# Patient Record
Sex: FEMALE | Race: WHITE | NOT HISPANIC OR LATINO | Employment: OTHER | URBAN - METROPOLITAN AREA
[De-identification: names, ages, dates, MRNs, and addresses within clinical notes are randomized per-mention and may not be internally consistent; named-entity substitution may affect disease eponyms.]

---

## 2017-01-01 ENCOUNTER — ANESTHESIA (OUTPATIENT)
Dept: PERIOP | Facility: HOSPITAL | Age: 57
End: 2017-01-01

## 2017-01-01 ENCOUNTER — OFFICE VISIT (OUTPATIENT)
Dept: ONCOLOGY | Facility: CLINIC | Age: 57
End: 2017-01-01

## 2017-01-01 ENCOUNTER — APPOINTMENT (OUTPATIENT)
Dept: ONCOLOGY | Facility: CLINIC | Age: 57
End: 2017-01-01

## 2017-01-01 ENCOUNTER — TELEPHONE (OUTPATIENT)
Dept: ONCOLOGY | Facility: CLINIC | Age: 57
End: 2017-01-01

## 2017-01-01 ENCOUNTER — DOCUMENTATION (OUTPATIENT)
Dept: RADIATION ONCOLOGY | Facility: HOSPITAL | Age: 57
End: 2017-01-01

## 2017-01-01 ENCOUNTER — TELEPHONE (OUTPATIENT)
Dept: OTHER | Facility: OTHER | Age: 57
End: 2017-01-01

## 2017-01-01 ENCOUNTER — TELEPHONE (OUTPATIENT)
Dept: ONCOLOGY | Facility: HOSPITAL | Age: 57
End: 2017-01-01

## 2017-01-01 ENCOUNTER — APPOINTMENT (OUTPATIENT)
Dept: LAB | Facility: HOSPITAL | Age: 57
End: 2017-01-01

## 2017-01-01 ENCOUNTER — DOCUMENTATION (OUTPATIENT)
Dept: ONCOLOGY | Facility: CLINIC | Age: 57
End: 2017-01-01

## 2017-01-01 ENCOUNTER — LAB (OUTPATIENT)
Dept: ONCOLOGY | Facility: HOSPITAL | Age: 57
End: 2017-01-01

## 2017-01-01 ENCOUNTER — INFUSION (OUTPATIENT)
Dept: ONCOLOGY | Facility: HOSPITAL | Age: 57
End: 2017-01-01

## 2017-01-01 ENCOUNTER — HOSPITAL ENCOUNTER (OUTPATIENT)
Dept: CARDIOLOGY | Facility: HOSPITAL | Age: 57
Discharge: HOME | End: 2017-02-28
Attending: INTERNAL MEDICINE | Admitting: INTERNAL MEDICINE

## 2017-01-01 ENCOUNTER — HOSPITAL ENCOUNTER (OUTPATIENT)
Dept: MRI IMAGING | Facility: HOSPITAL | Age: 57
Discharge: HOME | End: 2017-01-08
Attending: RADIOLOGY | Admitting: RADIOLOGY

## 2017-01-01 ENCOUNTER — TRANSCRIBE ORDERS (OUTPATIENT)
Dept: ONCOLOGY | Facility: CLINIC | Age: 57
End: 2017-01-01

## 2017-01-01 ENCOUNTER — APPOINTMENT (OUTPATIENT)
Dept: ULTRASOUND IMAGING | Facility: HOSPITAL | Age: 57
End: 2017-01-01

## 2017-01-01 ENCOUNTER — RADIATION ONCOLOGY WEEKLY ASSESSMENT (OUTPATIENT)
Dept: RADIATION ONCOLOGY | Facility: HOSPITAL | Age: 57
End: 2017-01-01

## 2017-01-01 ENCOUNTER — APPOINTMENT (OUTPATIENT)
Dept: GENERAL RADIOLOGY | Facility: HOSPITAL | Age: 57
End: 2017-01-01
Attending: INTERNAL MEDICINE

## 2017-01-01 ENCOUNTER — HOSPITAL ENCOUNTER (OUTPATIENT)
Dept: NUCLEAR MEDICINE | Facility: HOSPITAL | Age: 57
Discharge: HOME | End: 2017-04-14
Attending: INTERNAL MEDICINE

## 2017-01-01 ENCOUNTER — APPOINTMENT (OUTPATIENT)
Dept: GENERAL RADIOLOGY | Facility: HOSPITAL | Age: 57
End: 2017-01-01

## 2017-01-01 ENCOUNTER — APPOINTMENT (OUTPATIENT)
Dept: ONCOLOGY | Facility: HOSPITAL | Age: 57
End: 2017-01-01

## 2017-01-01 ENCOUNTER — APPOINTMENT (OUTPATIENT)
Dept: CARDIOLOGY | Facility: HOSPITAL | Age: 57
End: 2017-01-01
Attending: INTERNAL MEDICINE

## 2017-01-01 ENCOUNTER — ANESTHESIA EVENT (OUTPATIENT)
Dept: PERIOP | Facility: HOSPITAL | Age: 57
End: 2017-01-01

## 2017-01-01 ENCOUNTER — HOSPITAL ENCOUNTER (INPATIENT)
Facility: HOSPITAL | Age: 57
LOS: 3 days | Discharge: HOME | End: 2017-08-04
Attending: INTERNAL MEDICINE | Admitting: INTERNAL MEDICINE

## 2017-01-01 ENCOUNTER — APPOINTMENT (OUTPATIENT)
Dept: CT IMAGING | Facility: HOSPITAL | Age: 57
End: 2017-01-01

## 2017-01-01 ENCOUNTER — HOSPITAL ENCOUNTER (OUTPATIENT)
Dept: PET IMAGING | Facility: HOSPITAL | Age: 57
Discharge: HOME | End: 2017-07-05
Attending: INTERNAL MEDICINE | Admitting: INTERNAL MEDICINE

## 2017-01-01 ENCOUNTER — APPOINTMENT (OUTPATIENT)
Dept: ULTRASOUND IMAGING | Facility: HOSPITAL | Age: 57
End: 2017-01-01
Attending: INTERNAL MEDICINE

## 2017-01-01 ENCOUNTER — HOSPITAL ENCOUNTER (OUTPATIENT)
Dept: CARDIOLOGY | Facility: HOSPITAL | Age: 57
Discharge: HOME | End: 2017-06-02
Attending: INTERNAL MEDICINE

## 2017-01-01 ENCOUNTER — PROCEDURE VISIT (OUTPATIENT)
Dept: ONCOLOGY | Facility: HOSPITAL | Age: 57
End: 2017-01-01

## 2017-01-01 ENCOUNTER — OFFICE VISIT (OUTPATIENT)
Dept: RADIATION ONCOLOGY | Facility: HOSPITAL | Age: 57
End: 2017-01-01

## 2017-01-01 ENCOUNTER — HOSPITAL ENCOUNTER (INPATIENT)
Facility: HOSPITAL | Age: 57
LOS: 6 days | Discharge: HOME | End: 2017-06-08
Attending: INTERNAL MEDICINE | Admitting: INTERNAL MEDICINE

## 2017-01-01 ENCOUNTER — APPOINTMENT (OUTPATIENT)
Dept: GENERAL RADIOLOGY | Facility: HOSPITAL | Age: 57
End: 2017-01-01
Attending: NURSE PRACTITIONER

## 2017-01-01 ENCOUNTER — LAB (OUTPATIENT)
Dept: LAB | Facility: HOSPITAL | Age: 57
End: 2017-01-01

## 2017-01-01 ENCOUNTER — APPOINTMENT (OUTPATIENT)
Dept: RADIATION ONCOLOGY | Facility: HOSPITAL | Age: 57
End: 2017-01-01

## 2017-01-01 ENCOUNTER — CLINICAL SUPPORT (OUTPATIENT)
Dept: CARDIOLOGY | Facility: CLINIC | Age: 57
End: 2017-01-01

## 2017-01-01 ENCOUNTER — TELEPHONE (OUTPATIENT)
Dept: SURGERY | Facility: CLINIC | Age: 57
End: 2017-01-01

## 2017-01-01 ENCOUNTER — HOSPITAL ENCOUNTER (OUTPATIENT)
Dept: ULTRASOUND IMAGING | Facility: HOSPITAL | Age: 57
Discharge: HOME | End: 2017-03-07
Attending: INTERNAL MEDICINE | Admitting: INTERNAL MEDICINE

## 2017-01-01 ENCOUNTER — CLINICAL SUPPORT (OUTPATIENT)
Dept: ONCOLOGY | Facility: HOSPITAL | Age: 57
End: 2017-01-01

## 2017-01-01 ENCOUNTER — APPOINTMENT (OUTPATIENT)
Dept: MRI IMAGING | Facility: HOSPITAL | Age: 57
End: 2017-01-01

## 2017-01-01 ENCOUNTER — HOSPITAL ENCOUNTER (OUTPATIENT)
Dept: CT IMAGING | Facility: HOSPITAL | Age: 57
Discharge: HOME | End: 2017-03-23
Attending: INTERNAL MEDICINE | Admitting: INTERNAL MEDICINE

## 2017-01-01 ENCOUNTER — HOSPITAL ENCOUNTER (OUTPATIENT)
Dept: ULTRASOUND IMAGING | Facility: HOSPITAL | Age: 57
Discharge: HOME | End: 2017-03-24
Attending: INTERNAL MEDICINE

## 2017-01-01 ENCOUNTER — APPOINTMENT (OUTPATIENT)
Dept: CT IMAGING | Facility: HOSPITAL | Age: 57
End: 2017-01-01
Attending: INTERNAL MEDICINE

## 2017-01-01 ENCOUNTER — HOSPITAL ENCOUNTER (OUTPATIENT)
Dept: CT IMAGING | Facility: HOSPITAL | Age: 57
Discharge: HOME | End: 2017-03-24
Attending: INTERNAL MEDICINE | Admitting: INTERNAL MEDICINE

## 2017-01-01 ENCOUNTER — HOSPITAL ENCOUNTER (INPATIENT)
Facility: HOSPITAL | Age: 57
LOS: 7 days | Discharge: HOME HEALTH | End: 2017-05-03
Attending: INTERNAL MEDICINE | Admitting: INTERNAL MEDICINE

## 2017-01-01 ENCOUNTER — APPOINTMENT (OUTPATIENT)
Dept: CARDIOLOGY | Facility: HOSPITAL | Age: 57
End: 2017-01-01

## 2017-01-01 ENCOUNTER — OFFICE VISIT (OUTPATIENT)
Dept: SURGERY | Facility: CLINIC | Age: 57
End: 2017-01-01

## 2017-01-01 ENCOUNTER — RESEARCH ENCOUNTER (OUTPATIENT)
Dept: OTHER | Facility: OTHER | Age: 57
End: 2017-01-01

## 2017-01-01 VITALS
HEART RATE: 128 BPM | TEMPERATURE: 97.9 F | OXYGEN SATURATION: 97 % | WEIGHT: 121.6 LBS | HEIGHT: 67 IN | BODY MASS INDEX: 19.09 KG/M2 | SYSTOLIC BLOOD PRESSURE: 110 MMHG | RESPIRATION RATE: 18 BRPM | DIASTOLIC BLOOD PRESSURE: 70 MMHG

## 2017-01-01 VITALS
HEIGHT: 67 IN | HEART RATE: 114 BPM | TEMPERATURE: 98.3 F | RESPIRATION RATE: 16 BRPM | SYSTOLIC BLOOD PRESSURE: 100 MMHG | DIASTOLIC BLOOD PRESSURE: 64 MMHG | WEIGHT: 118.6 LBS | OXYGEN SATURATION: 97 % | BODY MASS INDEX: 18.62 KG/M2

## 2017-01-01 VITALS
SYSTOLIC BLOOD PRESSURE: 97 MMHG | TEMPERATURE: 98.3 F | RESPIRATION RATE: 18 BRPM | HEART RATE: 101 BPM | DIASTOLIC BLOOD PRESSURE: 68 MMHG | OXYGEN SATURATION: 97 %

## 2017-01-01 VITALS
SYSTOLIC BLOOD PRESSURE: 114 MMHG | TEMPERATURE: 98.1 F | HEART RATE: 86 BPM | WEIGHT: 134.8 LBS | RESPIRATION RATE: 16 BRPM | HEIGHT: 67 IN | DIASTOLIC BLOOD PRESSURE: 68 MMHG | OXYGEN SATURATION: 100 % | BODY MASS INDEX: 21.16 KG/M2

## 2017-01-01 VITALS
BODY MASS INDEX: 20.06 KG/M2 | HEIGHT: 67 IN | OXYGEN SATURATION: 96 % | HEART RATE: 120 BPM | RESPIRATION RATE: 18 BRPM | SYSTOLIC BLOOD PRESSURE: 92 MMHG | TEMPERATURE: 98.4 F | DIASTOLIC BLOOD PRESSURE: 64 MMHG | WEIGHT: 127.8 LBS

## 2017-01-01 VITALS
OXYGEN SATURATION: 100 % | RESPIRATION RATE: 16 BRPM | DIASTOLIC BLOOD PRESSURE: 72 MMHG | HEART RATE: 101 BPM | SYSTOLIC BLOOD PRESSURE: 108 MMHG

## 2017-01-01 VITALS
SYSTOLIC BLOOD PRESSURE: 108 MMHG | BODY MASS INDEX: 21.82 KG/M2 | HEART RATE: 97 BPM | DIASTOLIC BLOOD PRESSURE: 70 MMHG | HEIGHT: 67 IN | WEIGHT: 139 LBS | OXYGEN SATURATION: 96 %

## 2017-01-01 VITALS
RESPIRATION RATE: 18 BRPM | DIASTOLIC BLOOD PRESSURE: 62 MMHG | HEART RATE: 107 BPM | OXYGEN SATURATION: 99 % | SYSTOLIC BLOOD PRESSURE: 96 MMHG | WEIGHT: 132.2 LBS | HEIGHT: 67 IN | BODY MASS INDEX: 20.75 KG/M2 | TEMPERATURE: 97.4 F

## 2017-01-01 VITALS
BODY MASS INDEX: 21.77 KG/M2 | HEART RATE: 112 BPM | DIASTOLIC BLOOD PRESSURE: 95 MMHG | TEMPERATURE: 98.6 F | WEIGHT: 139 LBS | SYSTOLIC BLOOD PRESSURE: 141 MMHG

## 2017-01-01 VITALS
BODY MASS INDEX: 19.46 KG/M2 | SYSTOLIC BLOOD PRESSURE: 107 MMHG | HEIGHT: 67 IN | HEART RATE: 102 BPM | OXYGEN SATURATION: 99 % | TEMPERATURE: 98.4 F | RESPIRATION RATE: 16 BRPM | DIASTOLIC BLOOD PRESSURE: 68 MMHG | WEIGHT: 124 LBS

## 2017-01-01 VITALS
BODY MASS INDEX: 20.41 KG/M2 | WEIGHT: 127 LBS | HEIGHT: 66 IN | DIASTOLIC BLOOD PRESSURE: 64 MMHG | SYSTOLIC BLOOD PRESSURE: 92 MMHG

## 2017-01-01 VITALS
BODY MASS INDEX: 20.44 KG/M2 | SYSTOLIC BLOOD PRESSURE: 110 MMHG | DIASTOLIC BLOOD PRESSURE: 64 MMHG | OXYGEN SATURATION: 98 % | HEIGHT: 67 IN | HEART RATE: 119 BPM | TEMPERATURE: 97.8 F | RESPIRATION RATE: 12 BRPM | WEIGHT: 130.2 LBS

## 2017-01-01 VITALS
BODY MASS INDEX: 18.55 KG/M2 | RESPIRATION RATE: 16 BRPM | HEART RATE: 110 BPM | DIASTOLIC BLOOD PRESSURE: 62 MMHG | SYSTOLIC BLOOD PRESSURE: 108 MMHG | HEIGHT: 67 IN | WEIGHT: 118.2 LBS | OXYGEN SATURATION: 98 %

## 2017-01-01 VITALS
SYSTOLIC BLOOD PRESSURE: 124 MMHG | WEIGHT: 139.8 LBS | DIASTOLIC BLOOD PRESSURE: 86 MMHG | BODY MASS INDEX: 21.9 KG/M2 | TEMPERATURE: 97.9 F | HEART RATE: 106 BPM

## 2017-01-01 VITALS
BODY MASS INDEX: 21.82 KG/M2 | OXYGEN SATURATION: 100 % | DIASTOLIC BLOOD PRESSURE: 80 MMHG | WEIGHT: 139 LBS | HEIGHT: 67 IN | RESPIRATION RATE: 16 BRPM | SYSTOLIC BLOOD PRESSURE: 126 MMHG | TEMPERATURE: 97 F | HEART RATE: 74 BPM

## 2017-01-01 VITALS
DIASTOLIC BLOOD PRESSURE: 67 MMHG | HEART RATE: 112 BPM | RESPIRATION RATE: 16 BRPM | OXYGEN SATURATION: 97 % | WEIGHT: 132 LBS | HEIGHT: 67 IN | BODY MASS INDEX: 20.72 KG/M2 | TEMPERATURE: 97.8 F | SYSTOLIC BLOOD PRESSURE: 104 MMHG

## 2017-01-01 VITALS
HEIGHT: 67 IN | WEIGHT: 134.2 LBS | RESPIRATION RATE: 16 BRPM | HEART RATE: 96 BPM | TEMPERATURE: 98 F | BODY MASS INDEX: 21.06 KG/M2 | SYSTOLIC BLOOD PRESSURE: 120 MMHG | DIASTOLIC BLOOD PRESSURE: 70 MMHG

## 2017-01-01 VITALS
HEIGHT: 67 IN | DIASTOLIC BLOOD PRESSURE: 62 MMHG | TEMPERATURE: 97.9 F | SYSTOLIC BLOOD PRESSURE: 90 MMHG | WEIGHT: 123.6 LBS | BODY MASS INDEX: 19.4 KG/M2 | RESPIRATION RATE: 16 BRPM | HEART RATE: 102 BPM

## 2017-01-01 VITALS
WEIGHT: 119.2 LBS | BODY MASS INDEX: 18.71 KG/M2 | SYSTOLIC BLOOD PRESSURE: 110 MMHG | RESPIRATION RATE: 16 BRPM | TEMPERATURE: 98.5 F | OXYGEN SATURATION: 98 % | HEART RATE: 122 BPM | DIASTOLIC BLOOD PRESSURE: 68 MMHG | HEIGHT: 67 IN

## 2017-01-01 VITALS
HEART RATE: 116 BPM | DIASTOLIC BLOOD PRESSURE: 58 MMHG | OXYGEN SATURATION: 100 % | TEMPERATURE: 97.5 F | WEIGHT: 121 LBS | HEIGHT: 67 IN | BODY MASS INDEX: 18.99 KG/M2 | SYSTOLIC BLOOD PRESSURE: 94 MMHG | RESPIRATION RATE: 18 BRPM

## 2017-01-01 VITALS
DIASTOLIC BLOOD PRESSURE: 60 MMHG | DIASTOLIC BLOOD PRESSURE: 61 MMHG | OXYGEN SATURATION: 99 % | HEART RATE: 110 BPM | RESPIRATION RATE: 16 BRPM | SYSTOLIC BLOOD PRESSURE: 102 MMHG | TEMPERATURE: 99.4 F | TEMPERATURE: 98.3 F | HEART RATE: 121 BPM | WEIGHT: 121.6 LBS | BODY MASS INDEX: 19.49 KG/M2 | SYSTOLIC BLOOD PRESSURE: 103 MMHG | HEIGHT: 67 IN | WEIGHT: 124.2 LBS | BODY MASS INDEX: 19.2 KG/M2

## 2017-01-01 VITALS
BODY MASS INDEX: 18.82 KG/M2 | DIASTOLIC BLOOD PRESSURE: 63 MMHG | HEART RATE: 109 BPM | OXYGEN SATURATION: 99 % | SYSTOLIC BLOOD PRESSURE: 95 MMHG | WEIGHT: 119.2 LBS

## 2017-01-01 VITALS
SYSTOLIC BLOOD PRESSURE: 151 MMHG | RESPIRATION RATE: 16 BRPM | HEART RATE: 106 BPM | HEIGHT: 67 IN | TEMPERATURE: 98.5 F | OXYGEN SATURATION: 92 % | DIASTOLIC BLOOD PRESSURE: 98 MMHG

## 2017-01-01 VITALS
HEART RATE: 99 BPM | BODY MASS INDEX: 21.82 KG/M2 | OXYGEN SATURATION: 99 % | RESPIRATION RATE: 16 BRPM | SYSTOLIC BLOOD PRESSURE: 130 MMHG | TEMPERATURE: 98 F | DIASTOLIC BLOOD PRESSURE: 76 MMHG | HEIGHT: 67 IN | WEIGHT: 139 LBS

## 2017-01-01 VITALS
DIASTOLIC BLOOD PRESSURE: 55 MMHG | SYSTOLIC BLOOD PRESSURE: 90 MMHG | HEART RATE: 128 BPM | HEIGHT: 67 IN | TEMPERATURE: 96.9 F | OXYGEN SATURATION: 98 % | WEIGHT: 125.2 LBS | RESPIRATION RATE: 16 BRPM | BODY MASS INDEX: 19.65 KG/M2

## 2017-01-01 VITALS
BODY MASS INDEX: 20.72 KG/M2 | RESPIRATION RATE: 16 BRPM | TEMPERATURE: 98.6 F | DIASTOLIC BLOOD PRESSURE: 76 MMHG | HEIGHT: 67 IN | HEART RATE: 108 BPM | OXYGEN SATURATION: 99 % | SYSTOLIC BLOOD PRESSURE: 110 MMHG | WEIGHT: 132 LBS

## 2017-01-01 VITALS — DIASTOLIC BLOOD PRESSURE: 72 MMHG | HEART RATE: 100 BPM | SYSTOLIC BLOOD PRESSURE: 110 MMHG

## 2017-01-01 DIAGNOSIS — J90 BILATERAL PLEURAL EFFUSION: ICD-10-CM

## 2017-01-01 DIAGNOSIS — C50.919 BREAST CANCER METASTASIZED TO BRAIN, UNSPECIFIED LATERALITY (HCC): Primary | ICD-10-CM

## 2017-01-01 DIAGNOSIS — C79.31 BREAST CANCER METASTASIZED TO BRAIN, UNSPECIFIED LATERALITY (HCC): ICD-10-CM

## 2017-01-01 DIAGNOSIS — C50.919 BREAST CANCER METASTASIZED TO BRAIN, UNSPECIFIED LATERALITY (HCC): ICD-10-CM

## 2017-01-01 DIAGNOSIS — C78.02 MALIGNANT NEOPLASM METASTATIC TO LEFT LUNG (HCC): ICD-10-CM

## 2017-01-01 DIAGNOSIS — C78.7 BREAST CANCER METASTASIZED TO LIVER, UNSPECIFIED LATERALITY (HCC): ICD-10-CM

## 2017-01-01 DIAGNOSIS — C78.7 BREAST CANCER METASTASIZED TO LIVER, LEFT (HCC): Primary | ICD-10-CM

## 2017-01-01 DIAGNOSIS — C50.919 BREAST CANCER METASTASIZED TO LIVER, UNSPECIFIED LATERALITY (HCC): ICD-10-CM

## 2017-01-01 DIAGNOSIS — C50.919 MALIGNANT NEOPLASM OF OTHER SPECIFIED SITES OF FEMALE BREAST: Primary | ICD-10-CM

## 2017-01-01 DIAGNOSIS — Z45.2 FITTING AND ADJUSTMENT OF VASCULAR CATHETER: ICD-10-CM

## 2017-01-01 DIAGNOSIS — C50.919 MALIGNANT NEOPLASM OF OTHER SPECIFIED SITES OF FEMALE BREAST: ICD-10-CM

## 2017-01-01 DIAGNOSIS — Z79.899 ENCOUNTER FOR LONG-TERM (CURRENT) USE OF HIGH-RISK MEDICATION: ICD-10-CM

## 2017-01-01 DIAGNOSIS — D70.1 CHEMOTHERAPY INDUCED NEUTROPENIA (HCC): ICD-10-CM

## 2017-01-01 DIAGNOSIS — T45.1X5A ANEMIA ASSOCIATED WITH CHEMOTHERAPY: Primary | ICD-10-CM

## 2017-01-01 DIAGNOSIS — J91.0 MALIGNANT PLEURAL EFFUSION: Primary | ICD-10-CM

## 2017-01-01 DIAGNOSIS — G89.3 CANCER-RELATED PAIN: ICD-10-CM

## 2017-01-01 DIAGNOSIS — C50.919 MALIGNANT NEOPLASM OF FEMALE BREAST, UNSPECIFIED LATERALITY, UNSPECIFIED SITE OF BREAST: Primary | ICD-10-CM

## 2017-01-01 DIAGNOSIS — Z45.2 FITTING AND ADJUSTMENT OF VASCULAR CATHETER: Primary | ICD-10-CM

## 2017-01-01 DIAGNOSIS — Z79.899 ENCOUNTER FOR LONG-TERM (CURRENT) USE OF HIGH-RISK MEDICATION: Primary | ICD-10-CM

## 2017-01-01 DIAGNOSIS — C50.919 BREAST CANCER METASTASIZED TO LIVER, UNSPECIFIED LATERALITY (HCC): Primary | ICD-10-CM

## 2017-01-01 DIAGNOSIS — R52 INTRACTABLE PAIN: ICD-10-CM

## 2017-01-01 DIAGNOSIS — C50.912 BREAST CANCER METASTASIZED TO BRAIN, LEFT (HCC): ICD-10-CM

## 2017-01-01 DIAGNOSIS — C50.912 BREAST CANCER METASTASIZED TO LIVER, LEFT (HCC): Primary | ICD-10-CM

## 2017-01-01 DIAGNOSIS — C78.7 BREAST CANCER METASTASIZED TO LIVER, UNSPECIFIED LATERALITY (HCC): Primary | ICD-10-CM

## 2017-01-01 DIAGNOSIS — D64.81 ANEMIA ASSOCIATED WITH CHEMOTHERAPY: ICD-10-CM

## 2017-01-01 DIAGNOSIS — T45.1X5A CHEMOTHERAPY INDUCED NEUTROPENIA (HCC): ICD-10-CM

## 2017-01-01 DIAGNOSIS — C79.31 BREAST CANCER METASTASIZED TO BRAIN, LEFT (HCC): Primary | ICD-10-CM

## 2017-01-01 DIAGNOSIS — R06.00 DYSPNEA, UNSPECIFIED: ICD-10-CM

## 2017-01-01 DIAGNOSIS — I31.39 PERICARDIAL EFFUSION WITHOUT CARDIAC TAMPONADE: ICD-10-CM

## 2017-01-01 DIAGNOSIS — Z51.11 ENCOUNTER FOR ANTINEOPLASTIC CHEMOTHERAPY: Primary | ICD-10-CM

## 2017-01-01 DIAGNOSIS — T45.1X5A ANEMIA ASSOCIATED WITH CHEMOTHERAPY: ICD-10-CM

## 2017-01-01 DIAGNOSIS — C78.02 MALIGNANT NEOPLASM METASTATIC TO LEFT LUNG (HCC): Primary | ICD-10-CM

## 2017-01-01 DIAGNOSIS — E87.1 HYPONATREMIA SYNDROME: ICD-10-CM

## 2017-01-01 DIAGNOSIS — C79.31 BREAST CANCER METASTASIZED TO BRAIN, UNSPECIFIED LATERALITY (HCC): Primary | ICD-10-CM

## 2017-01-01 DIAGNOSIS — C79.31 BRAIN METASTASIS: ICD-10-CM

## 2017-01-01 DIAGNOSIS — J90 BILATERAL PLEURAL EFFUSION: Primary | ICD-10-CM

## 2017-01-01 DIAGNOSIS — C50.912 BREAST CANCER METASTASIZED TO BRAIN, LEFT (HCC): Primary | ICD-10-CM

## 2017-01-01 DIAGNOSIS — R07.81 PLEURITIC CHEST PAIN: ICD-10-CM

## 2017-01-01 DIAGNOSIS — J91.0 MALIGNANT PLEURAL EFFUSION: ICD-10-CM

## 2017-01-01 DIAGNOSIS — C50.912 BREAST CANCER METASTASIZED TO LIVER, LEFT (HCC): ICD-10-CM

## 2017-01-01 DIAGNOSIS — M25.862 KNEE JOINT CYST, LEFT: ICD-10-CM

## 2017-01-01 DIAGNOSIS — K59.00 CONSTIPATION, UNSPECIFIED CONSTIPATION TYPE: ICD-10-CM

## 2017-01-01 DIAGNOSIS — R53.1 WEAKNESS: Primary | ICD-10-CM

## 2017-01-01 DIAGNOSIS — M25.862 KNEE JOINT CYST, LEFT: Primary | ICD-10-CM

## 2017-01-01 DIAGNOSIS — D64.81 ANEMIA ASSOCIATED WITH CHEMOTHERAPY: Primary | ICD-10-CM

## 2017-01-01 DIAGNOSIS — K11.7 INCREASED OROPHARYNGEAL SECRETIONS: Primary | ICD-10-CM

## 2017-01-01 DIAGNOSIS — I31.39 PERICARDIAL EFFUSION WITHOUT CARDIAC TAMPONADE: Primary | ICD-10-CM

## 2017-01-01 DIAGNOSIS — C79.31 BRAIN METASTASIS: Primary | ICD-10-CM

## 2017-01-01 DIAGNOSIS — C79.31 BREAST CANCER METASTASIZED TO BRAIN, LEFT (HCC): ICD-10-CM

## 2017-01-01 DIAGNOSIS — C78.7 BREAST CANCER METASTASIZED TO LIVER, LEFT (HCC): ICD-10-CM

## 2017-01-01 DIAGNOSIS — D72.819 LEUKOPENIA, UNSPECIFIED TYPE: ICD-10-CM

## 2017-01-01 DIAGNOSIS — R53.1 WEAKNESS: ICD-10-CM

## 2017-01-01 LAB
ABO + RH BLD: NORMAL
ABO + RH BLD: NORMAL
ABO GROUP BLD: NORMAL
ALBUMIN SERPL-MCNC: 2.4 G/DL (ref 3.5–5.2)
ALBUMIN SERPL-MCNC: 2.5 G/DL (ref 3.5–5.2)
ALBUMIN SERPL-MCNC: 2.6 G/DL (ref 3.5–5.2)
ALBUMIN SERPL-MCNC: 2.7 G/DL (ref 3.5–5.2)
ALBUMIN SERPL-MCNC: 2.8 G/DL (ref 3.5–5.2)
ALBUMIN SERPL-MCNC: 2.9 G/DL (ref 3.5–5.2)
ALBUMIN SERPL-MCNC: 3.1 G/DL (ref 3.5–5.2)
ALBUMIN SERPL-MCNC: 3.3 G/DL (ref 3.5–5.2)
ALBUMIN SERPL-MCNC: 3.3 G/DL (ref 3.5–5.2)
ALBUMIN SERPL-MCNC: 3.4 G/DL (ref 3.5–5.2)
ALBUMIN SERPL-MCNC: 3.4 G/DL (ref 3.5–5.2)
ALBUMIN SERPL-MCNC: 3.6 G/DL (ref 3.5–5.2)
ALBUMIN SERPL-MCNC: 3.7 G/DL (ref 3.5–5.2)
ALBUMIN SERPL-MCNC: 4.1 G/DL (ref 3.5–5.2)
ALBUMIN SERPL-MCNC: 4.1 G/DL (ref 3.5–5.2)
ALBUMIN/GLOB SERPL: 0.9 G/DL
ALBUMIN/GLOB SERPL: 0.9 G/DL
ALBUMIN/GLOB SERPL: 0.9 G/DL (ref 1.1–2.4)
ALBUMIN/GLOB SERPL: 1 G/DL
ALBUMIN/GLOB SERPL: 1 G/DL (ref 1.1–2.4)
ALBUMIN/GLOB SERPL: 1.2 G/DL (ref 1.1–2.4)
ALBUMIN/GLOB SERPL: 1.3 G/DL (ref 1.1–2.4)
ALBUMIN/GLOB SERPL: 1.3 G/DL (ref 1.1–2.4)
ALBUMIN/GLOB SERPL: 1.5 G/DL (ref 1.1–2.4)
ALBUMIN/GLOB SERPL: 1.6 G/DL (ref 1.1–2.4)
ALP SERPL-CCNC: 127 U/L (ref 38–116)
ALP SERPL-CCNC: 135 U/L (ref 38–116)
ALP SERPL-CCNC: 137 U/L (ref 39–117)
ALP SERPL-CCNC: 146 U/L (ref 39–117)
ALP SERPL-CCNC: 152 U/L (ref 38–116)
ALP SERPL-CCNC: 152 U/L (ref 39–117)
ALP SERPL-CCNC: 152 U/L (ref 39–117)
ALP SERPL-CCNC: 154 U/L (ref 39–117)
ALP SERPL-CCNC: 157 U/L (ref 39–117)
ALP SERPL-CCNC: 163 U/L (ref 38–116)
ALP SERPL-CCNC: 165 U/L (ref 38–116)
ALP SERPL-CCNC: 166 U/L (ref 38–116)
ALP SERPL-CCNC: 171 U/L (ref 38–116)
ALP SERPL-CCNC: 174 U/L (ref 39–117)
ALP SERPL-CCNC: 182 U/L (ref 38–116)
ALP SERPL-CCNC: 184 U/L (ref 38–116)
ALP SERPL-CCNC: 186 U/L (ref 38–116)
ALP SERPL-CCNC: 202 U/L (ref 38–116)
ALP SERPL-CCNC: 210 U/L (ref 38–116)
ALP SERPL-CCNC: 224 U/L (ref 38–116)
ALT SERPL W P-5'-P-CCNC: 22 U/L (ref 0–33)
ALT SERPL W P-5'-P-CCNC: 27 U/L (ref 0–33)
ALT SERPL W P-5'-P-CCNC: 28 U/L (ref 1–33)
ALT SERPL W P-5'-P-CCNC: 29 U/L (ref 1–33)
ALT SERPL W P-5'-P-CCNC: 30 U/L (ref 0–33)
ALT SERPL W P-5'-P-CCNC: 30 U/L (ref 0–33)
ALT SERPL W P-5'-P-CCNC: 31 U/L (ref 1–33)
ALT SERPL W P-5'-P-CCNC: 33 U/L (ref 0–33)
ALT SERPL W P-5'-P-CCNC: 35 U/L (ref 0–33)
ALT SERPL W P-5'-P-CCNC: 36 U/L (ref 1–33)
ALT SERPL W P-5'-P-CCNC: 37 U/L (ref 1–33)
ALT SERPL W P-5'-P-CCNC: 38 U/L (ref 1–33)
ALT SERPL W P-5'-P-CCNC: 43 U/L (ref 0–33)
ALT SERPL W P-5'-P-CCNC: 44 U/L (ref 0–33)
ALT SERPL W P-5'-P-CCNC: 45 U/L (ref 0–33)
ALT SERPL W P-5'-P-CCNC: 46 U/L (ref 0–33)
ALT SERPL W P-5'-P-CCNC: 49 U/L (ref 1–33)
ALT SERPL W P-5'-P-CCNC: 54 U/L (ref 0–33)
ALT SERPL W P-5'-P-CCNC: 55 U/L (ref 0–33)
ALT SERPL W P-5'-P-CCNC: 58 U/L (ref 0–33)
ANION GAP SERPL CALCULATED.3IONS-SCNC: 10.4 MMOL/L
ANION GAP SERPL CALCULATED.3IONS-SCNC: 10.5 MMOL/L
ANION GAP SERPL CALCULATED.3IONS-SCNC: 10.7 MMOL/L
ANION GAP SERPL CALCULATED.3IONS-SCNC: 10.7 MMOL/L
ANION GAP SERPL CALCULATED.3IONS-SCNC: 10.8 MMOL/L
ANION GAP SERPL CALCULATED.3IONS-SCNC: 11 MMOL/L
ANION GAP SERPL CALCULATED.3IONS-SCNC: 11.1 MMOL/L
ANION GAP SERPL CALCULATED.3IONS-SCNC: 11.2 MMOL/L
ANION GAP SERPL CALCULATED.3IONS-SCNC: 11.3 MMOL/L
ANION GAP SERPL CALCULATED.3IONS-SCNC: 11.7 MMOL/L
ANION GAP SERPL CALCULATED.3IONS-SCNC: 12 MMOL/L
ANION GAP SERPL CALCULATED.3IONS-SCNC: 12.3 MMOL/L
ANION GAP SERPL CALCULATED.3IONS-SCNC: 12.8 MMOL/L
ANION GAP SERPL CALCULATED.3IONS-SCNC: 13.2 MMOL/L
ANION GAP SERPL CALCULATED.3IONS-SCNC: 13.2 MMOL/L
ANION GAP SERPL CALCULATED.3IONS-SCNC: 13.3 MMOL/L
ANION GAP SERPL CALCULATED.3IONS-SCNC: 13.6 MMOL/L
ANION GAP SERPL CALCULATED.3IONS-SCNC: 13.7 MMOL/L
ANION GAP SERPL CALCULATED.3IONS-SCNC: 13.9 MMOL/L
ANION GAP SERPL CALCULATED.3IONS-SCNC: 14.4 MMOL/L
ANION GAP SERPL CALCULATED.3IONS-SCNC: 15.5 MMOL/L
ANION GAP SERPL CALCULATED.3IONS-SCNC: 17.4 MMOL/L
ANION GAP SERPL CALCULATED.3IONS-SCNC: 19.5 MMOL/L
ANION GAP SERPL CALCULATED.3IONS-SCNC: 6.6 MMOL/L
ANION GAP SERPL CALCULATED.3IONS-SCNC: 7.1 MMOL/L
ANION GAP SERPL CALCULATED.3IONS-SCNC: 8.3 MMOL/L
ANION GAP SERPL CALCULATED.3IONS-SCNC: 8.7 MMOL/L
ANION GAP SERPL CALCULATED.3IONS-SCNC: 9.2 MMOL/L
ANION GAP SERPL CALCULATED.3IONS-SCNC: 9.5 MMOL/L
ANION GAP SERPL CALCULATED.3IONS-SCNC: 9.7 MMOL/L
ANISOCYTOSIS BLD QL: ABNORMAL
ANISOCYTOSIS BLD QL: ABNORMAL
APTT PPP: 31.7 SECONDS (ref 22.7–35.4)
AST SERPL-CCNC: 31 U/L (ref 1–32)
AST SERPL-CCNC: 33 U/L (ref 0–32)
AST SERPL-CCNC: 33 U/L (ref 1–32)
AST SERPL-CCNC: 34 U/L (ref 0–32)
AST SERPL-CCNC: 35 U/L (ref 0–32)
AST SERPL-CCNC: 36 U/L (ref 1–32)
AST SERPL-CCNC: 38 U/L (ref 0–32)
AST SERPL-CCNC: 38 U/L (ref 0–32)
AST SERPL-CCNC: 39 U/L (ref 0–32)
AST SERPL-CCNC: 39 U/L (ref 0–32)
AST SERPL-CCNC: 40 U/L (ref 0–32)
AST SERPL-CCNC: 41 U/L (ref 0–32)
AST SERPL-CCNC: 43 U/L (ref 0–32)
AST SERPL-CCNC: 45 U/L (ref 1–32)
AST SERPL-CCNC: 45 U/L (ref 1–32)
AST SERPL-CCNC: 49 U/L (ref 0–32)
AST SERPL-CCNC: 49 U/L (ref 0–32)
AST SERPL-CCNC: 58 U/L (ref 0–32)
AST SERPL-CCNC: 59 U/L (ref 1–32)
AST SERPL-CCNC: 61 U/L (ref 1–32)
BASOPHILS # BLD AUTO: 0 10*3/MM3 (ref 0–0.1)
BASOPHILS # BLD AUTO: 0 10*3/MM3 (ref 0–0.2)
BASOPHILS # BLD AUTO: 0.01 10*3/MM3 (ref 0–0.1)
BASOPHILS # BLD AUTO: 0.01 10*3/MM3 (ref 0–0.1)
BASOPHILS # BLD AUTO: 0.01 10*3/MM3 (ref 0–0.2)
BASOPHILS # BLD AUTO: 0.02 10*3/MM3 (ref 0–0.1)
BASOPHILS # BLD AUTO: 0.02 10*3/MM3 (ref 0–0.2)
BASOPHILS # BLD AUTO: 0.03 10*3/MM3 (ref 0–0.1)
BASOPHILS NFR BLD AUTO: 0 % (ref 0–1.1)
BASOPHILS NFR BLD AUTO: 0 % (ref 0–1.5)
BASOPHILS NFR BLD AUTO: 0.1 % (ref 0–1.1)
BASOPHILS NFR BLD AUTO: 0.1 % (ref 0–1.5)
BASOPHILS NFR BLD AUTO: 0.2 % (ref 0–1.1)
BASOPHILS NFR BLD AUTO: 0.2 % (ref 0–1.5)
BASOPHILS NFR BLD AUTO: 0.3 % (ref 0–1.1)
BASOPHILS NFR BLD AUTO: 0.3 % (ref 0–1.5)
BASOPHILS NFR BLD AUTO: 0.4 % (ref 0–1.1)
BASOPHILS NFR BLD AUTO: 0.6 % (ref 0–1.1)
BASOPHILS NFR BLD AUTO: 0.7 % (ref 0–1.1)
BASOPHILS NFR BLD AUTO: 0.7 % (ref 0–1.1)
BASOPHILS NFR BLD AUTO: 0.8 % (ref 0–1.1)
BH BB BLOOD EXPIRATION DATE: NORMAL
BH BB BLOOD EXPIRATION DATE: NORMAL
BH BB BLOOD TYPE BARCODE: 5100
BH BB BLOOD TYPE BARCODE: 5100
BH BB DISPENSE STATUS: NORMAL
BH BB DISPENSE STATUS: NORMAL
BH BB PRODUCT CODE: NORMAL
BH BB PRODUCT CODE: NORMAL
BH BB UNIT NUMBER: NORMAL
BH BB UNIT NUMBER: NORMAL
BH CV ECHO MEAS - ACS: 1.8 CM
BH CV ECHO MEAS - ACS: 1.8 CM
BH CV ECHO MEAS - AO MAX PG (FULL): 3.3 MMHG
BH CV ECHO MEAS - AO MAX PG (FULL): 4.7 MMHG
BH CV ECHO MEAS - AO MAX PG: 7 MMHG
BH CV ECHO MEAS - AO MAX PG: 9.9 MMHG
BH CV ECHO MEAS - AO MEAN PG (FULL): 2 MMHG
BH CV ECHO MEAS - AO MEAN PG (FULL): 2.6 MMHG
BH CV ECHO MEAS - AO MEAN PG: 4 MMHG
BH CV ECHO MEAS - AO MEAN PG: 5.3 MMHG
BH CV ECHO MEAS - AO ROOT AREA (BSA CORRECTED): 1.5
BH CV ECHO MEAS - AO ROOT AREA (BSA CORRECTED): 1.9
BH CV ECHO MEAS - AO ROOT AREA: 5.6 CM^2
BH CV ECHO MEAS - AO ROOT AREA: 7.5 CM^2
BH CV ECHO MEAS - AO ROOT DIAM: 2.7 CM
BH CV ECHO MEAS - AO ROOT DIAM: 3.1 CM
BH CV ECHO MEAS - AO V2 MAX: 132 CM/SEC
BH CV ECHO MEAS - AO V2 MAX: 157.5 CM/SEC
BH CV ECHO MEAS - AO V2 MEAN: 105.1 CM/SEC
BH CV ECHO MEAS - AO V2 MEAN: 97.5 CM/SEC
BH CV ECHO MEAS - AO V2 VTI: 21.4 CM
BH CV ECHO MEAS - AO V2 VTI: 27.7 CM
BH CV ECHO MEAS - AVA(I,A): 1.9 CM^2
BH CV ECHO MEAS - AVA(I,A): 2.8 CM^2
BH CV ECHO MEAS - AVA(I,D): 1.9 CM^2
BH CV ECHO MEAS - AVA(I,D): 2.8 CM^2
BH CV ECHO MEAS - AVA(V,A): 2.1 CM^2
BH CV ECHO MEAS - AVA(V,A): 2.5 CM^2
BH CV ECHO MEAS - AVA(V,D): 2.1 CM^2
BH CV ECHO MEAS - AVA(V,D): 2.5 CM^2
BH CV ECHO MEAS - BSA(HAYCOCK): 1.6 M^2
BH CV ECHO MEAS - BSA(HAYCOCK): 1.7 M^2
BH CV ECHO MEAS - BSA: 1.6 M^2
BH CV ECHO MEAS - BSA: 1.7 M^2
BH CV ECHO MEAS - BZI_BMI: 19.4 KILOGRAMS/M^2
BH CV ECHO MEAS - BZI_BMI: 20.5 KILOGRAMS/M^2
BH CV ECHO MEAS - BZI_BMI: 21.8 KILOGRAMS/M^2
BH CV ECHO MEAS - BZI_METRIC_HEIGHT: 167.6 CM
BH CV ECHO MEAS - BZI_METRIC_HEIGHT: 170.2 CM
BH CV ECHO MEAS - BZI_METRIC_WEIGHT: 56.2 KG
BH CV ECHO MEAS - BZI_METRIC_WEIGHT: 57.6 KG
BH CV ECHO MEAS - BZI_METRIC_WEIGHT: 63.1 KG
BH CV ECHO MEAS - CONTRAST EF (2CH): 53.3 ML/M^2
BH CV ECHO MEAS - CONTRAST EF (2CH): 64.9 ML/M^2
BH CV ECHO MEAS - CONTRAST EF 4CH: 62.4 ML/M^2
BH CV ECHO MEAS - CONTRAST EF 4CH: 69.6 ML/M^2
BH CV ECHO MEAS - EDV(CUBED): 71.4 ML
BH CV ECHO MEAS - EDV(CUBED): 85.2 ML
BH CV ECHO MEAS - EDV(MOD-SP2): 45 ML
BH CV ECHO MEAS - EDV(MOD-SP2): 74 ML
BH CV ECHO MEAS - EDV(MOD-SP4): 46 ML
BH CV ECHO MEAS - EDV(MOD-SP4): 85 ML
BH CV ECHO MEAS - EDV(TEICH): 76.3 ML
BH CV ECHO MEAS - EDV(TEICH): 87.7 ML
BH CV ECHO MEAS - EF(CUBED): 64 %
BH CV ECHO MEAS - EF(CUBED): 81.7 %
BH CV ECHO MEAS - EF(MOD-SP2): 53.3 %
BH CV ECHO MEAS - EF(MOD-SP2): 64.9 %
BH CV ECHO MEAS - EF(MOD-SP4): 64 %
BH CV ECHO MEAS - EF(MOD-SP4): 69.6 %
BH CV ECHO MEAS - EF(TEICH): 56 %
BH CV ECHO MEAS - EF(TEICH): 74.5 %
BH CV ECHO MEAS - ESV(CUBED): 15.6 ML
BH CV ECHO MEAS - ESV(CUBED): 25.7 ML
BH CV ECHO MEAS - ESV(MOD-SP2): 21 ML
BH CV ECHO MEAS - ESV(MOD-SP2): 26 ML
BH CV ECHO MEAS - ESV(MOD-SP4): 14 ML
BH CV ECHO MEAS - ESV(MOD-SP4): 32 ML
BH CV ECHO MEAS - ESV(TEICH): 22.3 ML
BH CV ECHO MEAS - ESV(TEICH): 33.6 ML
BH CV ECHO MEAS - FS: 28.9 %
BH CV ECHO MEAS - FS: 43.2 %
BH CV ECHO MEAS - IVS/LVPW: 0.89
BH CV ECHO MEAS - IVS/LVPW: 1
BH CV ECHO MEAS - IVSD: 0.8 CM
BH CV ECHO MEAS - IVSD: 0.8 CM
BH CV ECHO MEAS - LA DIMENSION: 3.4 CM
BH CV ECHO MEAS - LA/AO: 1.1
BH CV ECHO MEAS - LAT PEAK E' VEL: 8 CM/SEC
BH CV ECHO MEAS - LAT PEAK E' VEL: 8.5 CM/SEC
BH CV ECHO MEAS - LV DIASTOLIC VOL/BSA (35-75): 27.9 ML/M^2
BH CV ECHO MEAS - LV DIASTOLIC VOL/BSA (35-75): 49.1 ML/M^2
BH CV ECHO MEAS - LV MASS(C)D: 118.6 GRAMS
BH CV ECHO MEAS - LV MASS(C)D: 96.5 GRAMS
BH CV ECHO MEAS - LV MASS(C)DI: 55.7 GRAMS/M^2
BH CV ECHO MEAS - LV MASS(C)DI: 71.9 GRAMS/M^2
BH CV ECHO MEAS - LV MAX PG: 3.6 MMHG
BH CV ECHO MEAS - LV MAX PG: 5.2 MMHG
BH CV ECHO MEAS - LV MEAN PG: 2 MMHG
BH CV ECHO MEAS - LV MEAN PG: 2.7 MMHG
BH CV ECHO MEAS - LV SYSTOLIC VOL/BSA (12-30): 18.5 ML/M^2
BH CV ECHO MEAS - LV SYSTOLIC VOL/BSA (12-30): 8.5 ML/M^2
BH CV ECHO MEAS - LV V1 MAX: 114.5 CM/SEC
BH CV ECHO MEAS - LV V1 MAX: 95.3 CM/SEC
BH CV ECHO MEAS - LV V1 MEAN: 64.3 CM/SEC
BH CV ECHO MEAS - LV V1 MEAN: 75.9 CM/SEC
BH CV ECHO MEAS - LV V1 VTI: 17.2 CM
BH CV ECHO MEAS - LV V1 VTI: 18.7 CM
BH CV ECHO MEAS - LVIDD: 4.1 CM
BH CV ECHO MEAS - LVIDD: 4.4 CM
BH CV ECHO MEAS - LVIDS: 2.5 CM
BH CV ECHO MEAS - LVIDS: 2.9 CM
BH CV ECHO MEAS - LVLD AP2: 6 CM
BH CV ECHO MEAS - LVLD AP2: 6.5 CM
BH CV ECHO MEAS - LVLD AP4: 6.3 CM
BH CV ECHO MEAS - LVLD AP4: 6.6 CM
BH CV ECHO MEAS - LVLS AP2: 5.4 CM
BH CV ECHO MEAS - LVLS AP2: 5.6 CM
BH CV ECHO MEAS - LVLS AP4: 5.3 CM
BH CV ECHO MEAS - LVLS AP4: 5.4 CM
BH CV ECHO MEAS - LVOT AREA (M): 2.8 CM^2
BH CV ECHO MEAS - LVOT AREA (M): 3.5 CM^2
BH CV ECHO MEAS - LVOT AREA: 2.9 CM^2
BH CV ECHO MEAS - LVOT AREA: 3.5 CM^2
BH CV ECHO MEAS - LVOT DIAM: 1.9 CM
BH CV ECHO MEAS - LVOT DIAM: 2.1 CM
BH CV ECHO MEAS - LVPWD: 0.77 CM
BH CV ECHO MEAS - LVPWD: 0.9 CM
BH CV ECHO MEAS - MED PEAK E' VEL: 10.2 CM/SEC
BH CV ECHO MEAS - MED PEAK E' VEL: 8 CM/SEC
BH CV ECHO MEAS - MV A DUR: 0.11 SEC
BH CV ECHO MEAS - MV A DUR: 0.11 SEC
BH CV ECHO MEAS - MV A MAX VEL: 66.9 CM/SEC
BH CV ECHO MEAS - MV A MAX VEL: 71.8 CM/SEC
BH CV ECHO MEAS - MV DEC SLOPE: 416 CM/SEC^2
BH CV ECHO MEAS - MV DEC TIME: 0.08 SEC
BH CV ECHO MEAS - MV DEC TIME: 0.18 SEC
BH CV ECHO MEAS - MV E MAX VEL: 70.8 CM/SEC
BH CV ECHO MEAS - MV E MAX VEL: 95.6 CM/SEC
BH CV ECHO MEAS - MV E/A: 1.1
BH CV ECHO MEAS - MV E/A: 1.3
BH CV ECHO MEAS - MV MAX PG: 2.7 MMHG
BH CV ECHO MEAS - MV MEAN PG: 1.6 MMHG
BH CV ECHO MEAS - MV P1/2T MAX VEL: 72.3 CM/SEC
BH CV ECHO MEAS - MV P1/2T: 50.9 MSEC
BH CV ECHO MEAS - MV V2 MAX: 81.7 CM/SEC
BH CV ECHO MEAS - MV V2 MEAN: 58.8 CM/SEC
BH CV ECHO MEAS - MV V2 VTI: 13.2 CM
BH CV ECHO MEAS - MVA P1/2T LCG: 3 CM^2
BH CV ECHO MEAS - MVA(P1/2T): 4.3 CM^2
BH CV ECHO MEAS - MVA(VTI): 4.1 CM^2
BH CV ECHO MEAS - PA MAX PG (FULL): 0.9 MMHG
BH CV ECHO MEAS - PA MAX PG (FULL): 4.3 MMHG
BH CV ECHO MEAS - PA MAX PG: 3.4 MMHG
BH CV ECHO MEAS - PA MAX PG: 6 MMHG
BH CV ECHO MEAS - PA V2 MAX: 122.2 CM/SEC
BH CV ECHO MEAS - PA V2 MAX: 92.3 CM/SEC
BH CV ECHO MEAS - PI END-D VEL: 126.2 CM/SEC
BH CV ECHO MEAS - PULM A REVS DUR: 0.1 SEC
BH CV ECHO MEAS - PULM A REVS DUR: 0.11 SEC
BH CV ECHO MEAS - PULM A REVS VEL: 29.1 CM/SEC
BH CV ECHO MEAS - PULM A REVS VEL: 44.1 CM/SEC
BH CV ECHO MEAS - PULM DIAS VEL: 54.3 CM/SEC
BH CV ECHO MEAS - PULM DIAS VEL: 70.8 CM/SEC
BH CV ECHO MEAS - PULM S/D: 0.64
BH CV ECHO MEAS - PULM S/D: 1.3
BH CV ECHO MEAS - PULM SYS VEL: 45.6 CM/SEC
BH CV ECHO MEAS - PULM SYS VEL: 72.3 CM/SEC
BH CV ECHO MEAS - PVA(V,A): 2.1 CM^2
BH CV ECHO MEAS - PVA(V,A): 2.4 CM^2
BH CV ECHO MEAS - PVA(V,D): 2.1 CM^2
BH CV ECHO MEAS - PVA(V,D): 2.4 CM^2
BH CV ECHO MEAS - QP/QS: 0.63
BH CV ECHO MEAS - QP/QS: 0.93
BH CV ECHO MEAS - RAP SYSTOLE: 10 MMHG
BH CV ECHO MEAS - RAP SYSTOLE: 3 MMHG
BH CV ECHO MEAS - RV MAX PG: 1.7 MMHG
BH CV ECHO MEAS - RV MAX PG: 2.5 MMHG
BH CV ECHO MEAS - RV MEAN PG: 1 MMHG
BH CV ECHO MEAS - RV MEAN PG: 1 MMHG
BH CV ECHO MEAS - RV V1 MAX: 65 CM/SEC
BH CV ECHO MEAS - RV V1 MAX: 79.1 CM/SEC
BH CV ECHO MEAS - RV V1 MEAN: 46.6 CM/SEC
BH CV ECHO MEAS - RV V1 MEAN: 55.6 CM/SEC
BH CV ECHO MEAS - RV V1 VTI: 12.8 CM
BH CV ECHO MEAS - RV V1 VTI: 13.3 CM
BH CV ECHO MEAS - RVDD: 1.9 CM
BH CV ECHO MEAS - RVOT AREA: 2.8 CM^2
BH CV ECHO MEAS - RVOT AREA: 3.9 CM^2
BH CV ECHO MEAS - RVOT DIAM: 1.9 CM
BH CV ECHO MEAS - RVOT DIAM: 2.2 CM
BH CV ECHO MEAS - RVSP: 21 MMHG
BH CV ECHO MEAS - RVSP: 32.7 MMHG
BH CV ECHO MEAS - SI(AO): 90.1 ML/M^2
BH CV ECHO MEAS - SI(AO): 97.9 ML/M^2
BH CV ECHO MEAS - SI(CUBED): 26.4 ML/M^2
BH CV ECHO MEAS - SI(CUBED): 42.2 ML/M^2
BH CV ECHO MEAS - SI(LVOT): 31 ML/M^2
BH CV ECHO MEAS - SI(LVOT): 36.1 ML/M^2
BH CV ECHO MEAS - SI(MOD-SP2): 14.6 ML/M^2
BH CV ECHO MEAS - SI(MOD-SP2): 27.7 ML/M^2
BH CV ECHO MEAS - SI(MOD-SP4): 19.4 ML/M^2
BH CV ECHO MEAS - SI(MOD-SP4): 30.6 ML/M^2
BH CV ECHO MEAS - SI(TEICH): 24.7 ML/M^2
BH CV ECHO MEAS - SI(TEICH): 39.6 ML/M^2
BH CV ECHO MEAS - SUP REN AO DIAM: 1.5 CM
BH CV ECHO MEAS - SV(AO): 156.1 ML
BH CV ECHO MEAS - SV(AO): 161.5 ML
BH CV ECHO MEAS - SV(CUBED): 45.7 ML
BH CV ECHO MEAS - SV(CUBED): 69.6 ML
BH CV ECHO MEAS - SV(LVOT): 53.7 ML
BH CV ECHO MEAS - SV(LVOT): 59.6 ML
BH CV ECHO MEAS - SV(MOD-SP2): 24 ML
BH CV ECHO MEAS - SV(MOD-SP2): 48 ML
BH CV ECHO MEAS - SV(MOD-SP4): 32 ML
BH CV ECHO MEAS - SV(MOD-SP4): 53 ML
BH CV ECHO MEAS - SV(RVOT): 37.7 ML
BH CV ECHO MEAS - SV(RVOT): 50.2 ML
BH CV ECHO MEAS - SV(TEICH): 42.7 ML
BH CV ECHO MEAS - SV(TEICH): 65.4 ML
BH CV ECHO MEAS - TAPSE (>1.6): 1.7 CM2
BH CV ECHO MEAS - TAPSE (>1.6): 2.2 CM2
BH CV ECHO MEAS - TR MAX VEL: 213.1 CM/SEC
BH CV ECHO MEAS - TR MAX VEL: 238 CM/SEC
BH CV UPPER VENOUS LEFT AXILLARY AUGMENT: NORMAL
BH CV UPPER VENOUS LEFT AXILLARY COMPETENT: NORMAL
BH CV UPPER VENOUS LEFT AXILLARY COMPRESS: NORMAL
BH CV UPPER VENOUS LEFT AXILLARY PHASIC: NORMAL
BH CV UPPER VENOUS LEFT AXILLARY SPONT: NORMAL
BH CV UPPER VENOUS LEFT BASILIC FOREARM COMPRESS: NORMAL
BH CV UPPER VENOUS LEFT BASILIC UPPER COMPRESS: NORMAL
BH CV UPPER VENOUS LEFT BRACHIAL COMPRESS: NORMAL
BH CV UPPER VENOUS LEFT CEPHALIC FOREARM COMPRESS: NORMAL
BH CV UPPER VENOUS LEFT CEPHALIC UPPER COMPRESS: NORMAL
BH CV UPPER VENOUS LEFT INTERNAL JUGULAR AUGMENT: NORMAL
BH CV UPPER VENOUS LEFT INTERNAL JUGULAR COMPETENT: NORMAL
BH CV UPPER VENOUS LEFT INTERNAL JUGULAR COMPRESS: NORMAL
BH CV UPPER VENOUS LEFT INTERNAL JUGULAR PHASIC: NORMAL
BH CV UPPER VENOUS LEFT INTERNAL JUGULAR SPONT: NORMAL
BH CV UPPER VENOUS LEFT RADIAL COMPRESS: NORMAL
BH CV UPPER VENOUS LEFT SUBCLAVIAN AUGMENT: NORMAL
BH CV UPPER VENOUS LEFT SUBCLAVIAN COMPETENT: NORMAL
BH CV UPPER VENOUS LEFT SUBCLAVIAN COMPRESS: NORMAL
BH CV UPPER VENOUS LEFT SUBCLAVIAN PHASIC: NORMAL
BH CV UPPER VENOUS LEFT SUBCLAVIAN SPONT: NORMAL
BH CV UPPER VENOUS LEFT ULNAR COMPRESS: NORMAL
BH CV UPPER VENOUS RIGHT INTERNAL JUGULAR AUGMENT: NORMAL
BH CV UPPER VENOUS RIGHT INTERNAL JUGULAR COMPETENT: NORMAL
BH CV UPPER VENOUS RIGHT INTERNAL JUGULAR COMPRESS: NORMAL
BH CV UPPER VENOUS RIGHT INTERNAL JUGULAR PHASIC: NORMAL
BH CV UPPER VENOUS RIGHT INTERNAL JUGULAR SPONT: NORMAL
BH CV UPPER VENOUS RIGHT SUBCLAVIAN AUGMENT: NORMAL
BH CV UPPER VENOUS RIGHT SUBCLAVIAN COMPETENT: NORMAL
BH CV UPPER VENOUS RIGHT SUBCLAVIAN COMPRESS: NORMAL
BH CV UPPER VENOUS RIGHT SUBCLAVIAN PHASIC: NORMAL
BH CV UPPER VENOUS RIGHT SUBCLAVIAN SPONT: NORMAL
BH CV VAS BP RIGHT ARM: NORMAL MMHG
BH CV XLRA - RV BASE: 2.4 CM
BH CV XLRA - RV BASE: 2.4 CM
BH CV XLRA - RV LENGTH: 5.8 CM
BH CV XLRA - RV MID: 1.9 CM
BH CV XLRA - TDI S': 14.8 CM/SEC
BH CV XLRA - TDI S': 22 CM/SEC
BILIRUB CONJ SERPL-MCNC: 0.2 MG/DL (ref 0–0.3)
BILIRUB CONJ SERPL-MCNC: 0.3 MG/DL (ref 0–0.3)
BILIRUB INDIRECT SERPL-MCNC: 0.4 MG/DL
BILIRUB INDIRECT SERPL-MCNC: 0.6 MG/DL
BILIRUB SERPL-MCNC: 0.3 MG/DL (ref 0.1–1.2)
BILIRUB SERPL-MCNC: 0.4 MG/DL (ref 0.1–1.2)
BILIRUB SERPL-MCNC: 0.5 MG/DL (ref 0.1–1.2)
BILIRUB SERPL-MCNC: 0.5 MG/DL (ref 0.1–1.2)
BILIRUB SERPL-MCNC: 0.6 MG/DL (ref 0.1–1.2)
BILIRUB SERPL-MCNC: 0.7 MG/DL (ref 0.1–1.2)
BILIRUB SERPL-MCNC: 0.8 MG/DL (ref 0.1–1.2)
BILIRUB SERPL-MCNC: 0.9 MG/DL (ref 0.1–1.2)
BILIRUB SERPL-MCNC: 1.4 MG/DL (ref 0.1–1.2)
BILIRUB SERPL-MCNC: 1.6 MG/DL (ref 0.1–1.2)
BLD GP AB SCN SERPL QL: NEGATIVE
BUN BLD-MCNC: 10 MG/DL (ref 6–20)
BUN BLD-MCNC: 11 MG/DL (ref 6–20)
BUN BLD-MCNC: 13 MG/DL (ref 6–20)
BUN BLD-MCNC: 14 MG/DL (ref 6–20)
BUN BLD-MCNC: 15 MG/DL (ref 6–20)
BUN BLD-MCNC: 16 MG/DL (ref 6–20)
BUN BLD-MCNC: 17 MG/DL (ref 6–20)
BUN BLD-MCNC: 18 MG/DL (ref 6–20)
BUN BLD-MCNC: 19 MG/DL (ref 6–20)
BUN BLD-MCNC: 20 MG/DL (ref 6–20)
BUN BLD-MCNC: 21 MG/DL (ref 6–20)
BUN BLD-MCNC: 23 MG/DL (ref 6–20)
BUN BLD-MCNC: 26 MG/DL (ref 6–20)
BUN BLD-MCNC: 29 MG/DL (ref 6–20)
BUN BLD-MCNC: 5 MG/DL (ref 6–20)
BUN BLD-MCNC: 8 MG/DL (ref 6–20)
BUN BLD-MCNC: 9 MG/DL (ref 6–20)
BUN/CREAT SERPL: 12.5 (ref 7–25)
BUN/CREAT SERPL: 16.4 (ref 7.3–30)
BUN/CREAT SERPL: 16.7 (ref 7.3–30)
BUN/CREAT SERPL: 18.2 (ref 7–25)
BUN/CREAT SERPL: 18.8 (ref 7.3–30)
BUN/CREAT SERPL: 19.1 (ref 7–25)
BUN/CREAT SERPL: 21.7 (ref 7–25)
BUN/CREAT SERPL: 22.4 (ref 7.3–30)
BUN/CREAT SERPL: 23.2 (ref 7.3–30)
BUN/CREAT SERPL: 23.9 (ref 7–25)
BUN/CREAT SERPL: 24.1 (ref 7.3–30)
BUN/CREAT SERPL: 24.4 (ref 7.3–30)
BUN/CREAT SERPL: 25.4 (ref 7.3–30)
BUN/CREAT SERPL: 26.8 (ref 7–25)
BUN/CREAT SERPL: 27 (ref 7.3–30)
BUN/CREAT SERPL: 27 (ref 7–25)
BUN/CREAT SERPL: 27.8 (ref 7–25)
BUN/CREAT SERPL: 27.9 (ref 7.3–30)
BUN/CREAT SERPL: 28.6 (ref 7–25)
BUN/CREAT SERPL: 29.4 (ref 7.3–30)
BUN/CREAT SERPL: 30 (ref 7–25)
BUN/CREAT SERPL: 31.3 (ref 7.3–30)
BUN/CREAT SERPL: 32.7 (ref 7–25)
BUN/CREAT SERPL: 34 (ref 7–25)
BUN/CREAT SERPL: 34.5 (ref 7–25)
BUN/CREAT SERPL: 37 (ref 7–25)
BUN/CREAT SERPL: 37.5 (ref 7–25)
BUN/CREAT SERPL: 40.4 (ref 7.3–30)
BUN/CREAT SERPL: 44.8 (ref 7–25)
BUN/CREAT SERPL: 47.5 (ref 7–25)
CALCIUM SPEC-SCNC: 7.9 MG/DL (ref 8.6–10.5)
CALCIUM SPEC-SCNC: 8.1 MG/DL (ref 8.6–10.5)
CALCIUM SPEC-SCNC: 8.2 MG/DL (ref 8.5–10.2)
CALCIUM SPEC-SCNC: 8.2 MG/DL (ref 8.6–10.5)
CALCIUM SPEC-SCNC: 8.2 MG/DL (ref 8.6–10.5)
CALCIUM SPEC-SCNC: 8.4 MG/DL (ref 8.6–10.5)
CALCIUM SPEC-SCNC: 8.5 MG/DL (ref 8.5–10.2)
CALCIUM SPEC-SCNC: 8.5 MG/DL (ref 8.6–10.5)
CALCIUM SPEC-SCNC: 8.5 MG/DL (ref 8.6–10.5)
CALCIUM SPEC-SCNC: 8.6 MG/DL (ref 8.5–10.2)
CALCIUM SPEC-SCNC: 8.6 MG/DL (ref 8.5–10.2)
CALCIUM SPEC-SCNC: 8.6 MG/DL (ref 8.6–10.5)
CALCIUM SPEC-SCNC: 8.6 MG/DL (ref 8.6–10.5)
CALCIUM SPEC-SCNC: 8.7 MG/DL (ref 8.5–10.2)
CALCIUM SPEC-SCNC: 8.7 MG/DL (ref 8.6–10.5)
CALCIUM SPEC-SCNC: 8.8 MG/DL (ref 8.5–10.2)
CALCIUM SPEC-SCNC: 8.8 MG/DL (ref 8.6–10.5)
CALCIUM SPEC-SCNC: 8.8 MG/DL (ref 8.6–10.5)
CALCIUM SPEC-SCNC: 8.9 MG/DL (ref 8.5–10.2)
CALCIUM SPEC-SCNC: 8.9 MG/DL (ref 8.6–10.5)
CALCIUM SPEC-SCNC: 9.1 MG/DL (ref 8.5–10.2)
CALCIUM SPEC-SCNC: 9.1 MG/DL (ref 8.5–10.2)
CALCIUM SPEC-SCNC: 9.1 MG/DL (ref 8.6–10.5)
CALCIUM SPEC-SCNC: 9.2 MG/DL (ref 8.5–10.2)
CALCIUM SPEC-SCNC: 9.3 MG/DL (ref 8.5–10.2)
CALCIUM SPEC-SCNC: 9.4 MG/DL (ref 8.6–10.5)
CALCIUM SPEC-SCNC: 9.5 MG/DL (ref 8.5–10.2)
CALCIUM SPEC-SCNC: 9.6 MG/DL (ref 8.5–10.2)
CHLORIDE SERPL-SCNC: 100 MMOL/L (ref 98–107)
CHLORIDE SERPL-SCNC: 102 MMOL/L (ref 98–107)
CHLORIDE SERPL-SCNC: 81 MMOL/L (ref 98–107)
CHLORIDE SERPL-SCNC: 81 MMOL/L (ref 98–107)
CHLORIDE SERPL-SCNC: 88 MMOL/L (ref 98–107)
CHLORIDE SERPL-SCNC: 90 MMOL/L (ref 98–107)
CHLORIDE SERPL-SCNC: 92 MMOL/L (ref 98–107)
CHLORIDE SERPL-SCNC: 93 MMOL/L (ref 98–107)
CHLORIDE SERPL-SCNC: 94 MMOL/L (ref 98–107)
CHLORIDE SERPL-SCNC: 94 MMOL/L (ref 98–107)
CHLORIDE SERPL-SCNC: 95 MMOL/L (ref 98–107)
CHLORIDE SERPL-SCNC: 96 MMOL/L (ref 98–107)
CHLORIDE SERPL-SCNC: 97 MMOL/L (ref 98–107)
CHLORIDE SERPL-SCNC: 98 MMOL/L (ref 98–107)
CHLORIDE SERPL-SCNC: 98 MMOL/L (ref 98–107)
CHLORIDE SERPL-SCNC: 99 MMOL/L (ref 98–107)
CHLORIDE SERPL-SCNC: 99 MMOL/L (ref 98–107)
CHOLEST SERPL-MCNC: 143 MG/DL (ref 0–200)
CLUMPED PLATELETS: PRESENT
CO2 SERPL-SCNC: 25.3 MMOL/L (ref 22–29)
CO2 SERPL-SCNC: 25.4 MMOL/L (ref 22–29)
CO2 SERPL-SCNC: 26.1 MMOL/L (ref 22–29)
CO2 SERPL-SCNC: 26.5 MMOL/L (ref 22–29)
CO2 SERPL-SCNC: 26.5 MMOL/L (ref 22–29)
CO2 SERPL-SCNC: 26.6 MMOL/L (ref 22–29)
CO2 SERPL-SCNC: 26.8 MMOL/L (ref 22–29)
CO2 SERPL-SCNC: 27.5 MMOL/L (ref 22–29)
CO2 SERPL-SCNC: 27.7 MMOL/L (ref 22–29)
CO2 SERPL-SCNC: 27.8 MMOL/L (ref 22–29)
CO2 SERPL-SCNC: 28 MMOL/L (ref 22–29)
CO2 SERPL-SCNC: 28.2 MMOL/L (ref 22–29)
CO2 SERPL-SCNC: 28.2 MMOL/L (ref 22–29)
CO2 SERPL-SCNC: 28.3 MMOL/L (ref 22–29)
CO2 SERPL-SCNC: 28.6 MMOL/L (ref 22–29)
CO2 SERPL-SCNC: 28.7 MMOL/L (ref 22–29)
CO2 SERPL-SCNC: 28.8 MMOL/L (ref 22–29)
CO2 SERPL-SCNC: 29 MMOL/L (ref 22–29)
CO2 SERPL-SCNC: 29.3 MMOL/L (ref 22–29)
CO2 SERPL-SCNC: 29.6 MMOL/L (ref 22–29)
CO2 SERPL-SCNC: 29.7 MMOL/L (ref 22–29)
CO2 SERPL-SCNC: 29.7 MMOL/L (ref 22–29)
CO2 SERPL-SCNC: 29.9 MMOL/L (ref 22–29)
CO2 SERPL-SCNC: 30.5 MMOL/L (ref 22–29)
CO2 SERPL-SCNC: 31.4 MMOL/L (ref 22–29)
CO2 SERPL-SCNC: 31.8 MMOL/L (ref 22–29)
CO2 SERPL-SCNC: 32.9 MMOL/L (ref 22–29)
CREAT BLD-MCNC: 0.4 MG/DL (ref 0.57–1)
CREAT BLD-MCNC: 0.44 MG/DL (ref 0.57–1)
CREAT BLD-MCNC: 0.46 MG/DL (ref 0.57–1)
CREAT BLD-MCNC: 0.46 MG/DL (ref 0.57–1)
CREAT BLD-MCNC: 0.47 MG/DL (ref 0.57–1)
CREAT BLD-MCNC: 0.47 MG/DL (ref 0.57–1)
CREAT BLD-MCNC: 0.5 MG/DL (ref 0.57–1)
CREAT BLD-MCNC: 0.54 MG/DL (ref 0.57–1)
CREAT BLD-MCNC: 0.55 MG/DL (ref 0.57–1)
CREAT BLD-MCNC: 0.55 MG/DL (ref 0.57–1)
CREAT BLD-MCNC: 0.56 MG/DL (ref 0.57–1)
CREAT BLD-MCNC: 0.57 MG/DL (ref 0.6–1.1)
CREAT BLD-MCNC: 0.58 MG/DL (ref 0.57–1)
CREAT BLD-MCNC: 0.58 MG/DL (ref 0.6–1.1)
CREAT BLD-MCNC: 0.61 MG/DL (ref 0.57–1)
CREAT BLD-MCNC: 0.63 MG/DL (ref 0.6–1.1)
CREAT BLD-MCNC: 0.63 MG/DL (ref 0.6–1.1)
CREAT BLD-MCNC: 0.64 MG/DL (ref 0.6–1.1)
CREAT BLD-MCNC: 0.67 MG/DL (ref 0.57–1)
CREAT BLD-MCNC: 0.67 MG/DL (ref 0.6–1.1)
CREAT BLD-MCNC: 0.68 MG/DL (ref 0.6–1.1)
CREAT BLD-MCNC: 0.68 MG/DL (ref 0.6–1.1)
CREAT BLD-MCNC: 0.74 MG/DL (ref 0.57–1)
CREAT BLD-MCNC: 0.76 MG/DL (ref 0.6–1.1)
CREAT BLD-MCNC: 0.78 MG/DL (ref 0.6–1.1)
CREAT BLD-MCNC: 0.78 MG/DL (ref 0.6–1.1)
CREAT BLD-MCNC: 0.8 MG/DL (ref 0.6–1.1)
CREAT BLD-MCNC: 0.82 MG/DL (ref 0.6–1.1)
CREAT BLDA-MCNC: 0.7 MG/DL (ref 0.6–1.3)
CREAT BLDA-MCNC: 0.8 MG/DL (ref 0.6–1.3)
CROSSMATCH INTERPRETATION: NORMAL
CROSSMATCH INTERPRETATION: NORMAL
CYTO UR: NORMAL
CYTO UR: NORMAL
DEPRECATED RDW RBC AUTO: 43.6 FL (ref 37–49)
DEPRECATED RDW RBC AUTO: 45.5 FL (ref 37–49)
DEPRECATED RDW RBC AUTO: 46.2 FL (ref 37–49)
DEPRECATED RDW RBC AUTO: 51.7 FL (ref 37–49)
DEPRECATED RDW RBC AUTO: 54.3 FL (ref 37–49)
DEPRECATED RDW RBC AUTO: 54.3 FL (ref 37–54)
DEPRECATED RDW RBC AUTO: 54.4 FL (ref 37–54)
DEPRECATED RDW RBC AUTO: 54.5 FL (ref 37–49)
DEPRECATED RDW RBC AUTO: 54.5 FL (ref 37–54)
DEPRECATED RDW RBC AUTO: 54.6 FL (ref 37–49)
DEPRECATED RDW RBC AUTO: 55.1 FL (ref 37–49)
DEPRECATED RDW RBC AUTO: 55.1 FL (ref 37–54)
DEPRECATED RDW RBC AUTO: 55.3 FL (ref 37–49)
DEPRECATED RDW RBC AUTO: 55.4 FL (ref 37–54)
DEPRECATED RDW RBC AUTO: 55.6 FL (ref 37–54)
DEPRECATED RDW RBC AUTO: 55.6 FL (ref 37–54)
DEPRECATED RDW RBC AUTO: 55.7 FL (ref 37–49)
DEPRECATED RDW RBC AUTO: 55.9 FL (ref 37–54)
DEPRECATED RDW RBC AUTO: 56 FL (ref 37–54)
DEPRECATED RDW RBC AUTO: 56 FL (ref 37–54)
DEPRECATED RDW RBC AUTO: 56.3 FL (ref 37–49)
DEPRECATED RDW RBC AUTO: 56.4 FL (ref 37–54)
DEPRECATED RDW RBC AUTO: 56.5 FL (ref 37–49)
DEPRECATED RDW RBC AUTO: 57.1 FL (ref 37–54)
DEPRECATED RDW RBC AUTO: 57.1 FL (ref 37–54)
DEPRECATED RDW RBC AUTO: 57.7 FL (ref 37–49)
DEPRECATED RDW RBC AUTO: 58.5 FL (ref 37–54)
DEPRECATED RDW RBC AUTO: 58.9 FL (ref 37–49)
DEPRECATED RDW RBC AUTO: 60.5 FL (ref 37–49)
DEPRECATED RDW RBC AUTO: 60.7 FL (ref 37–49)
DEPRECATED RDW RBC AUTO: 62.5 FL (ref 37–54)
DEPRECATED RDW RBC AUTO: 63.6 FL (ref 37–54)
DEPRECATED RDW RBC AUTO: 63.6 FL (ref 37–54)
DEPRECATED RDW RBC AUTO: 64.3 FL (ref 37–49)
DEPRECATED RDW RBC AUTO: 64.4 FL (ref 37–49)
E/E' RATIO: 8.5
EOSINOPHIL # BLD AUTO: 0 10*3/MM3 (ref 0–0.36)
EOSINOPHIL # BLD AUTO: 0 10*3/MM3 (ref 0–0.36)
EOSINOPHIL # BLD AUTO: 0 10*3/MM3 (ref 0–0.7)
EOSINOPHIL # BLD AUTO: 0.01 10*3/MM3 (ref 0–0.36)
EOSINOPHIL # BLD AUTO: 0.01 10*3/MM3 (ref 0–0.7)
EOSINOPHIL # BLD AUTO: 0.02 10*3/MM3 (ref 0–0.36)
EOSINOPHIL # BLD AUTO: 0.02 10*3/MM3 (ref 0–0.7)
EOSINOPHIL # BLD AUTO: 0.03 10*3/MM3 (ref 0–0.36)
EOSINOPHIL # BLD AUTO: 0.03 10*3/MM3 (ref 0–0.7)
EOSINOPHIL # BLD AUTO: 0.04 10*3/MM3 (ref 0–0.7)
EOSINOPHIL # BLD AUTO: 0.05 10*3/MM3 (ref 0–0.36)
EOSINOPHIL # BLD AUTO: 0.05 10*3/MM3 (ref 0–0.7)
EOSINOPHIL # BLD AUTO: 0.05 10*3/MM3 (ref 0–0.7)
EOSINOPHIL # BLD AUTO: 0.06 10*3/MM3 (ref 0–0.7)
EOSINOPHIL # BLD AUTO: 0.06 10*3/MM3 (ref 0–0.7)
EOSINOPHIL # BLD AUTO: 0.07 10*3/MM3 (ref 0–0.36)
EOSINOPHIL # BLD AUTO: 0.08 10*3/MM3 (ref 0–0.36)
EOSINOPHIL # BLD AUTO: 0.09 10*3/MM3 (ref 0–0.7)
EOSINOPHIL # BLD AUTO: 0.11 10*3/MM3 (ref 0–0.36)
EOSINOPHIL # BLD AUTO: 0.15 10*3/MM3 (ref 0–0.36)
EOSINOPHIL # BLD AUTO: 0.23 10*3/MM3 (ref 0–0.36)
EOSINOPHIL # BLD MANUAL: 0.09 10*3/MM3 (ref 0–0.7)
EOSINOPHIL NFR BLD AUTO: 0 % (ref 0.3–6.2)
EOSINOPHIL NFR BLD AUTO: 0 % (ref 1–5)
EOSINOPHIL NFR BLD AUTO: 0 % (ref 1–5)
EOSINOPHIL NFR BLD AUTO: 0.1 % (ref 0.3–6.2)
EOSINOPHIL NFR BLD AUTO: 0.1 % (ref 0.3–6.2)
EOSINOPHIL NFR BLD AUTO: 0.1 % (ref 1–5)
EOSINOPHIL NFR BLD AUTO: 0.2 % (ref 0.3–6.2)
EOSINOPHIL NFR BLD AUTO: 0.2 % (ref 1–5)
EOSINOPHIL NFR BLD AUTO: 0.3 % (ref 1–5)
EOSINOPHIL NFR BLD AUTO: 0.4 % (ref 0.3–6.2)
EOSINOPHIL NFR BLD AUTO: 0.4 % (ref 1–5)
EOSINOPHIL NFR BLD AUTO: 0.4 % (ref 1–5)
EOSINOPHIL NFR BLD AUTO: 0.5 % (ref 0.3–6.2)
EOSINOPHIL NFR BLD AUTO: 0.5 % (ref 1–5)
EOSINOPHIL NFR BLD AUTO: 0.6 % (ref 0.3–6.2)
EOSINOPHIL NFR BLD AUTO: 0.6 % (ref 1–5)
EOSINOPHIL NFR BLD AUTO: 0.8 % (ref 0.3–6.2)
EOSINOPHIL NFR BLD AUTO: 0.8 % (ref 1–5)
EOSINOPHIL NFR BLD AUTO: 1 % (ref 1–5)
EOSINOPHIL NFR BLD AUTO: 1.5 % (ref 0.3–6.2)
EOSINOPHIL NFR BLD AUTO: 1.5 % (ref 1–5)
EOSINOPHIL NFR BLD AUTO: 1.6 % (ref 0.3–6.2)
EOSINOPHIL NFR BLD AUTO: 1.7 % (ref 1–5)
EOSINOPHIL NFR BLD AUTO: 2.9 % (ref 0.3–6.2)
EOSINOPHIL NFR BLD AUTO: 3.1 % (ref 0.3–6.2)
EOSINOPHIL NFR BLD AUTO: 3.4 % (ref 1–5)
EOSINOPHIL NFR BLD AUTO: 3.8 % (ref 1–5)
EOSINOPHIL NFR BLD AUTO: 6.4 % (ref 1–5)
EOSINOPHIL NFR BLD MANUAL: 1 % (ref 0.3–6.2)
ERYTHROCYTE [DISTWIDTH] IN BLOOD BY AUTOMATED COUNT: 13.3 % (ref 11.7–14.5)
ERYTHROCYTE [DISTWIDTH] IN BLOOD BY AUTOMATED COUNT: 14.2 % (ref 11.7–14.5)
ERYTHROCYTE [DISTWIDTH] IN BLOOD BY AUTOMATED COUNT: 15 % (ref 11.7–14.5)
ERYTHROCYTE [DISTWIDTH] IN BLOOD BY AUTOMATED COUNT: 15.5 % (ref 11.7–14.5)
ERYTHROCYTE [DISTWIDTH] IN BLOOD BY AUTOMATED COUNT: 15.5 % (ref 11.7–14.5)
ERYTHROCYTE [DISTWIDTH] IN BLOOD BY AUTOMATED COUNT: 15.6 % (ref 11.7–13)
ERYTHROCYTE [DISTWIDTH] IN BLOOD BY AUTOMATED COUNT: 15.7 % (ref 11.7–14.5)
ERYTHROCYTE [DISTWIDTH] IN BLOOD BY AUTOMATED COUNT: 15.7 % (ref 11.7–14.5)
ERYTHROCYTE [DISTWIDTH] IN BLOOD BY AUTOMATED COUNT: 15.8 % (ref 11.7–13)
ERYTHROCYTE [DISTWIDTH] IN BLOOD BY AUTOMATED COUNT: 15.9 % (ref 11.7–13)
ERYTHROCYTE [DISTWIDTH] IN BLOOD BY AUTOMATED COUNT: 15.9 % (ref 11.7–13)
ERYTHROCYTE [DISTWIDTH] IN BLOOD BY AUTOMATED COUNT: 15.9 % (ref 11.7–14.5)
ERYTHROCYTE [DISTWIDTH] IN BLOOD BY AUTOMATED COUNT: 16 % (ref 11.7–13)
ERYTHROCYTE [DISTWIDTH] IN BLOOD BY AUTOMATED COUNT: 16 % (ref 11.7–13)
ERYTHROCYTE [DISTWIDTH] IN BLOOD BY AUTOMATED COUNT: 16.3 % (ref 11.7–13)
ERYTHROCYTE [DISTWIDTH] IN BLOOD BY AUTOMATED COUNT: 16.5 % (ref 11.7–13)
ERYTHROCYTE [DISTWIDTH] IN BLOOD BY AUTOMATED COUNT: 16.6 % (ref 11.7–14.5)
ERYTHROCYTE [DISTWIDTH] IN BLOOD BY AUTOMATED COUNT: 16.7 % (ref 11.7–13)
ERYTHROCYTE [DISTWIDTH] IN BLOOD BY AUTOMATED COUNT: 16.7 % (ref 11.7–14.5)
ERYTHROCYTE [DISTWIDTH] IN BLOOD BY AUTOMATED COUNT: 16.8 % (ref 11.7–13)
ERYTHROCYTE [DISTWIDTH] IN BLOOD BY AUTOMATED COUNT: 16.9 % (ref 11.7–13)
ERYTHROCYTE [DISTWIDTH] IN BLOOD BY AUTOMATED COUNT: 16.9 % (ref 11.7–14.5)
ERYTHROCYTE [DISTWIDTH] IN BLOOD BY AUTOMATED COUNT: 17.2 % (ref 11.7–13)
ERYTHROCYTE [DISTWIDTH] IN BLOOD BY AUTOMATED COUNT: 17.2 % (ref 11.7–14.5)
ERYTHROCYTE [DISTWIDTH] IN BLOOD BY AUTOMATED COUNT: 17.3 % (ref 11.7–13)
ERYTHROCYTE [DISTWIDTH] IN BLOOD BY AUTOMATED COUNT: 17.3 % (ref 11.7–13)
ERYTHROCYTE [DISTWIDTH] IN BLOOD BY AUTOMATED COUNT: 17.3 % (ref 11.7–14.5)
ERYTHROCYTE [DISTWIDTH] IN BLOOD BY AUTOMATED COUNT: 17.5 % (ref 11.7–14.5)
ERYTHROCYTE [DISTWIDTH] IN BLOOD BY AUTOMATED COUNT: 17.8 % (ref 11.7–14.5)
ERYTHROCYTE [DISTWIDTH] IN BLOOD BY AUTOMATED COUNT: 17.9 % (ref 11.7–14.5)
ERYTHROCYTE [DISTWIDTH] IN BLOOD BY AUTOMATED COUNT: 18.6 % (ref 11.7–14.5)
ERYTHROCYTE [DISTWIDTH] IN BLOOD BY AUTOMATED COUNT: 19 % (ref 11.7–14.5)
ERYTHROCYTE [DISTWIDTH] IN BLOOD BY AUTOMATED COUNT: 19.1 % (ref 11.7–13)
ERYTHROCYTE [DISTWIDTH] IN BLOOD BY AUTOMATED COUNT: 19.3 % (ref 11.7–13)
ERYTHROCYTE [DISTWIDTH] IN BLOOD BY AUTOMATED COUNT: 19.5 % (ref 11.7–13)
GFR SERPL CREATININE-BSD FRML MDRD: 101 ML/MIN/1.73
GFR SERPL CREATININE-BSD FRML MDRD: 107 ML/MIN/1.73
GFR SERPL CREATININE-BSD FRML MDRD: 107 ML/MIN/1.73
GFR SERPL CREATININE-BSD FRML MDRD: 109 ML/MIN/1.73
GFR SERPL CREATININE-BSD FRML MDRD: 112 ML/MIN/1.73
GFR SERPL CREATININE-BSD FRML MDRD: 114 ML/MIN/1.73
GFR SERPL CREATININE-BSD FRML MDRD: 114 ML/MIN/1.73
GFR SERPL CREATININE-BSD FRML MDRD: 116 ML/MIN/1.73
GFR SERPL CREATININE-BSD FRML MDRD: 127 ML/MIN/1.73
GFR SERPL CREATININE-BSD FRML MDRD: 137 ML/MIN/1.73
GFR SERPL CREATININE-BSD FRML MDRD: 137 ML/MIN/1.73
GFR SERPL CREATININE-BSD FRML MDRD: 140 ML/MIN/1.73
GFR SERPL CREATININE-BSD FRML MDRD: 140 ML/MIN/1.73
GFR SERPL CREATININE-BSD FRML MDRD: 147 ML/MIN/1.73
GFR SERPL CREATININE-BSD FRML MDRD: 72 ML/MIN/1.73
GFR SERPL CREATININE-BSD FRML MDRD: 74 ML/MIN/1.73
GFR SERPL CREATININE-BSD FRML MDRD: 76 ML/MIN/1.73
GFR SERPL CREATININE-BSD FRML MDRD: 76 ML/MIN/1.73
GFR SERPL CREATININE-BSD FRML MDRD: 78 ML/MIN/1.73
GFR SERPL CREATININE-BSD FRML MDRD: 81 ML/MIN/1.73
GFR SERPL CREATININE-BSD FRML MDRD: 89 ML/MIN/1.73
GFR SERPL CREATININE-BSD FRML MDRD: 89 ML/MIN/1.73
GFR SERPL CREATININE-BSD FRML MDRD: 91 ML/MIN/1.73
GFR SERPL CREATININE-BSD FRML MDRD: 91 ML/MIN/1.73
GFR SERPL CREATININE-BSD FRML MDRD: 96 ML/MIN/1.73
GFR SERPL CREATININE-BSD FRML MDRD: 97 ML/MIN/1.73
GFR SERPL CREATININE-BSD FRML MDRD: 97 ML/MIN/1.73
GFR SERPL CREATININE-BSD FRML MDRD: >150 ML/MIN/1.73
GLOBULIN UR ELPH-MCNC: 2.3 GM/DL (ref 1.8–3.5)
GLOBULIN UR ELPH-MCNC: 2.5 GM/DL (ref 1.8–3.5)
GLOBULIN UR ELPH-MCNC: 2.5 GM/DL (ref 1.8–3.5)
GLOBULIN UR ELPH-MCNC: 2.6 GM/DL
GLOBULIN UR ELPH-MCNC: 2.6 GM/DL (ref 1.8–3.5)
GLOBULIN UR ELPH-MCNC: 2.7 GM/DL
GLOBULIN UR ELPH-MCNC: 2.7 GM/DL (ref 1.8–3.5)
GLOBULIN UR ELPH-MCNC: 2.8 GM/DL
GLOBULIN UR ELPH-MCNC: 2.8 GM/DL (ref 1.8–3.5)
GLOBULIN UR ELPH-MCNC: 2.9 GM/DL (ref 1.8–3.5)
GLOBULIN UR ELPH-MCNC: 2.9 GM/DL (ref 1.8–3.5)
GLOBULIN UR ELPH-MCNC: 3 GM/DL
GLOBULIN UR ELPH-MCNC: 3 GM/DL
GLOBULIN UR ELPH-MCNC: 3.1 GM/DL (ref 1.8–3.5)
GLUCOSE BLD-MCNC: 100 MG/DL (ref 65–99)
GLUCOSE BLD-MCNC: 102 MG/DL (ref 65–99)
GLUCOSE BLD-MCNC: 104 MG/DL (ref 74–124)
GLUCOSE BLD-MCNC: 111 MG/DL (ref 65–99)
GLUCOSE BLD-MCNC: 111 MG/DL (ref 74–124)
GLUCOSE BLD-MCNC: 112 MG/DL (ref 74–124)
GLUCOSE BLD-MCNC: 113 MG/DL (ref 65–99)
GLUCOSE BLD-MCNC: 117 MG/DL (ref 74–124)
GLUCOSE BLD-MCNC: 119 MG/DL (ref 65–99)
GLUCOSE BLD-MCNC: 121 MG/DL (ref 74–124)
GLUCOSE BLD-MCNC: 123 MG/DL (ref 65–99)
GLUCOSE BLD-MCNC: 124 MG/DL (ref 65–99)
GLUCOSE BLD-MCNC: 124 MG/DL (ref 74–124)
GLUCOSE BLD-MCNC: 124 MG/DL (ref 74–124)
GLUCOSE BLD-MCNC: 126 MG/DL (ref 65–99)
GLUCOSE BLD-MCNC: 140 MG/DL (ref 74–124)
GLUCOSE BLD-MCNC: 154 MG/DL (ref 74–124)
GLUCOSE BLD-MCNC: 156 MG/DL (ref 74–124)
GLUCOSE BLD-MCNC: 163 MG/DL (ref 65–99)
GLUCOSE BLD-MCNC: 163 MG/DL (ref 74–124)
GLUCOSE BLD-MCNC: 78 MG/DL (ref 65–99)
GLUCOSE BLD-MCNC: 84 MG/DL (ref 65–99)
GLUCOSE BLD-MCNC: 84 MG/DL (ref 65–99)
GLUCOSE BLD-MCNC: 85 MG/DL (ref 65–99)
GLUCOSE BLD-MCNC: 87 MG/DL (ref 74–124)
GLUCOSE BLD-MCNC: 88 MG/DL (ref 65–99)
GLUCOSE BLD-MCNC: 88 MG/DL (ref 74–124)
GLUCOSE BLD-MCNC: 89 MG/DL (ref 65–99)
GLUCOSE BLD-MCNC: 93 MG/DL (ref 65–99)
GLUCOSE BLD-MCNC: 99 MG/DL (ref 65–99)
HCT VFR BLD AUTO: 27 % (ref 34–45)
HCT VFR BLD AUTO: 28.4 % (ref 34–45)
HCT VFR BLD AUTO: 28.4 % (ref 35.6–45.5)
HCT VFR BLD AUTO: 29.2 % (ref 35.6–45.5)
HCT VFR BLD AUTO: 29.6 % (ref 35.6–45.5)
HCT VFR BLD AUTO: 30 % (ref 35.6–45.5)
HCT VFR BLD AUTO: 30.2 % (ref 35.6–45.5)
HCT VFR BLD AUTO: 31 % (ref 34–45)
HCT VFR BLD AUTO: 31.2 % (ref 34–45)
HCT VFR BLD AUTO: 31.3 % (ref 34–45)
HCT VFR BLD AUTO: 31.3 % (ref 34–45)
HCT VFR BLD AUTO: 31.6 % (ref 34–45)
HCT VFR BLD AUTO: 31.9 % (ref 35.6–45.5)
HCT VFR BLD AUTO: 31.9 % (ref 35.6–45.5)
HCT VFR BLD AUTO: 32.1 % (ref 34–45)
HCT VFR BLD AUTO: 32.2 % (ref 34–45)
HCT VFR BLD AUTO: 32.3 % (ref 35.6–45.5)
HCT VFR BLD AUTO: 32.6 % (ref 34–45)
HCT VFR BLD AUTO: 32.7 % (ref 34–45)
HCT VFR BLD AUTO: 32.8 % (ref 35.6–45.5)
HCT VFR BLD AUTO: 32.9 % (ref 34–45)
HCT VFR BLD AUTO: 33 % (ref 35.6–45.5)
HCT VFR BLD AUTO: 33.5 % (ref 34–45)
HCT VFR BLD AUTO: 33.6 % (ref 34–45)
HCT VFR BLD AUTO: 34.4 % (ref 35.6–45.5)
HCT VFR BLD AUTO: 34.6 % (ref 35.6–45.5)
HCT VFR BLD AUTO: 34.8 % (ref 34–45)
HCT VFR BLD AUTO: 35 % (ref 35.6–45.5)
HCT VFR BLD AUTO: 35.2 % (ref 34–45)
HCT VFR BLD AUTO: 35.3 % (ref 34–45)
HCT VFR BLD AUTO: 35.9 % (ref 35.6–45.5)
HCT VFR BLD AUTO: 36 % (ref 34–45)
HCT VFR BLD AUTO: 39.5 % (ref 35.6–45.5)
HDLC SERPL-MCNC: 38 MG/DL (ref 40–60)
HGB BLD-MCNC: 10 G/DL (ref 11.5–14.9)
HGB BLD-MCNC: 10.1 G/DL (ref 11.9–15.5)
HGB BLD-MCNC: 10.2 G/DL (ref 11.5–14.9)
HGB BLD-MCNC: 10.2 G/DL (ref 11.5–14.9)
HGB BLD-MCNC: 10.2 G/DL (ref 11.9–15.5)
HGB BLD-MCNC: 10.2 G/DL (ref 11.9–15.5)
HGB BLD-MCNC: 10.3 G/DL (ref 11.5–14.9)
HGB BLD-MCNC: 10.3 G/DL (ref 11.9–15.5)
HGB BLD-MCNC: 10.4 G/DL (ref 11.5–14.9)
HGB BLD-MCNC: 10.4 G/DL (ref 11.9–15.5)
HGB BLD-MCNC: 10.5 G/DL (ref 11.9–15.5)
HGB BLD-MCNC: 10.6 G/DL (ref 11.9–15.5)
HGB BLD-MCNC: 10.6 G/DL (ref 11.9–15.5)
HGB BLD-MCNC: 10.7 G/DL (ref 11.5–14.9)
HGB BLD-MCNC: 10.8 G/DL (ref 11.5–14.9)
HGB BLD-MCNC: 10.8 G/DL (ref 11.5–14.9)
HGB BLD-MCNC: 10.9 G/DL (ref 11.9–15.5)
HGB BLD-MCNC: 11 G/DL (ref 11.5–14.9)
HGB BLD-MCNC: 11 G/DL (ref 11.5–14.9)
HGB BLD-MCNC: 11.1 G/DL (ref 11.9–15.5)
HGB BLD-MCNC: 11.3 G/DL (ref 11.5–14.9)
HGB BLD-MCNC: 11.3 G/DL (ref 11.9–15.5)
HGB BLD-MCNC: 11.4 G/DL (ref 11.5–14.9)
HGB BLD-MCNC: 11.4 G/DL (ref 11.9–15.5)
HGB BLD-MCNC: 11.5 G/DL (ref 11.5–14.9)
HGB BLD-MCNC: 12 G/DL (ref 11.5–14.9)
HGB BLD-MCNC: 12.5 G/DL (ref 11.9–15.5)
HGB BLD-MCNC: 8.7 G/DL (ref 11.5–14.9)
HGB BLD-MCNC: 9.6 G/DL (ref 11.9–15.5)
HGB BLD-MCNC: 9.8 G/DL (ref 11.5–14.9)
HGB BLD-MCNC: 9.9 G/DL (ref 11.9–15.5)
HOLD SPECIMEN: NORMAL
IMM GRANULOCYTES # BLD: 0 10*3/MM3 (ref 0–0.03)
IMM GRANULOCYTES # BLD: 0.01 10*3/MM3 (ref 0–0.03)
IMM GRANULOCYTES # BLD: 0.02 10*3/MM3 (ref 0–0.03)
IMM GRANULOCYTES # BLD: 0.03 10*3/MM3 (ref 0–0.03)
IMM GRANULOCYTES # BLD: 0.04 10*3/MM3 (ref 0–0.03)
IMM GRANULOCYTES # BLD: 0.04 10*3/MM3 (ref 0–0.03)
IMM GRANULOCYTES # BLD: 0.05 10*3/MM3 (ref 0–0.03)
IMM GRANULOCYTES # BLD: 0.06 10*3/MM3 (ref 0–0.03)
IMM GRANULOCYTES # BLD: 0.06 10*3/MM3 (ref 0–0.03)
IMM GRANULOCYTES # BLD: 0.07 10*3/MM3 (ref 0–0.03)
IMM GRANULOCYTES # BLD: 0.08 10*3/MM3 (ref 0–0.03)
IMM GRANULOCYTES NFR BLD: 0 % (ref 0–0.5)
IMM GRANULOCYTES NFR BLD: 0.2 % (ref 0–0.5)
IMM GRANULOCYTES NFR BLD: 0.3 % (ref 0–0.5)
IMM GRANULOCYTES NFR BLD: 0.4 % (ref 0–0.5)
IMM GRANULOCYTES NFR BLD: 0.5 % (ref 0–0.5)
IMM GRANULOCYTES NFR BLD: 0.5 % (ref 0–0.5)
IMM GRANULOCYTES NFR BLD: 0.6 % (ref 0–0.5)
IMM GRANULOCYTES NFR BLD: 0.6 % (ref 0–0.5)
IMM GRANULOCYTES NFR BLD: 0.7 % (ref 0–0.5)
IMM GRANULOCYTES NFR BLD: 0.8 % (ref 0–0.5)
IMM GRANULOCYTES NFR BLD: 1 % (ref 0–0.5)
IMM GRANULOCYTES NFR BLD: 1.2 % (ref 0–0.5)
IMM GRANULOCYTES NFR BLD: 1.5 % (ref 0–0.5)
INR PPP: 1.07 (ref 0.9–1.1)
INR PPP: 1.11 (ref 0.9–1.1)
INR PPP: 1.12 (ref 0.9–1.1)
INR PPP: 1.32 (ref 0.9–1.1)
LAB AP CASE REPORT: NORMAL
LAB AP CASE REPORT: NORMAL
LDH SERPL-CCNC: 178 U/L (ref 99–259)
LDH SERPL-CCNC: 230 U/L (ref 99–259)
LDLC SERPL CALC-MCNC: 86 MG/DL (ref 0–100)
LDLC/HDLC SERPL: 2.26 {RATIO}
LEFT ATRIUM VOLUME INDEX: 21 ML/M2
LEFT ATRIUM VOLUME INDEX: 23.6 ML/M2
LYMPHOCYTES # BLD AUTO: 0.24 10*3/MM3 (ref 0.9–4.8)
LYMPHOCYTES # BLD AUTO: 0.59 10*3/MM3 (ref 0.9–4.8)
LYMPHOCYTES # BLD AUTO: 0.63 10*3/MM3 (ref 1–3.5)
LYMPHOCYTES # BLD AUTO: 0.64 10*3/MM3 (ref 1–3.5)
LYMPHOCYTES # BLD AUTO: 0.67 10*3/MM3 (ref 1–3.5)
LYMPHOCYTES # BLD AUTO: 0.7 10*3/MM3 (ref 0.9–4.8)
LYMPHOCYTES # BLD AUTO: 0.72 10*3/MM3 (ref 1–3.5)
LYMPHOCYTES # BLD AUTO: 0.72 10*3/MM3 (ref 1–3.5)
LYMPHOCYTES # BLD AUTO: 0.74 10*3/MM3 (ref 0.9–4.8)
LYMPHOCYTES # BLD AUTO: 0.75 10*3/MM3 (ref 0.9–4.8)
LYMPHOCYTES # BLD AUTO: 0.79 10*3/MM3 (ref 0.9–4.8)
LYMPHOCYTES # BLD AUTO: 0.8 10*3/MM3 (ref 1–3.5)
LYMPHOCYTES # BLD AUTO: 0.83 10*3/MM3 (ref 1–3.5)
LYMPHOCYTES # BLD AUTO: 0.85 10*3/MM3 (ref 1–3.5)
LYMPHOCYTES # BLD AUTO: 0.86 10*3/MM3 (ref 0.9–4.8)
LYMPHOCYTES # BLD AUTO: 0.87 10*3/MM3 (ref 0.9–4.8)
LYMPHOCYTES # BLD AUTO: 0.91 10*3/MM3 (ref 1–3.5)
LYMPHOCYTES # BLD AUTO: 0.92 10*3/MM3 (ref 0.9–4.8)
LYMPHOCYTES # BLD AUTO: 0.92 10*3/MM3 (ref 1–3.5)
LYMPHOCYTES # BLD AUTO: 0.97 10*3/MM3 (ref 0.9–4.8)
LYMPHOCYTES # BLD AUTO: 0.97 10*3/MM3 (ref 0.9–4.8)
LYMPHOCYTES # BLD AUTO: 0.97 10*3/MM3 (ref 1–3.5)
LYMPHOCYTES # BLD AUTO: 0.99 10*3/MM3 (ref 1–3.5)
LYMPHOCYTES # BLD AUTO: 0.99 10*3/MM3 (ref 1–3.5)
LYMPHOCYTES # BLD AUTO: 1.04 10*3/MM3 (ref 1–3.5)
LYMPHOCYTES # BLD AUTO: 1.13 10*3/MM3 (ref 0.9–4.8)
LYMPHOCYTES # BLD AUTO: 1.15 10*3/MM3 (ref 1–3.5)
LYMPHOCYTES # BLD AUTO: 1.25 10*3/MM3 (ref 0.9–4.8)
LYMPHOCYTES # BLD AUTO: 1.25 10*3/MM3 (ref 1–3.5)
LYMPHOCYTES # BLD AUTO: 1.28 10*3/MM3 (ref 1–3.5)
LYMPHOCYTES # BLD AUTO: 1.3 10*3/MM3 (ref 1–3.5)
LYMPHOCYTES # BLD AUTO: 1.73 10*3/MM3 (ref 0.9–4.8)
LYMPHOCYTES # BLD MANUAL: 1.16 10*3/MM3 (ref 0.9–4.8)
LYMPHOCYTES # BLD MANUAL: 1.9 10*3/MM3 (ref 0.9–4.8)
LYMPHOCYTES NFR BLD AUTO: 10.5 % (ref 20–49)
LYMPHOCYTES NFR BLD AUTO: 10.7 % (ref 19.6–45.3)
LYMPHOCYTES NFR BLD AUTO: 10.8 % (ref 20–49)
LYMPHOCYTES NFR BLD AUTO: 10.8 % (ref 20–49)
LYMPHOCYTES NFR BLD AUTO: 11.8 % (ref 20–49)
LYMPHOCYTES NFR BLD AUTO: 12.2 % (ref 20–49)
LYMPHOCYTES NFR BLD AUTO: 12.3 % (ref 20–49)
LYMPHOCYTES NFR BLD AUTO: 12.5 % (ref 20–49)
LYMPHOCYTES NFR BLD AUTO: 13 % (ref 20–49)
LYMPHOCYTES NFR BLD AUTO: 13.2 % (ref 19.6–45.3)
LYMPHOCYTES NFR BLD AUTO: 13.3 % (ref 20–49)
LYMPHOCYTES NFR BLD AUTO: 13.4 % (ref 20–49)
LYMPHOCYTES NFR BLD AUTO: 15.8 % (ref 19.6–45.3)
LYMPHOCYTES NFR BLD AUTO: 16.5 % (ref 19.6–45.3)
LYMPHOCYTES NFR BLD AUTO: 17.2 % (ref 19.6–45.3)
LYMPHOCYTES NFR BLD AUTO: 17.9 % (ref 20–49)
LYMPHOCYTES NFR BLD AUTO: 18.2 % (ref 19.6–45.3)
LYMPHOCYTES NFR BLD AUTO: 19.4 % (ref 19.6–45.3)
LYMPHOCYTES NFR BLD AUTO: 20.3 % (ref 20–49)
LYMPHOCYTES NFR BLD AUTO: 21.2 % (ref 20–49)
LYMPHOCYTES NFR BLD AUTO: 23.5 % (ref 19.6–45.3)
LYMPHOCYTES NFR BLD AUTO: 27.9 % (ref 19.6–45.3)
LYMPHOCYTES NFR BLD AUTO: 31.5 % (ref 20–49)
LYMPHOCYTES NFR BLD AUTO: 32.4 % (ref 20–49)
LYMPHOCYTES NFR BLD AUTO: 35.9 % (ref 20–49)
LYMPHOCYTES NFR BLD AUTO: 36.8 % (ref 19.6–45.3)
LYMPHOCYTES NFR BLD AUTO: 43.2 % (ref 20–49)
LYMPHOCYTES NFR BLD AUTO: 48.8 % (ref 19.6–45.3)
LYMPHOCYTES NFR BLD AUTO: 6.3 % (ref 19.6–45.3)
LYMPHOCYTES NFR BLD AUTO: 6.4 % (ref 20–49)
LYMPHOCYTES NFR BLD AUTO: 7.8 % (ref 19.6–45.3)
LYMPHOCYTES NFR BLD AUTO: 8.4 % (ref 19.6–45.3)
LYMPHOCYTES NFR BLD MANUAL: 17 % (ref 19.6–45.3)
LYMPHOCYTES NFR BLD MANUAL: 22 % (ref 19.6–45.3)
LYMPHOCYTES NFR BLD MANUAL: 5 % (ref 5–12)
LYMPHOCYTES NFR BLD MANUAL: 8 % (ref 5–12)
Lab: NORMAL
MACROCYTES BLD QL SMEAR: ABNORMAL
MAGNESIUM SERPL-MCNC: 1.7 MG/DL (ref 1.6–2.6)
MAGNESIUM SERPL-MCNC: 1.8 MG/DL (ref 1.6–2.6)
MAGNESIUM SERPL-MCNC: 1.9 MG/DL (ref 1.6–2.6)
MAGNESIUM SERPL-MCNC: 2 MG/DL (ref 1.8–2.5)
MCH RBC QN AUTO: 27.6 PG (ref 27–33)
MCH RBC QN AUTO: 27.8 PG (ref 26.9–32)
MCH RBC QN AUTO: 28 PG (ref 26.9–32)
MCH RBC QN AUTO: 28.2 PG (ref 26.9–32)
MCH RBC QN AUTO: 28.3 PG (ref 27–33)
MCH RBC QN AUTO: 28.6 PG (ref 26.9–32)
MCH RBC QN AUTO: 28.6 PG (ref 27–33)
MCH RBC QN AUTO: 28.6 PG (ref 27–33)
MCH RBC QN AUTO: 28.8 PG (ref 26.9–32)
MCH RBC QN AUTO: 28.9 PG (ref 26.9–32)
MCH RBC QN AUTO: 28.9 PG (ref 26.9–32)
MCH RBC QN AUTO: 29.1 PG (ref 26.9–32)
MCH RBC QN AUTO: 29.2 PG (ref 26.9–32)
MCH RBC QN AUTO: 29.2 PG (ref 27–33)
MCH RBC QN AUTO: 29.3 PG (ref 26.9–32)
MCH RBC QN AUTO: 30.6 PG (ref 27–33)
MCH RBC QN AUTO: 31.1 PG (ref 27–33)
MCH RBC QN AUTO: 31.3 PG (ref 27–33)
MCH RBC QN AUTO: 32 PG (ref 27–33)
MCH RBC QN AUTO: 32.5 PG (ref 27–33)
MCH RBC QN AUTO: 32.8 PG (ref 26.9–32)
MCH RBC QN AUTO: 32.9 PG (ref 27–33)
MCH RBC QN AUTO: 33 PG (ref 27–33)
MCH RBC QN AUTO: 33.2 PG (ref 27–33)
MCH RBC QN AUTO: 33.2 PG (ref 27–33)
MCH RBC QN AUTO: 33.3 PG (ref 26.9–32)
MCH RBC QN AUTO: 33.5 PG (ref 26.9–32)
MCH RBC QN AUTO: 33.6 PG (ref 26.9–32)
MCH RBC QN AUTO: 33.6 PG (ref 26.9–32)
MCH RBC QN AUTO: 33.6 PG (ref 27–33)
MCH RBC QN AUTO: 33.6 PG (ref 27–33)
MCH RBC QN AUTO: 34 PG (ref 26.9–32)
MCH RBC QN AUTO: 34.1 PG (ref 26.9–32)
MCHC RBC AUTO-ENTMCNC: 30.8 G/DL (ref 32.4–36.3)
MCHC RBC AUTO-ENTMCNC: 31 G/DL (ref 32.4–36.3)
MCHC RBC AUTO-ENTMCNC: 31.2 G/DL (ref 32.4–36.3)
MCHC RBC AUTO-ENTMCNC: 31.3 G/DL (ref 32–35)
MCHC RBC AUTO-ENTMCNC: 31.3 G/DL (ref 32–35)
MCHC RBC AUTO-ENTMCNC: 31.6 G/DL (ref 32.4–36.3)
MCHC RBC AUTO-ENTMCNC: 31.6 G/DL (ref 32–35)
MCHC RBC AUTO-ENTMCNC: 31.6 G/DL (ref 32–35)
MCHC RBC AUTO-ENTMCNC: 31.7 G/DL (ref 32–35)
MCHC RBC AUTO-ENTMCNC: 31.8 G/DL (ref 32.4–36.3)
MCHC RBC AUTO-ENTMCNC: 31.8 G/DL (ref 32–35)
MCHC RBC AUTO-ENTMCNC: 31.9 G/DL (ref 32.4–36.3)
MCHC RBC AUTO-ENTMCNC: 31.9 G/DL (ref 32–35)
MCHC RBC AUTO-ENTMCNC: 32 G/DL (ref 32.4–36.3)
MCHC RBC AUTO-ENTMCNC: 32.1 G/DL (ref 32.4–36.3)
MCHC RBC AUTO-ENTMCNC: 32.2 G/DL (ref 32–35)
MCHC RBC AUTO-ENTMCNC: 32.3 G/DL (ref 32.4–36.3)
MCHC RBC AUTO-ENTMCNC: 32.3 G/DL (ref 32.4–36.3)
MCHC RBC AUTO-ENTMCNC: 33 G/DL (ref 32–35)
MCHC RBC AUTO-ENTMCNC: 33.6 G/DL (ref 32–35)
MCHC RBC AUTO-ENTMCNC: 33.8 G/DL (ref 32.4–36.3)
MCHC RBC AUTO-ENTMCNC: 34 G/DL (ref 32–35)
MCHC RBC AUTO-ENTMCNC: 34.2 G/DL (ref 32–35)
MCHC RBC AUTO-ENTMCNC: 34.2 G/DL (ref 32–35)
MCHC RBC AUTO-ENTMCNC: 34.3 G/DL (ref 32–35)
MCHC RBC AUTO-ENTMCNC: 34.5 G/DL (ref 32–35)
MCHC RBC AUTO-ENTMCNC: 34.6 G/DL (ref 32.4–36.3)
MCHC RBC AUTO-ENTMCNC: 34.8 G/DL (ref 32.4–36.3)
MCHC RBC AUTO-ENTMCNC: 34.8 G/DL (ref 32–35)
MCHC RBC AUTO-ENTMCNC: 34.9 G/DL (ref 32.4–36.3)
MCHC RBC AUTO-ENTMCNC: 34.9 G/DL (ref 32–35)
MCHC RBC AUTO-ENTMCNC: 35.1 G/DL (ref 32.4–36.3)
MCHC RBC AUTO-ENTMCNC: 35.3 G/DL (ref 32.4–36.3)
MCHC RBC AUTO-ENTMCNC: 35.4 G/DL (ref 32–35)
MCHC RBC AUTO-ENTMCNC: 36.3 G/DL (ref 32.4–36.3)
MCV RBC AUTO: 100 FL (ref 83–97)
MCV RBC AUTO: 100.6 FL (ref 83–97)
MCV RBC AUTO: 88.1 FL (ref 83–97)
MCV RBC AUTO: 89.1 FL (ref 83–97)
MCV RBC AUTO: 89.2 FL (ref 80.5–98.2)
MCV RBC AUTO: 89.2 FL (ref 83–97)
MCV RBC AUTO: 89.4 FL (ref 83–97)
MCV RBC AUTO: 89.5 FL (ref 83–97)
MCV RBC AUTO: 89.6 FL (ref 83–97)
MCV RBC AUTO: 89.8 FL (ref 83–97)
MCV RBC AUTO: 90.1 FL (ref 80.5–98.2)
MCV RBC AUTO: 90.2 FL (ref 83–97)
MCV RBC AUTO: 90.3 FL (ref 80.5–98.2)
MCV RBC AUTO: 90.4 FL (ref 80.5–98.2)
MCV RBC AUTO: 90.7 FL (ref 80.5–98.2)
MCV RBC AUTO: 90.7 FL (ref 80.5–98.2)
MCV RBC AUTO: 91.2 FL (ref 80.5–98.2)
MCV RBC AUTO: 91.4 FL (ref 80.5–98.2)
MCV RBC AUTO: 91.5 FL (ref 83–97)
MCV RBC AUTO: 93.4 FL (ref 83–97)
MCV RBC AUTO: 93.4 FL (ref 83–97)
MCV RBC AUTO: 93.8 FL (ref 80.5–98.2)
MCV RBC AUTO: 95 FL (ref 83–97)
MCV RBC AUTO: 95.1 FL (ref 80.5–98.2)
MCV RBC AUTO: 95.5 FL (ref 80.5–98.2)
MCV RBC AUTO: 95.6 FL (ref 83–97)
MCV RBC AUTO: 96.1 FL (ref 80.5–98.2)
MCV RBC AUTO: 96.3 FL (ref 83–97)
MCV RBC AUTO: 96.4 FL (ref 80.5–98.2)
MCV RBC AUTO: 96.9 FL (ref 80.5–98.2)
MCV RBC AUTO: 97.2 FL (ref 83–97)
MCV RBC AUTO: 97.7 FL (ref 80.5–98.2)
MCV RBC AUTO: 99.3 FL (ref 83–97)
MONOCYTES # BLD AUTO: 0.02 10*3/MM3 (ref 0.2–1.2)
MONOCYTES # BLD AUTO: 0.06 10*3/MM3 (ref 0.2–1.2)
MONOCYTES # BLD AUTO: 0.09 10*3/MM3 (ref 0.2–1.2)
MONOCYTES # BLD AUTO: 0.09 10*3/MM3 (ref 0.2–1.2)
MONOCYTES # BLD AUTO: 0.12 10*3/MM3 (ref 0.2–1.2)
MONOCYTES # BLD AUTO: 0.13 10*3/MM3 (ref 0.25–0.8)
MONOCYTES # BLD AUTO: 0.19 10*3/MM3 (ref 0.25–0.8)
MONOCYTES # BLD AUTO: 0.23 10*3/MM3 (ref 0.2–1.2)
MONOCYTES # BLD AUTO: 0.25 10*3/MM3 (ref 0.2–1.2)
MONOCYTES # BLD AUTO: 0.26 10*3/MM3 (ref 0.25–0.8)
MONOCYTES # BLD AUTO: 0.26 10*3/MM3 (ref 0.2–1.2)
MONOCYTES # BLD AUTO: 0.26 10*3/MM3 (ref 0.2–1.2)
MONOCYTES # BLD AUTO: 0.29 10*3/MM3 (ref 0.25–0.8)
MONOCYTES # BLD AUTO: 0.29 10*3/MM3 (ref 0.25–0.8)
MONOCYTES # BLD AUTO: 0.34 10*3/MM3 (ref 0.2–1.2)
MONOCYTES # BLD AUTO: 0.36 10*3/MM3 (ref 0.25–0.8)
MONOCYTES # BLD AUTO: 0.37 10*3/MM3 (ref 0.25–0.8)
MONOCYTES # BLD AUTO: 0.38 10*3/MM3 (ref 0.2–1.2)
MONOCYTES # BLD AUTO: 0.4 10*3/MM3 (ref 0.25–0.8)
MONOCYTES # BLD AUTO: 0.41 10*3/MM3 (ref 0.25–0.8)
MONOCYTES # BLD AUTO: 0.42 10*3/MM3 (ref 0.25–0.8)
MONOCYTES # BLD AUTO: 0.43 10*3/MM3 (ref 0.25–0.8)
MONOCYTES # BLD AUTO: 0.46 10*3/MM3 (ref 0.25–0.8)
MONOCYTES # BLD AUTO: 0.48 10*3/MM3 (ref 0.25–0.8)
MONOCYTES # BLD AUTO: 0.54 10*3/MM3 (ref 0.25–0.8)
MONOCYTES # BLD AUTO: 0.57 10*3/MM3 (ref 0.25–0.8)
MONOCYTES # BLD AUTO: 0.58 10*3/MM3 (ref 0.2–1.2)
MONOCYTES # BLD AUTO: 0.69 10*3/MM3 (ref 0.2–1.2)
MONOCYTES # BLD AUTO: 0.74 10*3/MM3 (ref 0.25–0.8)
MONOCYTES # BLD AUTO: 0.79 10*3/MM3 (ref 0.25–0.8)
MONOCYTES # BLD AUTO: 0.93 10*3/MM3 (ref 0.25–0.8)
MONOCYTES # BLD AUTO: 1.2 10*3/MM3 (ref 0.2–1.2)
MONOCYTES # BLD AUTO: 1.33 10*3/MM3 (ref 0.2–1.2)
MONOCYTES # BLD AUTO: 1.66 10*3/MM3 (ref 0.2–1.2)
MONOCYTES NFR BLD AUTO: 0.5 % (ref 5–12)
MONOCYTES NFR BLD AUTO: 0.8 % (ref 5–12)
MONOCYTES NFR BLD AUTO: 1.9 % (ref 5–12)
MONOCYTES NFR BLD AUTO: 10 % (ref 5–12)
MONOCYTES NFR BLD AUTO: 10.1 % (ref 4–12)
MONOCYTES NFR BLD AUTO: 10.6 % (ref 4–12)
MONOCYTES NFR BLD AUTO: 12 % (ref 4–12)
MONOCYTES NFR BLD AUTO: 12.3 % (ref 5–12)
MONOCYTES NFR BLD AUTO: 12.8 % (ref 4–12)
MONOCYTES NFR BLD AUTO: 13.2 % (ref 5–12)
MONOCYTES NFR BLD AUTO: 13.4 % (ref 4–12)
MONOCYTES NFR BLD AUTO: 13.6 % (ref 4–12)
MONOCYTES NFR BLD AUTO: 15.9 % (ref 5–12)
MONOCYTES NFR BLD AUTO: 17.6 % (ref 5–12)
MONOCYTES NFR BLD AUTO: 2.5 % (ref 4–12)
MONOCYTES NFR BLD AUTO: 3.1 % (ref 4–12)
MONOCYTES NFR BLD AUTO: 3.5 % (ref 5–12)
MONOCYTES NFR BLD AUTO: 3.8 % (ref 4–12)
MONOCYTES NFR BLD AUTO: 4.1 % (ref 5–12)
MONOCYTES NFR BLD AUTO: 4.3 % (ref 4–12)
MONOCYTES NFR BLD AUTO: 4.4 % (ref 5–12)
MONOCYTES NFR BLD AUTO: 4.9 % (ref 5–12)
MONOCYTES NFR BLD AUTO: 5 % (ref 5–12)
MONOCYTES NFR BLD AUTO: 5.4 % (ref 4–12)
MONOCYTES NFR BLD AUTO: 5.7 % (ref 4–12)
MONOCYTES NFR BLD AUTO: 6.1 % (ref 4–12)
MONOCYTES NFR BLD AUTO: 6.3 % (ref 4–12)
MONOCYTES NFR BLD AUTO: 6.9 % (ref 4–12)
MONOCYTES NFR BLD AUTO: 7.4 % (ref 5–12)
MONOCYTES NFR BLD AUTO: 9 % (ref 4–12)
MONOCYTES NFR BLD AUTO: 9.3 % (ref 4–12)
MONOCYTES NFR BLD AUTO: 9.4 % (ref 4–12)
NEUTROPHILS # BLD AUTO: 0.66 10*3/MM3 (ref 1.9–8.1)
NEUTROPHILS # BLD AUTO: 1.12 10*3/MM3 (ref 1.9–8.1)
NEUTROPHILS # BLD AUTO: 1.3 10*3/MM3 (ref 1.5–7)
NEUTROPHILS # BLD AUTO: 1.55 10*3/MM3 (ref 1.5–7)
NEUTROPHILS # BLD AUTO: 1.75 10*3/MM3 (ref 1.5–7)
NEUTROPHILS # BLD AUTO: 1.79 10*3/MM3 (ref 1.9–8.1)
NEUTROPHILS # BLD AUTO: 2.11 10*3/MM3 (ref 1.5–7)
NEUTROPHILS # BLD AUTO: 2.99 10*3/MM3 (ref 1.5–7)
NEUTROPHILS # BLD AUTO: 3.27 10*3/MM3 (ref 1.5–7)
NEUTROPHILS # BLD AUTO: 3.47 10*3/MM3 (ref 1.5–7)
NEUTROPHILS # BLD AUTO: 3.55 10*3/MM3 (ref 1.9–8.1)
NEUTROPHILS # BLD AUTO: 3.84 10*3/MM3 (ref 1.9–8.1)
NEUTROPHILS # BLD AUTO: 3.9 10*3/MM3 (ref 1.9–8.1)
NEUTROPHILS # BLD AUTO: 3.98 10*3/MM3 (ref 1.9–8.1)
NEUTROPHILS # BLD AUTO: 4.03 10*3/MM3 (ref 1.9–8.1)
NEUTROPHILS # BLD AUTO: 4.37 10*3/MM3 (ref 1.5–7)
NEUTROPHILS # BLD AUTO: 4.41 10*3/MM3 (ref 1.9–8.1)
NEUTROPHILS # BLD AUTO: 4.5 10*3/MM3 (ref 1.5–7)
NEUTROPHILS # BLD AUTO: 4.71 10*3/MM3 (ref 1.5–7)
NEUTROPHILS # BLD AUTO: 5.26 10*3/MM3 (ref 1.9–8.1)
NEUTROPHILS # BLD AUTO: 5.29 10*3/MM3 (ref 1.5–7)
NEUTROPHILS # BLD AUTO: 5.3 10*3/MM3 (ref 1.9–8.1)
NEUTROPHILS # BLD AUTO: 5.46 10*3/MM3 (ref 1.5–7)
NEUTROPHILS # BLD AUTO: 5.51 10*3/MM3 (ref 1.5–7)
NEUTROPHILS # BLD AUTO: 5.6 10*3/MM3 (ref 1.5–7)
NEUTROPHILS # BLD AUTO: 5.88 10*3/MM3 (ref 1.9–8.1)
NEUTROPHILS # BLD AUTO: 5.93 10*3/MM3 (ref 1.5–7)
NEUTROPHILS # BLD AUTO: 6.25 10*3/MM3 (ref 1.9–8.1)
NEUTROPHILS # BLD AUTO: 6.49 10*3/MM3 (ref 1.9–8.1)
NEUTROPHILS # BLD AUTO: 6.61 10*3/MM3 (ref 1.5–7)
NEUTROPHILS # BLD AUTO: 6.85 10*3/MM3 (ref 1.9–8.1)
NEUTROPHILS # BLD AUTO: 6.87 10*3/MM3 (ref 1.9–8.1)
NEUTROPHILS # BLD AUTO: 7.83 10*3/MM3 (ref 1.5–7)
NEUTROPHILS # BLD AUTO: 8.22 10*3/MM3 (ref 1.5–7)
NEUTROPHILS NFR BLD AUTO: 40.7 % (ref 42.7–76)
NEUTROPHILS NFR BLD AUTO: 43.1 % (ref 39–75)
NEUTROPHILS NFR BLD AUTO: 43.4 % (ref 39–75)
NEUTROPHILS NFR BLD AUTO: 53 % (ref 39–75)
NEUTROPHILS NFR BLD AUTO: 54.6 % (ref 39–75)
NEUTROPHILS NFR BLD AUTO: 54.9 % (ref 42.7–76)
NEUTROPHILS NFR BLD AUTO: 58.2 % (ref 42.7–76)
NEUTROPHILS NFR BLD AUTO: 63.8 % (ref 39–75)
NEUTROPHILS NFR BLD AUTO: 65.9 % (ref 39–75)
NEUTROPHILS NFR BLD AUTO: 68.6 % (ref 42.7–76)
NEUTROPHILS NFR BLD AUTO: 68.8 % (ref 42.7–76)
NEUTROPHILS NFR BLD AUTO: 70.5 % (ref 39–75)
NEUTROPHILS NFR BLD AUTO: 71.5 % (ref 42.7–76)
NEUTROPHILS NFR BLD AUTO: 72.9 % (ref 39–75)
NEUTROPHILS NFR BLD AUTO: 74.9 % (ref 42.7–76)
NEUTROPHILS NFR BLD AUTO: 75.4 % (ref 42.7–76)
NEUTROPHILS NFR BLD AUTO: 75.7 % (ref 42.7–76)
NEUTROPHILS NFR BLD AUTO: 77.2 % (ref 42.7–76)
NEUTROPHILS NFR BLD AUTO: 79 % (ref 42.7–76)
NEUTROPHILS NFR BLD AUTO: 79.6 % (ref 39–75)
NEUTROPHILS NFR BLD AUTO: 79.9 % (ref 39–75)
NEUTROPHILS NFR BLD AUTO: 80.4 % (ref 39–75)
NEUTROPHILS NFR BLD AUTO: 81.3 % (ref 39–75)
NEUTROPHILS NFR BLD AUTO: 81.7 % (ref 39–75)
NEUTROPHILS NFR BLD AUTO: 82.1 % (ref 39–75)
NEUTROPHILS NFR BLD AUTO: 82.1 % (ref 42.7–76)
NEUTROPHILS NFR BLD AUTO: 82.7 % (ref 39–75)
NEUTROPHILS NFR BLD AUTO: 83.5 % (ref 39–75)
NEUTROPHILS NFR BLD AUTO: 84.2 % (ref 39–75)
NEUTROPHILS NFR BLD AUTO: 85.3 % (ref 39–75)
NEUTROPHILS NFR BLD AUTO: 91.4 % (ref 42.7–76)
NEUTROPHILS NFR BLD AUTO: 93.2 % (ref 42.7–76)
NEUTROPHILS NFR BLD MANUAL: 68 % (ref 42.7–76)
NEUTROPHILS NFR BLD MANUAL: 78 % (ref 42.7–76)
NRBC BLD MANUAL-RTO: 0 /100 WBC (ref 0–0)
NRBC BLD MANUAL-RTO: 0.7 /100 WBC (ref 0–0)
NRBC SPEC MANUAL: 1 /100 WBC (ref 0–0)
NT-PROBNP SERPL-MCNC: 173 PG/ML (ref 0–900)
OVALOCYTES BLD QL SMEAR: ABNORMAL
PATH REPORT.ADDENDUM SPEC: NORMAL
PATH REPORT.FINAL DX SPEC: NORMAL
PATH REPORT.FINAL DX SPEC: NORMAL
PATH REPORT.GROSS SPEC: NORMAL
PATH REPORT.GROSS SPEC: NORMAL
PHOSPHATE SERPL-MCNC: 3.4 MG/DL (ref 2.5–4.5)
PLAT MORPH BLD: NORMAL
PLATELET # BLD AUTO: 136 10*3/MM3 (ref 150–375)
PLATELET # BLD AUTO: 161 10*3/MM3 (ref 150–375)
PLATELET # BLD AUTO: 162 10*3/MM3 (ref 150–375)
PLATELET # BLD AUTO: 188 10*3/MM3 (ref 150–375)
PLATELET # BLD AUTO: 226 10*3/MM3 (ref 140–500)
PLATELET # BLD AUTO: 228 10*3/MM3 (ref 150–375)
PLATELET # BLD AUTO: 232 10*3/MM3 (ref 140–500)
PLATELET # BLD AUTO: 235 10*3/MM3 (ref 140–500)
PLATELET # BLD AUTO: 244 10*3/MM3 (ref 140–500)
PLATELET # BLD AUTO: 257 10*3/MM3 (ref 150–375)
PLATELET # BLD AUTO: 272 10*3/MM3 (ref 140–500)
PLATELET # BLD AUTO: 277 10*3/MM3 (ref 140–500)
PLATELET # BLD AUTO: 294 10*3/MM3 (ref 140–500)
PLATELET # BLD AUTO: 296 10*3/MM3 (ref 150–375)
PLATELET # BLD AUTO: 310 10*3/MM3 (ref 150–375)
PLATELET # BLD AUTO: 312 10*3/MM3 (ref 150–375)
PLATELET # BLD AUTO: 326 10*3/MM3 (ref 140–500)
PLATELET # BLD AUTO: 326 10*3/MM3 (ref 140–500)
PLATELET # BLD AUTO: 331 10*3/MM3 (ref 140–500)
PLATELET # BLD AUTO: 331 10*3/MM3 (ref 150–375)
PLATELET # BLD AUTO: 340 10*3/MM3 (ref 150–375)
PLATELET # BLD AUTO: 342 10*3/MM3 (ref 150–375)
PLATELET # BLD AUTO: 345 10*3/MM3 (ref 150–375)
PLATELET # BLD AUTO: 354 10*3/MM3 (ref 140–500)
PLATELET # BLD AUTO: 378 10*3/MM3 (ref 140–500)
PLATELET # BLD AUTO: 388 10*3/MM3 (ref 150–375)
PLATELET # BLD AUTO: 389 10*3/MM3 (ref 140–500)
PLATELET # BLD AUTO: 397 10*3/MM3 (ref 150–375)
PLATELET # BLD AUTO: 398 10*3/MM3 (ref 150–375)
PLATELET # BLD AUTO: 430 10*3/MM3 (ref 140–500)
PLATELET # BLD AUTO: 446 10*3/MM3 (ref 150–375)
PLATELET # BLD AUTO: 553 10*3/MM3 (ref 140–500)
PLATELET # BLD AUTO: 593 10*3/MM3 (ref 140–500)
PLATELET # BLD AUTO: 596 10*3/MM3 (ref 150–375)
PLATELET # BLD AUTO: 621 10*3/MM3 (ref 140–500)
PMV BLD AUTO: 7.5 FL (ref 8.9–12.1)
PMV BLD AUTO: 7.6 FL (ref 8.9–12.1)
PMV BLD AUTO: 7.7 FL (ref 8.9–12.1)
PMV BLD AUTO: 7.8 FL (ref 8.9–12.1)
PMV BLD AUTO: 7.8 FL (ref 8.9–12.1)
PMV BLD AUTO: 7.9 FL (ref 6–12)
PMV BLD AUTO: 7.9 FL (ref 8.9–12.1)
PMV BLD AUTO: 8.1 FL (ref 8.9–12.1)
PMV BLD AUTO: 8.2 FL (ref 8.9–12.1)
PMV BLD AUTO: 8.2 FL (ref 8.9–12.1)
PMV BLD AUTO: 8.3 FL (ref 6–12)
PMV BLD AUTO: 8.3 FL (ref 6–12)
PMV BLD AUTO: 8.3 FL (ref 8.9–12.1)
PMV BLD AUTO: 8.3 FL (ref 8.9–12.1)
PMV BLD AUTO: 8.4 FL (ref 8.9–12.1)
PMV BLD AUTO: 8.5 FL (ref 6–12)
PMV BLD AUTO: 8.5 FL (ref 6–12)
PMV BLD AUTO: 8.6 FL (ref 6–12)
PMV BLD AUTO: 8.7 FL (ref 6–12)
PMV BLD AUTO: 8.9 FL (ref 6–12)
PMV BLD AUTO: 9 FL (ref 8.9–12.1)
PMV BLD AUTO: 9.1 FL (ref 6–12)
PMV BLD AUTO: 9.3 FL (ref 6–12)
PMV BLD AUTO: 9.3 FL (ref 6–12)
PMV BLD AUTO: 9.6 FL (ref 6–12)
POTASSIUM BLD-SCNC: 3.5 MMOL/L (ref 3.5–5.2)
POTASSIUM BLD-SCNC: 3.5 MMOL/L (ref 3.5–5.2)
POTASSIUM BLD-SCNC: 3.7 MMOL/L (ref 3.5–5.2)
POTASSIUM BLD-SCNC: 3.8 MMOL/L (ref 3.5–5.2)
POTASSIUM BLD-SCNC: 3.9 MMOL/L (ref 3.5–4.7)
POTASSIUM BLD-SCNC: 3.9 MMOL/L (ref 3.5–4.7)
POTASSIUM BLD-SCNC: 3.9 MMOL/L (ref 3.5–5.2)
POTASSIUM BLD-SCNC: 4 MMOL/L (ref 3.5–5.2)
POTASSIUM BLD-SCNC: 4.1 MMOL/L (ref 3.5–4.7)
POTASSIUM BLD-SCNC: 4.2 MMOL/L (ref 3.5–4.7)
POTASSIUM BLD-SCNC: 4.2 MMOL/L (ref 3.5–4.7)
POTASSIUM BLD-SCNC: 4.2 MMOL/L (ref 3.5–5.2)
POTASSIUM BLD-SCNC: 4.3 MMOL/L (ref 3.5–4.7)
POTASSIUM BLD-SCNC: 4.4 MMOL/L (ref 3.5–5.2)
POTASSIUM BLD-SCNC: 4.4 MMOL/L (ref 3.5–5.2)
POTASSIUM BLD-SCNC: 4.5 MMOL/L (ref 3.5–5.2)
POTASSIUM BLD-SCNC: 4.7 MMOL/L (ref 3.5–4.7)
POTASSIUM BLD-SCNC: 4.8 MMOL/L (ref 3.5–5.2)
POTASSIUM BLD-SCNC: 4.8 MMOL/L (ref 3.5–5.2)
POTASSIUM BLD-SCNC: 5 MMOL/L (ref 3.5–4.7)
POTASSIUM BLD-SCNC: 5 MMOL/L (ref 3.5–5.2)
PROT SERPL-MCNC: 4.6 G/DL (ref 6–8.5)
PROT SERPL-MCNC: 4.8 G/DL (ref 6–8.5)
PROT SERPL-MCNC: 5.2 G/DL (ref 6–8.5)
PROT SERPL-MCNC: 5.4 G/DL (ref 6.3–8)
PROT SERPL-MCNC: 5.5 G/DL (ref 6–8.5)
PROT SERPL-MCNC: 5.5 G/DL (ref 6–8.5)
PROT SERPL-MCNC: 5.6 G/DL (ref 6.3–8)
PROT SERPL-MCNC: 5.7 G/DL (ref 6.3–8)
PROT SERPL-MCNC: 5.7 G/DL (ref 6.3–8)
PROT SERPL-MCNC: 5.8 G/DL (ref 6.3–8)
PROT SERPL-MCNC: 5.8 G/DL (ref 6.3–8)
PROT SERPL-MCNC: 5.8 G/DL (ref 6–8.5)
PROT SERPL-MCNC: 5.8 G/DL (ref 6–8.5)
PROT SERPL-MCNC: 5.9 G/DL (ref 6.3–8)
PROT SERPL-MCNC: 6 G/DL (ref 6.3–8)
PROT SERPL-MCNC: 6.1 G/DL (ref 6.3–8)
PROT SERPL-MCNC: 6.1 G/DL (ref 6.3–8)
PROT SERPL-MCNC: 6.2 G/DL (ref 6.3–8)
PROT SERPL-MCNC: 6.6 G/DL (ref 6.3–8)
PROT SERPL-MCNC: 6.7 G/DL (ref 6.3–8)
PROTHROMBIN TIME: 13.5 SECONDS (ref 11.7–14.2)
PROTHROMBIN TIME: 13.9 SECONDS (ref 11.7–14.2)
PROTHROMBIN TIME: 14 SECONDS (ref 11.7–14.2)
PROTHROMBIN TIME: 15.9 SECONDS (ref 11.7–14.2)
RBC # BLD AUTO: 2.72 10*6/MM3 (ref 3.9–5)
RBC # BLD AUTO: 2.93 10*6/MM3 (ref 3.9–5.2)
RBC # BLD AUTO: 2.97 10*6/MM3 (ref 3.9–5)
RBC # BLD AUTO: 3.03 10*6/MM3 (ref 3.9–5.2)
RBC # BLD AUTO: 3.04 10*6/MM3 (ref 3.9–5.2)
RBC # BLD AUTO: 3.07 10*6/MM3 (ref 3.9–5.2)
RBC # BLD AUTO: 3.09 10*6/MM3 (ref 3.9–5.2)
RBC # BLD AUTO: 3.1 10*6/MM3 (ref 3.9–5)
RBC # BLD AUTO: 3.1 10*6/MM3 (ref 3.9–5.2)
RBC # BLD AUTO: 3.2 10*6/MM3 (ref 3.9–5.2)
RBC # BLD AUTO: 3.25 10*6/MM3 (ref 3.9–5)
RBC # BLD AUTO: 3.25 10*6/MM3 (ref 3.9–5)
RBC # BLD AUTO: 3.32 10*6/MM3 (ref 3.9–5)
RBC # BLD AUTO: 3.36 10*6/MM3 (ref 3.9–5)
RBC # BLD AUTO: 3.39 10*6/MM3 (ref 3.9–5)
RBC # BLD AUTO: 3.5 10*6/MM3 (ref 3.9–5)
RBC # BLD AUTO: 3.53 10*6/MM3 (ref 3.9–5.2)
RBC # BLD AUTO: 3.54 10*6/MM3 (ref 3.9–5.2)
RBC # BLD AUTO: 3.54 10*6/MM3 (ref 3.9–5.2)
RBC # BLD AUTO: 3.6 10*6/MM3 (ref 3.9–5)
RBC # BLD AUTO: 3.63 10*6/MM3 (ref 3.9–5.2)
RBC # BLD AUTO: 3.64 10*6/MM3 (ref 3.9–5)
RBC # BLD AUTO: 3.65 10*6/MM3 (ref 3.9–5.2)
RBC # BLD AUTO: 3.67 10*6/MM3 (ref 3.9–5)
RBC # BLD AUTO: 3.7 10*6/MM3 (ref 3.9–5)
RBC # BLD AUTO: 3.76 10*6/MM3 (ref 3.9–5)
RBC # BLD AUTO: 3.77 10*6/MM3 (ref 3.9–5)
RBC # BLD AUTO: 3.81 10*6/MM3 (ref 3.9–5.2)
RBC # BLD AUTO: 3.86 10*6/MM3 (ref 3.9–5)
RBC # BLD AUTO: 3.86 10*6/MM3 (ref 3.9–5.2)
RBC # BLD AUTO: 3.88 10*6/MM3 (ref 3.9–5.2)
RBC # BLD AUTO: 3.89 10*6/MM3 (ref 3.9–5)
RBC # BLD AUTO: 3.96 10*6/MM3 (ref 3.9–5.2)
RBC # BLD AUTO: 3.99 10*6/MM3 (ref 3.9–5)
RBC # BLD AUTO: 4.32 10*6/MM3 (ref 3.9–5.2)
RH BLD: POSITIVE
SCAN SLIDE: NORMAL
SCAN SLIDE: NORMAL
SODIUM BLD-SCNC: 121 MMOL/L (ref 136–145)
SODIUM BLD-SCNC: 123 MMOL/L (ref 134–145)
SODIUM BLD-SCNC: 129 MMOL/L (ref 136–145)
SODIUM BLD-SCNC: 130 MMOL/L (ref 136–145)
SODIUM BLD-SCNC: 132 MMOL/L (ref 134–145)
SODIUM BLD-SCNC: 132 MMOL/L (ref 136–145)
SODIUM BLD-SCNC: 133 MMOL/L (ref 136–145)
SODIUM BLD-SCNC: 134 MMOL/L (ref 134–145)
SODIUM BLD-SCNC: 134 MMOL/L (ref 136–145)
SODIUM BLD-SCNC: 135 MMOL/L (ref 134–145)
SODIUM BLD-SCNC: 135 MMOL/L (ref 134–145)
SODIUM BLD-SCNC: 135 MMOL/L (ref 136–145)
SODIUM BLD-SCNC: 136 MMOL/L (ref 134–145)
SODIUM BLD-SCNC: 136 MMOL/L (ref 136–145)
SODIUM BLD-SCNC: 136 MMOL/L (ref 136–145)
SODIUM BLD-SCNC: 137 MMOL/L (ref 134–145)
SODIUM BLD-SCNC: 137 MMOL/L (ref 136–145)
SODIUM BLD-SCNC: 137 MMOL/L (ref 136–145)
SODIUM BLD-SCNC: 139 MMOL/L (ref 134–145)
SODIUM BLD-SCNC: 139 MMOL/L (ref 134–145)
SODIUM BLD-SCNC: 140 MMOL/L (ref 134–145)
SODIUM BLD-SCNC: 140 MMOL/L (ref 136–145)
SPHEROCYTES BLD QL SMEAR: ABNORMAL
SPHEROCYTES BLD QL SMEAR: ABNORMAL
T4 FREE SERPL-MCNC: 1.17 NG/DL (ref 0.93–1.7)
TRIGL SERPL-MCNC: 95 MG/DL (ref 0–150)
TROPONIN T SERPL-MCNC: <0.01 NG/ML (ref 0–0.03)
TROPONIN T SERPL-MCNC: <0.01 NG/ML (ref 0–0.03)
TSH SERPL DL<=0.05 MIU/L-ACNC: 0.44 MIU/ML (ref 0.27–4.2)
UNIT  ABO: NORMAL
UNIT  ABO: NORMAL
UNIT  RH: NORMAL
UNIT  RH: NORMAL
URATE SERPL-MCNC: 4.7 MG/DL (ref 2.8–7.4)
VARIANT LYMPHS NFR BLD MANUAL: 1 % (ref 0–5)
VLDLC SERPL-MCNC: 19 MG/DL (ref 5–40)
WBC MORPH BLD: NORMAL
WBC MORPH BLD: NORMAL
WBC NRBC COR # BLD: 1.62 10*3/MM3 (ref 4.5–10.7)
WBC NRBC COR # BLD: 2.04 10*3/MM3 (ref 4.5–10.7)
WBC NRBC COR # BLD: 3.01 10*3/MM3 (ref 4–10)
WBC NRBC COR # BLD: 3.08 10*3/MM3 (ref 4.5–10.7)
WBC NRBC COR # BLD: 3.21 10*3/MM3 (ref 4–10)
WBC NRBC COR # BLD: 3.57 10*3/MM3 (ref 4–10)
WBC NRBC COR # BLD: 3.81 10*3/MM3 (ref 4.5–10.7)
WBC NRBC COR # BLD: 3.97 10*3/MM3 (ref 4–10)
WBC NRBC COR # BLD: 4.54 10*3/MM3 (ref 4–10)
WBC NRBC COR # BLD: 4.64 10*3/MM3 (ref 4–10)
WBC NRBC COR # BLD: 4.68 10*3/MM3 (ref 4.5–10.7)
WBC NRBC COR # BLD: 5.05 10*3/MM3 (ref 4.5–10.7)
WBC NRBC COR # BLD: 5.19 10*3/MM3 (ref 4–10)
WBC NRBC COR # BLD: 5.34 10*3/MM3 (ref 4.5–10.7)
WBC NRBC COR # BLD: 5.43 10*3/MM3 (ref 4–10)
WBC NRBC COR # BLD: 5.58 10*3/MM3 (ref 4.5–10.7)
WBC NRBC COR # BLD: 5.81 10*3/MM3 (ref 4.5–10.7)
WBC NRBC COR # BLD: 5.92 10*3/MM3 (ref 4–10)
WBC NRBC COR # BLD: 6.17 10*3/MM3 (ref 4–10)
WBC NRBC COR # BLD: 6.2 10*3/MM3 (ref 4–10)
WBC NRBC COR # BLD: 6.69 10*3/MM3 (ref 4–10)
WBC NRBC COR # BLD: 6.78 10*3/MM3 (ref 4–10)
WBC NRBC COR # BLD: 6.8 10*3/MM3 (ref 4.5–10.7)
WBC NRBC COR # BLD: 6.82 10*3/MM3 (ref 4–10)
WBC NRBC COR # BLD: 7.05 10*3/MM3 (ref 4.5–10.7)
WBC NRBC COR # BLD: 7.36 10*3/MM3 (ref 4.5–10.7)
WBC NRBC COR # BLD: 7.42 10*3/MM3 (ref 4–10)
WBC NRBC COR # BLD: 7.52 10*3/MM3 (ref 4.5–10.7)
WBC NRBC COR # BLD: 8.23 10*3/MM3 (ref 4–10)
WBC NRBC COR # BLD: 8.35 10*3/MM3 (ref 4.5–10.7)
WBC NRBC COR # BLD: 8.64 10*3/MM3 (ref 4.5–10.7)
WBC NRBC COR # BLD: 9.06 10*3/MM3 (ref 4.5–10.7)
WBC NRBC COR # BLD: 9.44 10*3/MM3 (ref 4.5–10.7)
WBC NRBC COR # BLD: 9.47 10*3/MM3 (ref 4–10)
WBC NRBC COR # BLD: 9.85 10*3/MM3 (ref 4–10)

## 2017-01-01 PROCEDURE — 85025 COMPLETE CBC W/AUTO DIFF WBC: CPT

## 2017-01-01 PROCEDURE — 70553 MRI BRAIN STEM W/O & W/DYE: CPT

## 2017-01-01 PROCEDURE — 85025 COMPLETE CBC W/AUTO DIFF WBC: CPT | Performed by: INTERNAL MEDICINE

## 2017-01-01 PROCEDURE — 25010000002 HEPARIN FLUSH (PORCINE) 100 UNIT/ML SOLUTION: Performed by: INTERNAL MEDICINE

## 2017-01-01 PROCEDURE — 80048 BASIC METABOLIC PNL TOTAL CA: CPT | Performed by: INTERNAL MEDICINE

## 2017-01-01 PROCEDURE — 25010000002 HYDROMORPHONE PER 4 MG: Performed by: INTERNAL MEDICINE

## 2017-01-01 PROCEDURE — 96375 TX/PRO/DX INJ NEW DRUG ADDON: CPT | Performed by: INTERNAL MEDICINE

## 2017-01-01 PROCEDURE — 96413 CHEMO IV INFUSION 1 HR: CPT | Performed by: INTERNAL MEDICINE

## 2017-01-01 PROCEDURE — 77334 RADIATION TREATMENT AID(S): CPT | Performed by: RADIOLOGY

## 2017-01-01 PROCEDURE — 80053 COMPREHEN METABOLIC PANEL: CPT

## 2017-01-01 PROCEDURE — 71020 HC CHEST PA AND LATERAL: CPT

## 2017-01-01 PROCEDURE — 97165 OT EVAL LOW COMPLEX 30 MIN: CPT

## 2017-01-01 PROCEDURE — 82565 ASSAY OF CREATININE: CPT

## 2017-01-01 PROCEDURE — 36415 COLL VENOUS BLD VENIPUNCTURE: CPT

## 2017-01-01 PROCEDURE — 36591 DRAW BLOOD OFF VENOUS DEVICE: CPT

## 2017-01-01 PROCEDURE — 99233 SBSQ HOSP IP/OBS HIGH 50: CPT | Performed by: INTERNAL MEDICINE

## 2017-01-01 PROCEDURE — 25010000002 PROMETHAZINE PER 50 MG: Performed by: INTERNAL MEDICINE

## 2017-01-01 PROCEDURE — 99213 OFFICE O/P EST LOW 20 MIN: CPT | Performed by: NURSE PRACTITIONER

## 2017-01-01 PROCEDURE — 71010 HC CHEST PA OR AP: CPT

## 2017-01-01 PROCEDURE — 80053 COMPREHEN METABOLIC PANEL: CPT | Performed by: INTERNAL MEDICINE

## 2017-01-01 PROCEDURE — 93321 DOPPLER ECHO F-UP/LMTD STD: CPT | Performed by: INTERNAL MEDICINE

## 2017-01-01 PROCEDURE — 97110 THERAPEUTIC EXERCISES: CPT

## 2017-01-01 PROCEDURE — 99239 HOSP IP/OBS DSCHRG MGMT >30: CPT | Performed by: INTERNAL MEDICINE

## 2017-01-01 PROCEDURE — 93010 ELECTROCARDIOGRAM REPORT: CPT | Performed by: INTERNAL MEDICINE

## 2017-01-01 PROCEDURE — 36593 DECLOT VASCULAR DEVICE: CPT | Performed by: NURSE PRACTITIONER

## 2017-01-01 PROCEDURE — 25010000002 PALONOSETRON PER 25 MCG: Performed by: INTERNAL MEDICINE

## 2017-01-01 PROCEDURE — 25010000002 DEXAMETHASONE PER 1 MG: Performed by: INTERNAL MEDICINE

## 2017-01-01 PROCEDURE — G8980 MOBILITY D/C STATUS: HCPCS

## 2017-01-01 PROCEDURE — 0W993ZZ DRAINAGE OF RIGHT PLEURAL CAVITY, PERCUTANEOUS APPROACH: ICD-10-PCS | Performed by: RADIOLOGY

## 2017-01-01 PROCEDURE — 99215 OFFICE O/P EST HI 40 MIN: CPT | Performed by: INTERNAL MEDICINE

## 2017-01-01 PROCEDURE — 25010000002 GEMCITABINE 200 MG/5.26ML SOLUTION 5.26 ML VIAL: Performed by: INTERNAL MEDICINE

## 2017-01-01 PROCEDURE — 93308 TTE F-UP OR LMTD: CPT | Performed by: INTERNAL MEDICINE

## 2017-01-01 PROCEDURE — 80048 BASIC METABOLIC PNL TOTAL CA: CPT | Performed by: NURSE PRACTITIONER

## 2017-01-01 PROCEDURE — G8978 MOBILITY CURRENT STATUS: HCPCS

## 2017-01-01 PROCEDURE — 93308 TTE F-UP OR LMTD: CPT

## 2017-01-01 PROCEDURE — 84443 ASSAY THYROID STIM HORMONE: CPT | Performed by: NURSE PRACTITIONER

## 2017-01-01 PROCEDURE — 93321 DOPPLER ECHO F-UP/LMTD STD: CPT

## 2017-01-01 PROCEDURE — 0 IOPAMIDOL 61 % SOLUTION: Performed by: INTERNAL MEDICINE

## 2017-01-01 PROCEDURE — 97140 MANUAL THERAPY 1/> REGIONS: CPT

## 2017-01-01 PROCEDURE — 96375 TX/PRO/DX INJ NEW DRUG ADDON: CPT | Performed by: NURSE PRACTITIONER

## 2017-01-01 PROCEDURE — 76942 ECHO GUIDE FOR BIOPSY: CPT

## 2017-01-01 PROCEDURE — 93325 DOPPLER ECHO COLOR FLOW MAPG: CPT

## 2017-01-01 PROCEDURE — 0 TECHNETIUM MEDRONATE KIT: Performed by: INTERNAL MEDICINE

## 2017-01-01 PROCEDURE — 25010000002 DEXAMETHASONE PER 1 MG: Performed by: NURSE PRACTITIONER

## 2017-01-01 PROCEDURE — 25010000002 HYDROMORPHONE PER 4 MG: Performed by: NURSE ANESTHETIST, CERTIFIED REGISTERED

## 2017-01-01 PROCEDURE — 0W9B3ZZ DRAINAGE OF LEFT PLEURAL CAVITY, PERCUTANEOUS APPROACH: ICD-10-PCS | Performed by: INTERNAL MEDICINE

## 2017-01-01 PROCEDURE — 77263 THER RADIOLOGY TX PLNG CPLX: CPT | Performed by: RADIOLOGY

## 2017-01-01 PROCEDURE — 25010000002 TRASTUZUMAB PER 10 MG: Performed by: NURSE PRACTITIONER

## 2017-01-01 PROCEDURE — 36415 COLL VENOUS BLD VENIPUNCTURE: CPT | Performed by: INTERNAL MEDICINE

## 2017-01-01 PROCEDURE — 93306 TTE W/DOPPLER COMPLETE: CPT

## 2017-01-01 PROCEDURE — A9577 INJ MULTIHANCE: HCPCS | Performed by: RADIOLOGY

## 2017-01-01 PROCEDURE — 77280 THER RAD SIMULAJ FIELD SMPL: CPT

## 2017-01-01 PROCEDURE — 84550 ASSAY OF BLOOD/URIC ACID: CPT

## 2017-01-01 PROCEDURE — 99214 OFFICE O/P EST MOD 30 MIN: CPT | Performed by: NURSE PRACTITIONER

## 2017-01-01 PROCEDURE — 99214 OFFICE O/P EST MOD 30 MIN: CPT | Performed by: INTERNAL MEDICINE

## 2017-01-01 PROCEDURE — 99232 SBSQ HOSP IP/OBS MODERATE 35: CPT | Performed by: INTERNAL MEDICINE

## 2017-01-01 PROCEDURE — 63710000001 PREDNISONE PER 5 MG: Performed by: INTERNAL MEDICINE

## 2017-01-01 PROCEDURE — 0 DIATRIZOATE MEGLUMINE & SODIUM PER 1 ML: Performed by: INTERNAL MEDICINE

## 2017-01-01 PROCEDURE — 96413 CHEMO IV INFUSION 1 HR: CPT | Performed by: NURSE PRACTITIONER

## 2017-01-01 PROCEDURE — 25010000002 TRASTUZUMAB PER 10 MG: Performed by: INTERNAL MEDICINE

## 2017-01-01 PROCEDURE — 25010000002 PROPOFOL 10 MG/ML EMULSION: Performed by: NURSE ANESTHETIST, CERTIFIED REGISTERED

## 2017-01-01 PROCEDURE — 25010000002 PACLITAXEL PROTEIN-BOUND PART PER 1 MG: Performed by: INTERNAL MEDICINE

## 2017-01-01 PROCEDURE — 99252 IP/OBS CONSLTJ NEW/EST SF 35: CPT | Performed by: RADIOLOGY

## 2017-01-01 PROCEDURE — 78306 BONE IMAGING WHOLE BODY: CPT

## 2017-01-01 PROCEDURE — 96523 IRRIG DRUG DELIVERY DEVICE: CPT

## 2017-01-01 PROCEDURE — 77290 THER RAD SIMULAJ FIELD CPLX: CPT | Performed by: RADIOLOGY

## 2017-01-01 PROCEDURE — 88342 IMHCHEM/IMCYTCHM 1ST ANTB: CPT | Performed by: INTERNAL MEDICINE

## 2017-01-01 PROCEDURE — 25010000002 GRANISETRON PER 100 MCG: Performed by: NURSE PRACTITIONER

## 2017-01-01 PROCEDURE — 93005 ELECTROCARDIOGRAM TRACING: CPT | Performed by: NURSE PRACTITIONER

## 2017-01-01 PROCEDURE — 74177 CT ABD & PELVIS W/CONTRAST: CPT

## 2017-01-01 PROCEDURE — 88305 TISSUE EXAM BY PATHOLOGIST: CPT | Performed by: INTERNAL MEDICINE

## 2017-01-01 PROCEDURE — 25010000002 ONDANSETRON PER 1 MG: Performed by: NURSE ANESTHETIST, CERTIFIED REGISTERED

## 2017-01-01 PROCEDURE — 85730 THROMBOPLASTIN TIME PARTIAL: CPT | Performed by: INTERNAL MEDICINE

## 2017-01-01 PROCEDURE — 85610 PROTHROMBIN TIME: CPT | Performed by: INTERNAL MEDICINE

## 2017-01-01 PROCEDURE — 84100 ASSAY OF PHOSPHORUS: CPT

## 2017-01-01 PROCEDURE — 25010000002 ALTEPLASE: Performed by: INTERNAL MEDICINE

## 2017-01-01 PROCEDURE — 93325 DOPPLER ECHO COLOR FLOW MAPG: CPT | Performed by: INTERNAL MEDICINE

## 2017-01-01 PROCEDURE — 25010000002 ENOXAPARIN PER 10 MG: Performed by: INTERNAL MEDICINE

## 2017-01-01 PROCEDURE — 97161 PT EVAL LOW COMPLEX 20 MIN: CPT

## 2017-01-01 PROCEDURE — 77372 SRS LINEAR BASED: CPT | Performed by: RADIOLOGY

## 2017-01-01 PROCEDURE — 77470 SPECIAL RADIATION TREATMENT: CPT | Performed by: RADIOLOGY

## 2017-01-01 PROCEDURE — 96417 CHEMO IV INFUS EACH ADDL SEQ: CPT | Performed by: INTERNAL MEDICINE

## 2017-01-01 PROCEDURE — 25010000002 VINORELBINE TARTRATE PER 10 MG: Performed by: INTERNAL MEDICINE

## 2017-01-01 PROCEDURE — 88112 CYTOPATH CELL ENHANCE TECH: CPT | Performed by: INTERNAL MEDICINE

## 2017-01-01 PROCEDURE — 71260 CT THORAX DX C+: CPT

## 2017-01-01 PROCEDURE — 77370 RADIATION PHYSICS CONSULT: CPT | Performed by: RADIOLOGY

## 2017-01-01 PROCEDURE — 99231 SBSQ HOSP IP/OBS SF/LOW 25: CPT | Performed by: NURSE PRACTITIONER

## 2017-01-01 PROCEDURE — A9577 INJ MULTIHANCE: HCPCS | Performed by: INTERNAL MEDICINE

## 2017-01-01 PROCEDURE — 75989 ABSCESS DRAINAGE UNDER X-RAY: CPT | Performed by: THORACIC SURGERY (CARDIOTHORACIC VASCULAR SURGERY)

## 2017-01-01 PROCEDURE — 93306 TTE W/DOPPLER COMPLETE: CPT | Performed by: INTERNAL MEDICINE

## 2017-01-01 PROCEDURE — 86900 BLOOD TYPING SEROLOGIC ABO: CPT

## 2017-01-01 PROCEDURE — 99223 1ST HOSP IP/OBS HIGH 75: CPT | Performed by: NURSE PRACTITIONER

## 2017-01-01 PROCEDURE — 77432 STEREOTACTIC RADIATION TRMT: CPT | Performed by: RADIOLOGY

## 2017-01-01 PROCEDURE — 80053 COMPREHEN METABOLIC PANEL: CPT | Performed by: NURSE PRACTITIONER

## 2017-01-01 PROCEDURE — 85007 BL SMEAR W/DIFF WBC COUNT: CPT | Performed by: INTERNAL MEDICINE

## 2017-01-01 PROCEDURE — 99232 SBSQ HOSP IP/OBS MODERATE 35: CPT | Performed by: NURSE PRACTITIONER

## 2017-01-01 PROCEDURE — G8979 MOBILITY GOAL STATUS: HCPCS

## 2017-01-01 PROCEDURE — P9016 RBC LEUKOCYTES REDUCED: HCPCS

## 2017-01-01 PROCEDURE — 70491 CT SOFT TISSUE NECK W/DYE: CPT

## 2017-01-01 PROCEDURE — C1729 CATH, DRAINAGE: HCPCS | Performed by: THORACIC SURGERY (CARDIOTHORACIC VASCULAR SURGERY)

## 2017-01-01 PROCEDURE — 84484 ASSAY OF TROPONIN QUANT: CPT | Performed by: INTERNAL MEDICINE

## 2017-01-01 PROCEDURE — 76000 FLUOROSCOPY <1 HR PHYS/QHP: CPT

## 2017-01-01 PROCEDURE — 25010000002 DIAZEPAM PER 5 MG: Performed by: INTERNAL MEDICINE

## 2017-01-01 PROCEDURE — 83735 ASSAY OF MAGNESIUM: CPT | Performed by: INTERNAL MEDICINE

## 2017-01-01 PROCEDURE — 93005 ELECTROCARDIOGRAM TRACING: CPT

## 2017-01-01 PROCEDURE — 77300 RADIATION THERAPY DOSE PLAN: CPT | Performed by: RADIOLOGY

## 2017-01-01 PROCEDURE — 80076 HEPATIC FUNCTION PANEL: CPT | Performed by: INTERNAL MEDICINE

## 2017-01-01 PROCEDURE — 85025 COMPLETE CBC W/AUTO DIFF WBC: CPT | Performed by: NURSE PRACTITIONER

## 2017-01-01 PROCEDURE — 36415 COLL VENOUS BLD VENIPUNCTURE: CPT | Performed by: NURSE PRACTITIONER

## 2017-01-01 PROCEDURE — 86850 RBC ANTIBODY SCREEN: CPT

## 2017-01-01 PROCEDURE — 93971 EXTREMITY STUDY: CPT

## 2017-01-01 PROCEDURE — 97535 SELF CARE MNGMENT TRAINING: CPT

## 2017-01-01 PROCEDURE — 25010000002 TBO-FILGRASTIM 480 MCG/0.8ML SOLUTION PREFILLED SYRINGE: Performed by: INTERNAL MEDICINE

## 2017-01-01 PROCEDURE — 63710000001 DIPHENHYDRAMINE PER 50 MG: Performed by: INTERNAL MEDICINE

## 2017-01-01 PROCEDURE — 84439 ASSAY OF FREE THYROXINE: CPT | Performed by: NURSE PRACTITIONER

## 2017-01-01 PROCEDURE — 63710000001 PROCHLORPERAZINE MALEATE PER 10 MG: Performed by: INTERNAL MEDICINE

## 2017-01-01 PROCEDURE — 77295 3-D RADIOTHERAPY PLAN: CPT | Performed by: RADIOLOGY

## 2017-01-01 PROCEDURE — 83615 LACTATE (LD) (LDH) ENZYME: CPT

## 2017-01-01 PROCEDURE — 25010000002 DEXAMETHASONE PER 1 MG: Performed by: NURSE ANESTHETIST, CERTIFIED REGISTERED

## 2017-01-01 PROCEDURE — 76882 US LMTD JT/FCL EVL NVASC XTR: CPT

## 2017-01-01 PROCEDURE — 32550 INSERT PLEURAL CATH: CPT | Performed by: THORACIC SURGERY (CARDIOTHORACIC VASCULAR SURGERY)

## 2017-01-01 PROCEDURE — 25010000002 PHENYLEPHRINE PER 1 ML: Performed by: NURSE ANESTHETIST, CERTIFIED REGISTERED

## 2017-01-01 PROCEDURE — G0463 HOSPITAL OUTPT CLINIC VISIT: HCPCS | Performed by: NURSE PRACTITIONER

## 2017-01-01 PROCEDURE — 96411 CHEMO IV PUSH ADDL DRUG: CPT | Performed by: INTERNAL MEDICINE

## 2017-01-01 PROCEDURE — 36430 TRANSFUSION BLD/BLD COMPNT: CPT

## 2017-01-01 PROCEDURE — 96360 HYDRATION IV INFUSION INIT: CPT | Performed by: NURSE PRACTITIONER

## 2017-01-01 PROCEDURE — 94799 UNLISTED PULMONARY SVC/PX: CPT

## 2017-01-01 PROCEDURE — 96409 CHEMO IV PUSH SNGL DRUG: CPT | Performed by: INTERNAL MEDICINE

## 2017-01-01 PROCEDURE — 25010000002 PACLITAXEL PROTEIN-BOUND PART PER 1 MG: Performed by: NURSE PRACTITIONER

## 2017-01-01 PROCEDURE — 25010000002 MIDAZOLAM PER 1 MG: Performed by: ANESTHESIOLOGY

## 2017-01-01 PROCEDURE — 96523 IRRIG DRUG DELIVERY DEVICE: CPT | Performed by: NURSE PRACTITIONER

## 2017-01-01 PROCEDURE — 83880 ASSAY OF NATRIURETIC PEPTIDE: CPT | Performed by: NURSE PRACTITIONER

## 2017-01-01 PROCEDURE — 93005 ELECTROCARDIOGRAM TRACING: CPT | Performed by: INTERNAL MEDICINE

## 2017-01-01 PROCEDURE — 25010000002 HEPARIN FLUSH (PORCINE) 100 UNIT/ML SOLUTION: Performed by: NURSE PRACTITIONER

## 2017-01-01 PROCEDURE — 94640 AIRWAY INHALATION TREATMENT: CPT

## 2017-01-01 PROCEDURE — 86901 BLOOD TYPING SEROLOGIC RH(D): CPT

## 2017-01-01 PROCEDURE — 25010000002 ALTEPLASE 2 MG RECONSTITUTED SOLUTION: Performed by: INTERNAL MEDICINE

## 2017-01-01 PROCEDURE — C8929 TTE W OR WO FOL WCON,DOPPLER: HCPCS

## 2017-01-01 PROCEDURE — 85027 COMPLETE CBC AUTOMATED: CPT | Performed by: INTERNAL MEDICINE

## 2017-01-01 PROCEDURE — A9503 TC99M MEDRONATE: HCPCS | Performed by: INTERNAL MEDICINE

## 2017-01-01 PROCEDURE — 77336 RADIATION PHYSICS CONSULT: CPT | Performed by: RADIOLOGY

## 2017-01-01 PROCEDURE — 25010000002 LORAZEPAM PER 2 MG: Performed by: INTERNAL MEDICINE

## 2017-01-01 PROCEDURE — 99215 OFFICE O/P EST HI 40 MIN: CPT | Performed by: NURSE PRACTITIONER

## 2017-01-01 PROCEDURE — 63710000001 PROMETHAZINE PER 12.5 MG: Performed by: INTERNAL MEDICINE

## 2017-01-01 PROCEDURE — 25010000002 PERFLUTREN (DEFINITY) 8.476 MG IN SODIUM CHLORIDE 10 ML INJECTION: Performed by: INTERNAL MEDICINE

## 2017-01-01 PROCEDURE — 25010000002 ALTEPLASE 2 MG RECONSTITUTED SOLUTION: Performed by: NURSE PRACTITIONER

## 2017-01-01 PROCEDURE — 99253 IP/OBS CNSLTJ NEW/EST LOW 45: CPT | Performed by: NURSE PRACTITIONER

## 2017-01-01 PROCEDURE — 25010000003 HEPARIN LOCK FLUCH PER 10 UNITS: Performed by: INTERNAL MEDICINE

## 2017-01-01 PROCEDURE — 25010000002 FENTANYL CITRATE (PF) 100 MCG/2ML SOLUTION: Performed by: NURSE ANESTHETIST, CERTIFIED REGISTERED

## 2017-01-01 PROCEDURE — 25010000002 ENOXAPARIN PER 10 MG: Performed by: NURSE PRACTITIONER

## 2017-01-01 PROCEDURE — 25010000002 PROMETHAZINE PER 50 MG: Performed by: NURSE PRACTITIONER

## 2017-01-01 PROCEDURE — 99253 IP/OBS CNSLTJ NEW/EST LOW 45: CPT | Performed by: THORACIC SURGERY (CARDIOTHORACIC VASCULAR SURGERY)

## 2017-01-01 PROCEDURE — 96523 IRRIG DRUG DELIVERY DEVICE: CPT | Performed by: INTERNAL MEDICINE

## 2017-01-01 PROCEDURE — 0 GADOBENATE DIMEGLUMINE 529 MG/ML SOLUTION: Performed by: INTERNAL MEDICINE

## 2017-01-01 PROCEDURE — 99214 OFFICE O/P EST MOD 30 MIN: CPT | Performed by: RADIOLOGY

## 2017-01-01 PROCEDURE — 86920 COMPATIBILITY TEST SPIN: CPT

## 2017-01-01 PROCEDURE — 80061 LIPID PANEL: CPT | Performed by: NURSE PRACTITIONER

## 2017-01-01 PROCEDURE — 0B9P30Z DRAINAGE OF LEFT PLEURA WITH DRAINAGE DEVICE, PERCUTANEOUS APPROACH: ICD-10-PCS | Performed by: THORACIC SURGERY (CARDIOTHORACIC VASCULAR SURGERY)

## 2017-01-01 PROCEDURE — 84484 ASSAY OF TROPONIN QUANT: CPT | Performed by: NURSE PRACTITIONER

## 2017-01-01 PROCEDURE — 83735 ASSAY OF MAGNESIUM: CPT

## 2017-01-01 PROCEDURE — 96415 CHEMO IV INFUSION ADDL HR: CPT | Performed by: INTERNAL MEDICINE

## 2017-01-01 PROCEDURE — 0 GADOBENATE DIMEGLUMINE 529 MG/ML SOLUTION: Performed by: RADIOLOGY

## 2017-01-01 PROCEDURE — 88341 IMHCHEM/IMCYTCHM EA ADD ANTB: CPT | Performed by: INTERNAL MEDICINE

## 2017-01-01 PROCEDURE — 25010000002 GEMCITABINE 200 MG/5.26ML SOLUTION 26.3 ML VIAL: Performed by: INTERNAL MEDICINE

## 2017-01-01 PROCEDURE — 25010000003 CEFAZOLIN IN DEXTROSE 2-4 GM/100ML-% SOLUTION: Performed by: NURSE PRACTITIONER

## 2017-01-01 RX ORDER — SODIUM CHLORIDE 9 MG/ML
75 INJECTION, SOLUTION INTRAVENOUS CONTINUOUS
Status: DISCONTINUED | OUTPATIENT
Start: 2017-01-01 | End: 2017-01-01

## 2017-01-01 RX ORDER — BISACODYL 5 MG/1
10 TABLET, DELAYED RELEASE ORAL DAILY PRN
Status: DISCONTINUED | OUTPATIENT
Start: 2017-01-01 | End: 2017-01-01 | Stop reason: HOSPADM

## 2017-01-01 RX ORDER — PACLITAXEL 100 MG/20ML
100 INJECTION, POWDER, LYOPHILIZED, FOR SUSPENSION INTRAVENOUS ONCE
Status: CANCELLED
Start: 2017-01-01 | End: 2017-01-01

## 2017-01-01 RX ORDER — SCOLOPAMINE TRANSDERMAL SYSTEM 1 MG/1
1 PATCH, EXTENDED RELEASE TRANSDERMAL
Status: DISCONTINUED | OUTPATIENT
Start: 2017-01-01 | End: 2017-01-01 | Stop reason: HOSPADM

## 2017-01-01 RX ORDER — BENZONATATE 100 MG/1
100 CAPSULE ORAL 3 TIMES DAILY PRN
Qty: 90 CAPSULE | Refills: 3 | Status: SHIPPED | OUTPATIENT
Start: 2017-01-01 | End: 2017-01-01

## 2017-01-01 RX ORDER — HYDROMORPHONE HYDROCHLORIDE 1 MG/ML
0.5 INJECTION, SOLUTION INTRAMUSCULAR; INTRAVENOUS; SUBCUTANEOUS
Status: DISCONTINUED | OUTPATIENT
Start: 2017-01-01 | End: 2017-01-01

## 2017-01-01 RX ORDER — PREDNISONE 10 MG/1
10 TABLET ORAL DAILY
Qty: 60 TABLET | Refills: 3 | Status: SHIPPED | OUTPATIENT
Start: 2017-01-01 | End: 2017-01-01

## 2017-01-01 RX ORDER — IBUPROFEN 200 MG
200 TABLET ORAL EVERY 6 HOURS PRN
COMMUNITY
End: 2017-01-01

## 2017-01-01 RX ORDER — MORPHINE SULFATE 100 MG/5ML
10 SOLUTION ORAL
Qty: 120 ML | Refills: 0 | Status: SHIPPED | OUTPATIENT
Start: 2017-01-01

## 2017-01-01 RX ORDER — SODIUM CHLORIDE 0.9 % (FLUSH) 0.9 %
10 SYRINGE (ML) INJECTION AS NEEDED
Status: CANCELLED | OUTPATIENT
Start: 2017-01-01

## 2017-01-01 RX ORDER — PROMETHAZINE HYDROCHLORIDE 12.5 MG/1
12.5 TABLET ORAL EVERY 6 HOURS PRN
Status: DISCONTINUED | OUTPATIENT
Start: 2017-01-01 | End: 2017-01-01 | Stop reason: HOSPADM

## 2017-01-01 RX ORDER — SENNA AND DOCUSATE SODIUM 50; 8.6 MG/1; MG/1
2 TABLET, FILM COATED ORAL DAILY
Qty: 60 TABLET | Refills: 5 | Status: SHIPPED | OUTPATIENT
Start: 2017-01-01

## 2017-01-01 RX ORDER — DEXAMETHASONE SODIUM PHOSPHATE 10 MG/ML
4 INJECTION INTRAMUSCULAR; INTRAVENOUS EVERY 6 HOURS
Status: DISCONTINUED | OUTPATIENT
Start: 2017-01-01 | End: 2017-01-01

## 2017-01-01 RX ORDER — OXYCODONE HYDROCHLORIDE AND ACETAMINOPHEN 5; 325 MG/1; MG/1
1 TABLET ORAL ONCE
Status: COMPLETED | OUTPATIENT
Start: 2017-01-01 | End: 2017-01-01

## 2017-01-01 RX ORDER — PROMETHAZINE HYDROCHLORIDE 25 MG/ML
12.5 INJECTION, SOLUTION INTRAMUSCULAR; INTRAVENOUS EVERY 6 HOURS PRN
Status: DISCONTINUED | OUTPATIENT
Start: 2017-01-01 | End: 2017-01-01 | Stop reason: HOSPADM

## 2017-01-01 RX ORDER — SCOLOPAMINE TRANSDERMAL SYSTEM 1 MG/1
1 PATCH, EXTENDED RELEASE TRANSDERMAL
Qty: 24 PATCH | Refills: 3 | Status: SHIPPED | OUTPATIENT
Start: 2017-01-01

## 2017-01-01 RX ORDER — SODIUM CHLORIDE 9 MG/ML
250 INJECTION, SOLUTION INTRAVENOUS ONCE
Status: CANCELLED | OUTPATIENT
Start: 2017-01-01

## 2017-01-01 RX ORDER — LORAZEPAM 0.5 MG/1
0.5 TABLET ORAL EVERY 4 HOURS PRN
Qty: 120 TABLET | Refills: 0 | Status: SHIPPED | OUTPATIENT
Start: 2017-01-01

## 2017-01-01 RX ORDER — PROMETHAZINE HYDROCHLORIDE 25 MG/1
12.5 TABLET ORAL EVERY 6 HOURS PRN
Status: CANCELLED | OUTPATIENT
Start: 2017-01-01

## 2017-01-01 RX ORDER — SODIUM CHLORIDE 0.9 % (FLUSH) 0.9 %
10 SYRINGE (ML) INJECTION EVERY 12 HOURS SCHEDULED
Status: DISCONTINUED | OUTPATIENT
Start: 2017-01-01 | End: 2017-01-01 | Stop reason: HOSPADM

## 2017-01-01 RX ORDER — CODEINE SULFATE 30 MG/1
60 TABLET ORAL EVERY 6 HOURS PRN
Status: DISCONTINUED | OUTPATIENT
Start: 2017-01-01 | End: 2017-01-01 | Stop reason: HOSPADM

## 2017-01-01 RX ORDER — CODEINE SULFATE 60 MG/1
60 TABLET ORAL EVERY 6 HOURS PRN
Qty: 120 TABLET | Refills: 0 | Status: SHIPPED | OUTPATIENT
Start: 2017-01-01 | End: 2017-01-01 | Stop reason: HOSPADM

## 2017-01-01 RX ORDER — PACLITAXEL 100 MG/20ML
100 INJECTION, POWDER, LYOPHILIZED, FOR SUSPENSION INTRAVENOUS ONCE
Status: COMPLETED | OUTPATIENT
Start: 2017-01-01 | End: 2017-01-01

## 2017-01-01 RX ORDER — LACTULOSE 10 G/15ML
10 SOLUTION ORAL EVERY 12 HOURS PRN
Status: DISCONTINUED | OUTPATIENT
Start: 2017-01-01 | End: 2017-01-01 | Stop reason: HOSPADM

## 2017-01-01 RX ORDER — GUAIFENESIN AND CODEINE PHOSPHATE 100; 10 MG/5ML; MG/5ML
5 SOLUTION ORAL 2 TIMES DAILY
Qty: 180 ML | Refills: 0 | Status: SHIPPED | OUTPATIENT
Start: 2017-01-01 | End: 2017-01-01 | Stop reason: HOSPADM

## 2017-01-01 RX ORDER — LIDOCAINE HYDROCHLORIDE 10 MG/ML
20 INJECTION, SOLUTION INFILTRATION; PERINEURAL ONCE
Status: COMPLETED | OUTPATIENT
Start: 2017-01-01 | End: 2017-01-01

## 2017-01-01 RX ORDER — HYDRALAZINE HYDROCHLORIDE 20 MG/ML
5 INJECTION INTRAMUSCULAR; INTRAVENOUS
Status: DISCONTINUED | OUTPATIENT
Start: 2017-01-01 | End: 2017-01-01 | Stop reason: HOSPADM

## 2017-01-01 RX ORDER — LAPATINIB 250 MG/1
1250 TABLET ORAL DAILY
Qty: 150 TABLET | Refills: 6 | Status: SHIPPED | OUTPATIENT
Start: 2017-01-01 | End: 2017-01-01 | Stop reason: ALTCHOICE

## 2017-01-01 RX ORDER — SODIUM CHLORIDE 9 MG/ML
250 INJECTION, SOLUTION INTRAVENOUS ONCE
Status: COMPLETED | OUTPATIENT
Start: 2017-01-01 | End: 2017-01-01

## 2017-01-01 RX ORDER — FENTANYL 12 UG/H
1 PATCH TRANSDERMAL
Qty: 10 PATCH | Refills: 0 | Status: SHIPPED | OUTPATIENT
Start: 2017-01-01 | End: 2017-01-01

## 2017-01-01 RX ORDER — HYDROMORPHONE HYDROCHLORIDE 1 MG/ML
0.5 INJECTION, SOLUTION INTRAMUSCULAR; INTRAVENOUS; SUBCUTANEOUS
Status: DISCONTINUED | OUTPATIENT
Start: 2017-01-01 | End: 2017-01-01 | Stop reason: HOSPADM

## 2017-01-01 RX ORDER — METRONIDAZOLE 10 MG/G
GEL TOPICAL DAILY
Status: DISCONTINUED | OUTPATIENT
Start: 2017-01-01 | End: 2017-01-01

## 2017-01-01 RX ORDER — SODIUM CHLORIDE 0.9 % (FLUSH) 0.9 %
10 SYRINGE (ML) INJECTION AS NEEDED
Status: DISCONTINUED | OUTPATIENT
Start: 2017-01-01 | End: 2017-01-01 | Stop reason: HOSPADM

## 2017-01-01 RX ORDER — GUAIFENESIN AND PSEUDOEPHEDRINE HCL 1200; 120 MG/1; MG/1
1 TABLET, EXTENDED RELEASE ORAL EVERY 12 HOURS PRN
Status: DISCONTINUED | OUTPATIENT
Start: 2017-01-01 | End: 2017-01-01 | Stop reason: HOSPADM

## 2017-01-01 RX ORDER — NALOXONE HCL 0.4 MG/ML
0.4 VIAL (ML) INJECTION
Status: DISCONTINUED | OUTPATIENT
Start: 2017-01-01 | End: 2017-01-01 | Stop reason: HOSPADM

## 2017-01-01 RX ORDER — HYDROMORPHONE HYDROCHLORIDE 2 MG/1
2 TABLET ORAL
Status: DISCONTINUED | OUTPATIENT
Start: 2017-01-01 | End: 2017-01-01 | Stop reason: HOSPADM

## 2017-01-01 RX ORDER — LACTULOSE 10 G/15ML
20 SOLUTION ORAL 3 TIMES DAILY
Status: DISCONTINUED | OUTPATIENT
Start: 2017-01-01 | End: 2017-01-01

## 2017-01-01 RX ORDER — LORAZEPAM 2 MG/ML
0.5 INJECTION INTRAMUSCULAR EVERY 4 HOURS PRN
Status: DISCONTINUED | OUTPATIENT
Start: 2017-01-01 | End: 2017-01-01 | Stop reason: HOSPADM

## 2017-01-01 RX ORDER — CAPECITABINE 500 MG/1
TABLET, FILM COATED ORAL
Qty: 84 TABLET | Refills: 2 | Status: SHIPPED | OUTPATIENT
Start: 2017-01-01 | End: 2017-01-01 | Stop reason: SDUPTHER

## 2017-01-01 RX ORDER — HEPARIN SODIUM (PORCINE) LOCK FLUSH IV SOLN 100 UNIT/ML 100 UNIT/ML
500 SOLUTION INTRAVENOUS ONCE
Status: COMPLETED | OUTPATIENT
Start: 2017-01-01 | End: 2017-01-01

## 2017-01-01 RX ORDER — HYDROCODONE BITARTRATE AND ACETAMINOPHEN 7.5; 325 MG/1; MG/1
2 TABLET ORAL EVERY 4 HOURS PRN
Status: DISCONTINUED | OUTPATIENT
Start: 2017-01-01 | End: 2017-01-01 | Stop reason: HOSPADM

## 2017-01-01 RX ORDER — PROCHLORPERAZINE MALEATE 10 MG
10 TABLET ORAL ONCE
Status: DISCONTINUED | OUTPATIENT
Start: 2017-01-01 | End: 2017-01-01

## 2017-01-01 RX ORDER — SODIUM CHLORIDE 9 MG/ML
100 INJECTION, SOLUTION INTRAVENOUS CONTINUOUS
Status: DISCONTINUED | OUTPATIENT
Start: 2017-01-01 | End: 2017-01-01

## 2017-01-01 RX ORDER — LACTULOSE 10 G/15ML
30 SOLUTION ORAL ONCE
Status: COMPLETED | OUTPATIENT
Start: 2017-01-01 | End: 2017-01-01

## 2017-01-01 RX ORDER — LACTULOSE 10 G/15ML
20 SOLUTION ORAL DAILY
Qty: 300 ML | Refills: 1 | Status: SHIPPED | OUTPATIENT
Start: 2017-01-01

## 2017-01-01 RX ORDER — PANTOPRAZOLE SODIUM 40 MG/1
40 TABLET, DELAYED RELEASE ORAL NIGHTLY
Status: DISCONTINUED | OUTPATIENT
Start: 2017-01-01 | End: 2017-01-01

## 2017-01-01 RX ORDER — PROCHLORPERAZINE MALEATE 10 MG
10 TABLET ORAL EVERY 6 HOURS PRN
Qty: 30 TABLET | Refills: 2 | COMMUNITY
End: 2017-01-01 | Stop reason: SDUPTHER

## 2017-01-01 RX ORDER — FAMOTIDINE 10 MG/ML
20 INJECTION, SOLUTION INTRAVENOUS
Status: DISCONTINUED | OUTPATIENT
Start: 2017-01-01 | End: 2017-01-01 | Stop reason: HOSPADM

## 2017-01-01 RX ORDER — ALBUTEROL SULFATE 2.5 MG/3ML
2.5 SOLUTION RESPIRATORY (INHALATION) EVERY 6 HOURS PRN
Status: DISCONTINUED | OUTPATIENT
Start: 2017-01-01 | End: 2017-01-01 | Stop reason: HOSPADM

## 2017-01-01 RX ORDER — MAGNESIUM HYDROXIDE 1200 MG/15ML
LIQUID ORAL AS NEEDED
Status: DISCONTINUED | OUTPATIENT
Start: 2017-01-01 | End: 2017-01-01 | Stop reason: HOSPADM

## 2017-01-01 RX ORDER — DEXAMETHASONE SODIUM PHOSPHATE 10 MG/ML
INJECTION INTRAMUSCULAR; INTRAVENOUS AS NEEDED
Status: DISCONTINUED | OUTPATIENT
Start: 2017-01-01 | End: 2017-01-01 | Stop reason: SURG

## 2017-01-01 RX ORDER — ONDANSETRON HYDROCHLORIDE 8 MG/1
8 TABLET, FILM COATED ORAL EVERY 8 HOURS PRN
Qty: 30 TABLET | Refills: 2 | Status: SHIPPED | OUTPATIENT
Start: 2017-01-01

## 2017-01-01 RX ORDER — ACETAMINOPHEN 325 MG/1
650 TABLET ORAL ONCE
Status: COMPLETED | OUTPATIENT
Start: 2017-01-01 | End: 2017-01-01

## 2017-01-01 RX ORDER — PREDNISONE 10 MG/1
10 TABLET ORAL DAILY
Status: DISCONTINUED | OUTPATIENT
Start: 2017-01-01 | End: 2017-01-01

## 2017-01-01 RX ORDER — MORPHINE SULFATE 100 MG/5ML
10 SOLUTION ORAL EVERY 12 HOURS PRN
Qty: 120 ML | Refills: 0 | Status: SHIPPED | OUTPATIENT
Start: 2017-01-01 | End: 2017-01-01

## 2017-01-01 RX ORDER — BISACODYL 5 MG/1
10 TABLET, DELAYED RELEASE ORAL DAILY PRN
COMMUNITY

## 2017-01-01 RX ORDER — MIDAZOLAM HYDROCHLORIDE 1 MG/ML
1 INJECTION INTRAMUSCULAR; INTRAVENOUS
Status: DISCONTINUED | OUTPATIENT
Start: 2017-01-01 | End: 2017-01-01 | Stop reason: HOSPADM

## 2017-01-01 RX ORDER — MULTIPLE VITAMINS W/ MINERALS TAB 9MG-400MCG
1 TAB ORAL DAILY
Status: DISCONTINUED | OUTPATIENT
Start: 2017-01-01 | End: 2017-01-01 | Stop reason: HOSPADM

## 2017-01-01 RX ORDER — SODIUM CHLORIDE 9 MG/ML
100 INJECTION, SOLUTION INTRAVENOUS CONTINUOUS
Status: CANCELLED | OUTPATIENT
Start: 2017-01-01

## 2017-01-01 RX ORDER — PROMETHAZINE HYDROCHLORIDE 25 MG/ML
12.5 INJECTION, SOLUTION INTRAMUSCULAR; INTRAVENOUS EVERY 4 HOURS PRN
Status: DISCONTINUED | OUTPATIENT
Start: 2017-01-01 | End: 2017-01-01 | Stop reason: HOSPADM

## 2017-01-01 RX ORDER — CAPECITABINE 500 MG/1
TABLET, FILM COATED ORAL
Qty: 70 TABLET | Refills: 2 | Status: SHIPPED | OUTPATIENT
Start: 2017-01-01 | End: 2017-01-01 | Stop reason: ALTCHOICE

## 2017-01-01 RX ORDER — PROCHLORPERAZINE MALEATE 10 MG
10 TABLET ORAL EVERY 6 HOURS PRN
COMMUNITY
End: 2017-01-01

## 2017-01-01 RX ORDER — HYDROMORPHONE HYDROCHLORIDE 1 MG/ML
0.25 INJECTION, SOLUTION INTRAMUSCULAR; INTRAVENOUS; SUBCUTANEOUS
Status: DISCONTINUED | OUTPATIENT
Start: 2017-01-01 | End: 2017-01-01 | Stop reason: HOSPADM

## 2017-01-01 RX ORDER — BENZONATATE 100 MG/1
100 CAPSULE ORAL 3 TIMES DAILY PRN
Status: DISCONTINUED | OUTPATIENT
Start: 2017-01-01 | End: 2017-01-01 | Stop reason: HOSPADM

## 2017-01-01 RX ORDER — LEVOFLOXACIN 500 MG/1
500 TABLET, FILM COATED ORAL 2 TIMES DAILY
Qty: 14 TABLET | Refills: 0 | Status: SHIPPED | OUTPATIENT
Start: 2017-01-01 | End: 2017-01-01 | Stop reason: SDDI

## 2017-01-01 RX ORDER — HYDROCODONE BITARTRATE AND ACETAMINOPHEN 5; 325 MG/1; MG/1
1 TABLET ORAL ONCE
Status: COMPLETED | OUTPATIENT
Start: 2017-01-01 | End: 2017-01-01

## 2017-01-01 RX ORDER — BISACODYL 5 MG/1
5 TABLET, DELAYED RELEASE ORAL DAILY PRN
Status: CANCELLED | OUTPATIENT
Start: 2017-01-01

## 2017-01-01 RX ORDER — PROMETHAZINE HYDROCHLORIDE 25 MG/1
12.5 TABLET ORAL EVERY 6 HOURS PRN
Status: DISCONTINUED | OUTPATIENT
Start: 2017-01-01 | End: 2017-01-01 | Stop reason: HOSPADM

## 2017-01-01 RX ORDER — MORPHINE SULFATE 100 MG/5ML
20 SOLUTION ORAL
Status: DISCONTINUED | OUTPATIENT
Start: 2017-01-01 | End: 2017-01-01

## 2017-01-01 RX ORDER — PROCHLORPERAZINE MALEATE 10 MG
10 TABLET ORAL EVERY 6 HOURS PRN
Qty: 30 TABLET | Refills: 2 | Status: SHIPPED | OUTPATIENT
Start: 2017-01-01 | End: 2017-01-01

## 2017-01-01 RX ORDER — ONDANSETRON 2 MG/ML
INJECTION INTRAMUSCULAR; INTRAVENOUS AS NEEDED
Status: DISCONTINUED | OUTPATIENT
Start: 2017-01-01 | End: 2017-01-01 | Stop reason: SURG

## 2017-01-01 RX ORDER — CEFAZOLIN SODIUM 2 G/100ML
2 INJECTION, SOLUTION INTRAVENOUS
Status: COMPLETED | OUTPATIENT
Start: 2017-01-01 | End: 2017-01-01

## 2017-01-01 RX ORDER — ONDANSETRON 4 MG/1
8 TABLET, FILM COATED ORAL EVERY 8 HOURS PRN
Status: DISCONTINUED | OUTPATIENT
Start: 2017-01-01 | End: 2017-01-01 | Stop reason: HOSPADM

## 2017-01-01 RX ORDER — FENTANYL 25 UG/H
1 PATCH TRANSDERMAL
Status: DISCONTINUED | OUTPATIENT
Start: 2017-01-01 | End: 2017-01-01 | Stop reason: HOSPADM

## 2017-01-01 RX ORDER — ALPRAZOLAM 0.5 MG/1
0.5 TABLET ORAL ONCE
Status: COMPLETED | OUTPATIENT
Start: 2017-01-01 | End: 2017-01-01

## 2017-01-01 RX ORDER — GRANISETRON HYDROCHLORIDE 1 MG/ML
1 INJECTION INTRAVENOUS ONCE
Status: COMPLETED | OUTPATIENT
Start: 2017-01-01 | End: 2017-01-01

## 2017-01-01 RX ORDER — SODIUM CHLORIDE 9 MG/ML
250 INJECTION, SOLUTION INTRAVENOUS AS NEEDED
Status: CANCELLED | OUTPATIENT
Start: 2017-01-01

## 2017-01-01 RX ORDER — HYDROMORPHONE HYDROCHLORIDE 2 MG/1
1 TABLET ORAL
Qty: 100 TABLET | Refills: 0 | Status: SHIPPED | OUTPATIENT
Start: 2017-01-01 | End: 2017-01-01

## 2017-01-01 RX ORDER — SODIUM CHLORIDE 1000 MG
1 TABLET, SOLUBLE MISCELLANEOUS
Status: DISCONTINUED | OUTPATIENT
Start: 2017-01-01 | End: 2017-01-01

## 2017-01-01 RX ORDER — HYDROMORPHONE HYDROCHLORIDE 2 MG/1
1 TABLET ORAL
Status: DISCONTINUED | OUTPATIENT
Start: 2017-01-01 | End: 2017-01-01 | Stop reason: HOSPADM

## 2017-01-01 RX ORDER — SENNA AND DOCUSATE SODIUM 50; 8.6 MG/1; MG/1
2 TABLET, FILM COATED ORAL 2 TIMES DAILY
Status: DISCONTINUED | OUTPATIENT
Start: 2017-01-01 | End: 2017-01-01 | Stop reason: HOSPADM

## 2017-01-01 RX ORDER — FENTANYL 25 UG/H
1 PATCH TRANSDERMAL
Qty: 10 PATCH | Refills: 0 | Status: SHIPPED | OUTPATIENT
Start: 2017-01-01

## 2017-01-01 RX ORDER — ONDANSETRON 2 MG/ML
4 INJECTION INTRAMUSCULAR; INTRAVENOUS EVERY 6 HOURS PRN
Status: DISCONTINUED | OUTPATIENT
Start: 2017-01-01 | End: 2017-01-01 | Stop reason: HOSPADM

## 2017-01-01 RX ORDER — IPRATROPIUM BROMIDE AND ALBUTEROL SULFATE 2.5; .5 MG/3ML; MG/3ML
3 SOLUTION RESPIRATORY (INHALATION) ONCE AS NEEDED
Status: DISCONTINUED | OUTPATIENT
Start: 2017-01-01 | End: 2017-01-01 | Stop reason: HOSPADM

## 2017-01-01 RX ORDER — PALONOSETRON 0.05 MG/ML
0.25 INJECTION, SOLUTION INTRAVENOUS ONCE
Status: COMPLETED | OUTPATIENT
Start: 2017-01-01 | End: 2017-01-01

## 2017-01-01 RX ORDER — FENTANYL 25 UG/H
1 PATCH TRANSDERMAL
Qty: 10 PATCH | Refills: 0 | Status: SHIPPED | OUTPATIENT
Start: 2017-01-01 | End: 2017-01-01 | Stop reason: SDUPTHER

## 2017-01-01 RX ORDER — SODIUM CHLORIDE 0.9 % (FLUSH) 0.9 %
1-10 SYRINGE (ML) INJECTION AS NEEDED
Status: DISCONTINUED | OUTPATIENT
Start: 2017-01-01 | End: 2017-01-01 | Stop reason: HOSPADM

## 2017-01-01 RX ORDER — SODIUM CHLORIDE 9 MG/ML
250 INJECTION, SOLUTION INTRAVENOUS AS NEEDED
Status: DISCONTINUED | OUTPATIENT
Start: 2017-01-01 | End: 2017-01-01 | Stop reason: HOSPADM

## 2017-01-01 RX ORDER — PROCHLORPERAZINE MALEATE 10 MG
10 TABLET ORAL ONCE
Status: CANCELLED | OUTPATIENT
Start: 2017-01-01

## 2017-01-01 RX ORDER — POLYETHYLENE GLYCOL 3350 17 G/17G
17 POWDER, FOR SOLUTION ORAL DAILY PRN
Status: DISCONTINUED | OUTPATIENT
Start: 2017-01-01 | End: 2017-01-01 | Stop reason: HOSPADM

## 2017-01-01 RX ORDER — ONDANSETRON 2 MG/ML
4 INJECTION INTRAMUSCULAR; INTRAVENOUS ONCE AS NEEDED
Status: DISCONTINUED | OUTPATIENT
Start: 2017-01-01 | End: 2017-01-01 | Stop reason: HOSPADM

## 2017-01-01 RX ORDER — SODIUM CHLORIDE 9 MG/ML
INJECTION, SOLUTION INTRAVENOUS CONTINUOUS PRN
Status: DISCONTINUED | OUTPATIENT
Start: 2017-01-01 | End: 2017-01-01 | Stop reason: HOSPADM

## 2017-01-01 RX ORDER — SODIUM CHLORIDE, SODIUM LACTATE, POTASSIUM CHLORIDE, CALCIUM CHLORIDE 600; 310; 30; 20 MG/100ML; MG/100ML; MG/100ML; MG/100ML
9 INJECTION, SOLUTION INTRAVENOUS CONTINUOUS PRN
Status: DISCONTINUED | OUTPATIENT
Start: 2017-01-01 | End: 2017-01-01 | Stop reason: HOSPADM

## 2017-01-01 RX ORDER — GRANISETRON HYDROCHLORIDE 1 MG/ML
1 INJECTION INTRAVENOUS ONCE
Status: CANCELLED | OUTPATIENT
Start: 2017-01-01 | End: 2017-01-01

## 2017-01-01 RX ORDER — DIAZEPAM 5 MG/ML
10 INJECTION, SOLUTION INTRAMUSCULAR; INTRAVENOUS ONCE
Status: DISCONTINUED | OUTPATIENT
Start: 2017-01-01 | End: 2017-01-01

## 2017-01-01 RX ORDER — MORPHINE SULFATE 100 MG/5ML
10 SOLUTION ORAL EVERY 12 HOURS PRN
Status: DISCONTINUED | OUTPATIENT
Start: 2017-01-01 | End: 2017-01-01 | Stop reason: HOSPADM

## 2017-01-01 RX ORDER — LACTULOSE 10 G/15ML
20 SOLUTION ORAL 3 TIMES DAILY
Status: DISPENSED | OUTPATIENT
Start: 2017-01-01 | End: 2017-01-01

## 2017-01-01 RX ORDER — CIPROFLOXACIN 500 MG/1
500 TABLET, FILM COATED ORAL 2 TIMES DAILY
Qty: 28 TABLET | Refills: 0 | Status: SHIPPED | OUTPATIENT
Start: 2017-01-01 | End: 2017-01-01 | Stop reason: HOSPADM

## 2017-01-01 RX ORDER — PROPOFOL 10 MG/ML
VIAL (ML) INTRAVENOUS AS NEEDED
Status: DISCONTINUED | OUTPATIENT
Start: 2017-01-01 | End: 2017-01-01 | Stop reason: SURG

## 2017-01-01 RX ORDER — SODIUM CHLORIDE 9 MG/ML
500 INJECTION, SOLUTION INTRAVENOUS ONCE
Status: COMPLETED | OUTPATIENT
Start: 2017-01-01 | End: 2017-01-01

## 2017-01-01 RX ORDER — PROCHLORPERAZINE MALEATE 10 MG
10 TABLET ORAL EVERY 6 HOURS PRN
Status: DISCONTINUED | OUTPATIENT
Start: 2017-01-01 | End: 2017-01-01 | Stop reason: HOSPADM

## 2017-01-01 RX ORDER — PROMETHAZINE HYDROCHLORIDE 12.5 MG/1
12.5 TABLET ORAL EVERY 6 HOURS PRN
Qty: 40 TABLET | Refills: 1 | Status: SHIPPED | OUTPATIENT
Start: 2017-01-01 | End: 2017-01-01

## 2017-01-01 RX ORDER — GUAIFENESIN AND PSEUDOEPHEDRINE HCL 1200; 120 MG/1; MG/1
1 TABLET, EXTENDED RELEASE ORAL EVERY 12 HOURS PRN
Status: DISCONTINUED | OUTPATIENT
Start: 2017-01-01 | End: 2017-01-01 | Stop reason: SDUPTHER

## 2017-01-01 RX ORDER — LEVOFLOXACIN 250 MG/1
TABLET ORAL
Qty: 14 TABLET | Refills: 0 | Status: SHIPPED | OUTPATIENT
Start: 2017-01-01 | End: 2017-01-01 | Stop reason: DRUGHIGH

## 2017-01-01 RX ORDER — GUAIFENESIN AND DEXTROMETHORPHAN HYDROBROMIDE 600; 30 MG/1; MG/1
1 TABLET, EXTENDED RELEASE ORAL 2 TIMES DAILY PRN
Status: DISCONTINUED | OUTPATIENT
Start: 2017-01-01 | End: 2017-01-01

## 2017-01-01 RX ORDER — DEXAMETHASONE SODIUM PHOSPHATE 4 MG/ML
4 INJECTION, SOLUTION INTRA-ARTICULAR; INTRALESIONAL; INTRAMUSCULAR; INTRAVENOUS; SOFT TISSUE ONCE
Status: COMPLETED | OUTPATIENT
Start: 2017-01-01 | End: 2017-01-01

## 2017-01-01 RX ORDER — TC 99M MEDRONATE 20 MG/10ML
21.8 INJECTION, POWDER, LYOPHILIZED, FOR SOLUTION INTRAVENOUS
Status: COMPLETED | OUTPATIENT
Start: 2017-01-01 | End: 2017-01-01

## 2017-01-01 RX ORDER — BISACODYL 5 MG/1
5 TABLET, DELAYED RELEASE ORAL DAILY PRN
Status: DISCONTINUED | OUTPATIENT
Start: 2017-01-01 | End: 2017-01-01 | Stop reason: HOSPADM

## 2017-01-01 RX ORDER — SODIUM CHLORIDE 1000 MG
1 TABLET, SOLUBLE MISCELLANEOUS 2 TIMES DAILY WITH MEALS
Status: DISCONTINUED | OUTPATIENT
Start: 2017-01-01 | End: 2017-01-01

## 2017-01-01 RX ORDER — OXYCODONE HYDROCHLORIDE AND ACETAMINOPHEN 5; 325 MG/1; MG/1
1 TABLET ORAL EVERY 4 HOURS PRN
Status: DISCONTINUED | OUTPATIENT
Start: 2017-01-01 | End: 2017-01-01 | Stop reason: HOSPADM

## 2017-01-01 RX ORDER — CAPECITABINE 500 MG/1
TABLET, FILM COATED ORAL
Qty: 70 TABLET | Refills: 2 | Status: SHIPPED | OUTPATIENT
Start: 2017-01-01 | End: 2017-01-01 | Stop reason: SDUPTHER

## 2017-01-01 RX ORDER — SENNA AND DOCUSATE SODIUM 50; 8.6 MG/1; MG/1
2 TABLET, FILM COATED ORAL DAILY
Status: DISCONTINUED | OUTPATIENT
Start: 2017-01-01 | End: 2017-01-01

## 2017-01-01 RX ORDER — METRONIDAZOLE 10 MG/G
GEL TOPICAL DAILY
Qty: 60 G | Refills: 0 | Status: SHIPPED | OUTPATIENT
Start: 2017-01-01 | End: 2017-01-01 | Stop reason: HOSPADM

## 2017-01-01 RX ORDER — OXYCODONE HYDROCHLORIDE AND ACETAMINOPHEN 5; 325 MG/1; MG/1
1 TABLET ORAL EVERY 4 HOURS PRN
Qty: 100 TABLET | Refills: 0 | Status: SHIPPED | OUTPATIENT
Start: 2017-01-01 | End: 2017-01-01 | Stop reason: HOSPADM

## 2017-01-01 RX ORDER — PREDNISONE 10 MG/1
TABLET ORAL
Refills: 3 | COMMUNITY
Start: 2017-01-01

## 2017-01-01 RX ORDER — DIPHENHYDRAMINE HCL 25 MG
25 CAPSULE ORAL ONCE
Status: CANCELLED | OUTPATIENT
Start: 2017-01-01 | End: 2017-01-01

## 2017-01-01 RX ORDER — SODIUM CHLORIDE 9 MG/ML
500 INJECTION, SOLUTION INTRAVENOUS ONCE
Status: CANCELLED
Start: 2017-01-01 | End: 2017-01-01

## 2017-01-01 RX ORDER — GRANISETRON HYDROCHLORIDE 1 MG/ML
1 INJECTION INTRAVENOUS ONCE
Status: DISCONTINUED | OUTPATIENT
Start: 2017-01-01 | End: 2017-01-01

## 2017-01-01 RX ORDER — SODIUM CHLORIDE 9 MG/ML
1000 INJECTION, SOLUTION INTRAVENOUS ONCE
Status: CANCELLED | OUTPATIENT
Start: 2017-01-01

## 2017-01-01 RX ORDER — FENTANYL CITRATE 50 UG/ML
25 INJECTION, SOLUTION INTRAMUSCULAR; INTRAVENOUS
Status: DISCONTINUED | OUTPATIENT
Start: 2017-01-01 | End: 2017-01-01 | Stop reason: HOSPADM

## 2017-01-01 RX ORDER — LACTULOSE 10 G/15ML
20 SOLUTION ORAL DAILY PRN
Status: DISCONTINUED | OUTPATIENT
Start: 2017-01-01 | End: 2017-01-01 | Stop reason: HOSPADM

## 2017-01-01 RX ORDER — LACTULOSE 10 G/15ML
20 SOLUTION ORAL DAILY
Status: DISCONTINUED | OUTPATIENT
Start: 2017-01-01 | End: 2017-01-01 | Stop reason: HOSPADM

## 2017-01-01 RX ORDER — LABETALOL HYDROCHLORIDE 5 MG/ML
5 INJECTION, SOLUTION INTRAVENOUS
Status: DISCONTINUED | OUTPATIENT
Start: 2017-01-01 | End: 2017-01-01 | Stop reason: HOSPADM

## 2017-01-01 RX ORDER — SODIUM CHLORIDE 9 MG/ML
1000 INJECTION, SOLUTION INTRAVENOUS ONCE
Status: DISCONTINUED | OUTPATIENT
Start: 2017-01-01 | End: 2017-01-01

## 2017-01-01 RX ORDER — DIPHENHYDRAMINE HCL 25 MG
25 CAPSULE ORAL ONCE
Status: COMPLETED | OUTPATIENT
Start: 2017-01-01 | End: 2017-01-01

## 2017-01-01 RX ORDER — MIDAZOLAM HYDROCHLORIDE 1 MG/ML
2 INJECTION INTRAMUSCULAR; INTRAVENOUS
Status: DISCONTINUED | OUTPATIENT
Start: 2017-01-01 | End: 2017-01-01 | Stop reason: HOSPADM

## 2017-01-01 RX ORDER — SENNA AND DOCUSATE SODIUM 50; 8.6 MG/1; MG/1
2 TABLET, FILM COATED ORAL DAILY
Status: DISCONTINUED | OUTPATIENT
Start: 2017-01-01 | End: 2017-01-01 | Stop reason: HOSPADM

## 2017-01-01 RX ORDER — PREDNISONE 10 MG/1
10 TABLET ORAL
Status: DISCONTINUED | OUTPATIENT
Start: 2017-01-01 | End: 2017-01-01 | Stop reason: HOSPADM

## 2017-01-01 RX ORDER — PROMETHAZINE HYDROCHLORIDE 25 MG/ML
12.5 INJECTION, SOLUTION INTRAMUSCULAR; INTRAVENOUS EVERY 6 HOURS PRN
Status: CANCELLED | OUTPATIENT
Start: 2017-01-01

## 2017-01-01 RX ORDER — LIDOCAINE HYDROCHLORIDE 20 MG/ML
INJECTION, SOLUTION INFILTRATION; PERINEURAL AS NEEDED
Status: DISCONTINUED | OUTPATIENT
Start: 2017-01-01 | End: 2017-01-01 | Stop reason: SURG

## 2017-01-01 RX ORDER — ACETAMINOPHEN 325 MG/1
650 TABLET ORAL ONCE
Status: CANCELLED | OUTPATIENT
Start: 2017-01-01 | End: 2017-01-01

## 2017-01-01 RX ORDER — DIAZEPAM 5 MG/ML
5 INJECTION, SOLUTION INTRAMUSCULAR; INTRAVENOUS ONCE
Status: COMPLETED | OUTPATIENT
Start: 2017-01-01 | End: 2017-01-01

## 2017-01-01 RX ADMIN — MULTIPLE VITAMINS W/ MINERALS TAB 1 TABLET: TAB at 10:49

## 2017-01-01 RX ADMIN — TBO-FILGRASTIM 480 MCG: 480 INJECTION, SOLUTION SUBCUTANEOUS at 15:05

## 2017-01-01 RX ADMIN — DOCUSATE SODIUM,SENNOSIDES 2 TABLET: 50; 8.6 TABLET, FILM COATED ORAL at 08:42

## 2017-01-01 RX ADMIN — DEXAMETHASONE SODIUM PHOSPHATE 12 MG: 10 INJECTION INTRAMUSCULAR; INTRAVENOUS at 11:06

## 2017-01-01 RX ADMIN — HYDROMORPHONE HYDROCHLORIDE 1 MG: 2 TABLET ORAL at 18:26

## 2017-01-01 RX ADMIN — HYDROMORPHONE HYDROCHLORIDE 0.5 MG: 1 INJECTION, SOLUTION INTRAMUSCULAR; INTRAVENOUS; SUBCUTANEOUS at 08:52

## 2017-01-01 RX ADMIN — HYDROCODONE BITARTRATE AND ACETAMINOPHEN 1 TABLET: 5; 325 TABLET ORAL at 12:34

## 2017-01-01 RX ADMIN — ENOXAPARIN SODIUM 40 MG: 40 INJECTION SUBCUTANEOUS at 16:31

## 2017-01-01 RX ADMIN — HYDROMORPHONE HYDROCHLORIDE 0.5 MG: 1 INJECTION, SOLUTION INTRAMUSCULAR; INTRAVENOUS; SUBCUTANEOUS at 07:50

## 2017-01-01 RX ADMIN — BENZONATATE 100 MG: 100 CAPSULE ORAL at 00:01

## 2017-01-01 RX ADMIN — HYDROMORPHONE HYDROCHLORIDE 0.5 MG: 1 INJECTION, SOLUTION INTRAMUSCULAR; INTRAVENOUS; SUBCUTANEOUS at 15:01

## 2017-01-01 RX ADMIN — PANTOPRAZOLE SODIUM 40 MG: 40 TABLET, DELAYED RELEASE ORAL at 21:24

## 2017-01-01 RX ADMIN — HYDROMORPHONE HYDROCHLORIDE 0.5 MG: 1 INJECTION, SOLUTION INTRAMUSCULAR; INTRAVENOUS; SUBCUTANEOUS at 07:28

## 2017-01-01 RX ADMIN — BENZONATATE 100 MG: 100 CAPSULE ORAL at 20:29

## 2017-01-01 RX ADMIN — DEXAMETHASONE SODIUM PHOSPHATE 4 MG: 10 INJECTION INTRAMUSCULAR; INTRAVENOUS at 18:04

## 2017-01-01 RX ADMIN — Medication 500 UNITS: at 16:21

## 2017-01-01 RX ADMIN — HYDROMORPHONE HYDROCHLORIDE 0.25 MG: 2 INJECTION, SOLUTION INTRAMUSCULAR; INTRAVENOUS; SUBCUTANEOUS at 02:16

## 2017-01-01 RX ADMIN — DEXAMETHASONE SODIUM PHOSPHATE 12 MG: 10 INJECTION INTRAMUSCULAR; INTRAVENOUS at 13:23

## 2017-01-01 RX ADMIN — DOCUSATE SODIUM,SENNOSIDES 2 TABLET: 50; 8.6 TABLET, FILM COATED ORAL at 08:39

## 2017-01-01 RX ADMIN — SODIUM CHLORIDE, PRESERVATIVE FREE 500 UNITS: 5 INJECTION INTRAVENOUS at 18:54

## 2017-01-01 RX ADMIN — POLYETHYLENE GLYCOL 3350 17 G: 17 POWDER, FOR SOLUTION ORAL at 23:07

## 2017-01-01 RX ADMIN — Medication 10 ML: at 13:44

## 2017-01-01 RX ADMIN — GADOBENATE DIMEGLUMINE 12 ML: 529 INJECTION, SOLUTION INTRAVENOUS at 21:01

## 2017-01-01 RX ADMIN — HYDROMORPHONE HYDROCHLORIDE 0.5 MG: 1 INJECTION, SOLUTION INTRAMUSCULAR; INTRAVENOUS; SUBCUTANEOUS at 22:41

## 2017-01-01 RX ADMIN — DOCUSATE SODIUM,SENNOSIDES 2 TABLET: 50; 8.6 TABLET, FILM COATED ORAL at 10:49

## 2017-01-01 RX ADMIN — Medication 370 MG: at 14:32

## 2017-01-01 RX ADMIN — IOPAMIDOL 85 ML: 612 INJECTION, SOLUTION INTRAVENOUS at 13:34

## 2017-01-01 RX ADMIN — HYDROMORPHONE HYDROCHLORIDE 0.25 MG: 2 INJECTION, SOLUTION INTRAMUSCULAR; INTRAVENOUS; SUBCUTANEOUS at 19:14

## 2017-01-01 RX ADMIN — LACTULOSE 20 G: 20 SOLUTION ORAL at 21:18

## 2017-01-01 RX ADMIN — PERFLUTREN 1.5 ML: 6.52 INJECTION, SUSPENSION INTRAVENOUS at 08:04

## 2017-01-01 RX ADMIN — HYDROMORPHONE HYDROCHLORIDE 0.5 MG: 1 INJECTION, SOLUTION INTRAMUSCULAR; INTRAVENOUS; SUBCUTANEOUS at 20:18

## 2017-01-01 RX ADMIN — HYDROMORPHONE HYDROCHLORIDE 0.5 MG: 1 INJECTION, SOLUTION INTRAMUSCULAR; INTRAVENOUS; SUBCUTANEOUS at 19:05

## 2017-01-01 RX ADMIN — SCOPOLAMINE 1 PATCH: 1 PATCH, EXTENDED RELEASE TRANSDERMAL at 08:25

## 2017-01-01 RX ADMIN — GUAIFENESIN AND PSEUDOEPHEDRINE HCL 1 TABLET: 1200; 120 TABLET, EXTENDED RELEASE ORAL at 02:44

## 2017-01-01 RX ADMIN — HEPARIN SODIUM (PORCINE) LOCK FLUSH IV SOLN 100 UNIT/ML 500 UNITS: 100 SOLUTION at 10:00

## 2017-01-01 RX ADMIN — DIPHENHYDRAMINE HYDROCHLORIDE 25 MG: 25 CAPSULE ORAL at 09:32

## 2017-01-01 RX ADMIN — BENZONATATE 100 MG: 100 CAPSULE ORAL at 02:13

## 2017-01-01 RX ADMIN — HYDROMORPHONE HYDROCHLORIDE 1 MG: 2 TABLET ORAL at 22:21

## 2017-01-01 RX ADMIN — FENTANYL 1 PATCH: 25 PATCH, EXTENDED RELEASE TRANSDERMAL at 09:29

## 2017-01-01 RX ADMIN — PROPOFOL 120 MG: 10 INJECTION, EMULSION INTRAVENOUS at 14:11

## 2017-01-01 RX ADMIN — LORAZEPAM 0.5 MG: 2 INJECTION INTRAMUSCULAR; INTRAVENOUS at 08:19

## 2017-01-01 RX ADMIN — HYDROCODONE BITARTRATE AND ACETAMINOPHEN 2 TABLET: 7.5; 325 TABLET ORAL at 01:40

## 2017-01-01 RX ADMIN — SCOPOLAMINE 1 PATCH: 1 PATCH, EXTENDED RELEASE TRANSDERMAL at 08:53

## 2017-01-01 RX ADMIN — GADOBENATE DIMEGLUMINE 12 ML: 529 INJECTION, SOLUTION INTRAVENOUS at 09:04

## 2017-01-01 RX ADMIN — HYDROMORPHONE HYDROCHLORIDE 0.5 MG: 1 INJECTION, SOLUTION INTRAMUSCULAR; INTRAVENOUS; SUBCUTANEOUS at 02:09

## 2017-01-01 RX ADMIN — PROMETHAZINE HYDROCHLORIDE 12.5 MG: 25 INJECTION, SOLUTION INTRAMUSCULAR; INTRAVENOUS at 08:19

## 2017-01-01 RX ADMIN — FENTANYL 1 PATCH: 25 PATCH, EXTENDED RELEASE TRANSDERMAL at 08:25

## 2017-01-01 RX ADMIN — Medication 1 G: at 13:49

## 2017-01-01 RX ADMIN — LORAZEPAM 0.5 MG: 2 INJECTION INTRAMUSCULAR; INTRAVENOUS at 01:28

## 2017-01-01 RX ADMIN — SODIUM CHLORIDE 75 ML/HR: 9 INJECTION, SOLUTION INTRAVENOUS at 10:52

## 2017-01-01 RX ADMIN — PROCHLORPERAZINE MALEATE 10 MG: 10 TABLET ORAL at 09:23

## 2017-01-01 RX ADMIN — HYDROMORPHONE HYDROCHLORIDE 1 MG: 1 INJECTION, SOLUTION INTRAMUSCULAR; INTRAVENOUS; SUBCUTANEOUS at 13:34

## 2017-01-01 RX ADMIN — DOCUSATE SODIUM,SENNOSIDES 2 TABLET: 50; 8.6 TABLET, FILM COATED ORAL at 08:25

## 2017-01-01 RX ADMIN — LACTULOSE 10 G: 10 SOLUTION ORAL at 09:56

## 2017-01-01 RX ADMIN — Medication 1 G: at 18:16

## 2017-01-01 RX ADMIN — MULTIPLE VITAMINS W/ MINERALS TAB 1 TABLET: TAB at 08:21

## 2017-01-01 RX ADMIN — HYDROMORPHONE HYDROCHLORIDE 1 MG: 1 INJECTION, SOLUTION INTRAMUSCULAR; INTRAVENOUS; SUBCUTANEOUS at 15:51

## 2017-01-01 RX ADMIN — HYDROMORPHONE HYDROCHLORIDE 0.5 MG: 1 INJECTION, SOLUTION INTRAMUSCULAR; INTRAVENOUS; SUBCUTANEOUS at 02:55

## 2017-01-01 RX ADMIN — ENOXAPARIN SODIUM 40 MG: 40 INJECTION SUBCUTANEOUS at 13:56

## 2017-01-01 RX ADMIN — PANTOPRAZOLE SODIUM 40 MG: 40 TABLET, DELAYED RELEASE ORAL at 23:23

## 2017-01-01 RX ADMIN — DEXAMETHASONE SODIUM PHOSPHATE 6 MG: 10 INJECTION INTRAMUSCULAR; INTRAVENOUS at 14:30

## 2017-01-01 RX ADMIN — LACTULOSE 30 ML: 20 SOLUTION ORAL at 14:44

## 2017-01-01 RX ADMIN — HYDROMORPHONE HYDROCHLORIDE 1 MG: 1 INJECTION, SOLUTION INTRAMUSCULAR; INTRAVENOUS; SUBCUTANEOUS at 12:37

## 2017-01-01 RX ADMIN — Medication 500 UNITS: at 15:07

## 2017-01-01 RX ADMIN — HYDROMORPHONE HYDROCHLORIDE 0.5 MG: 1 INJECTION, SOLUTION INTRAMUSCULAR; INTRAVENOUS; SUBCUTANEOUS at 08:38

## 2017-01-01 RX ADMIN — HYDROMORPHONE HYDROCHLORIDE 0.5 MG: 1 INJECTION, SOLUTION INTRAMUSCULAR; INTRAVENOUS; SUBCUTANEOUS at 10:50

## 2017-01-01 RX ADMIN — HYDROMORPHONE HYDROCHLORIDE 1 MG: 1 INJECTION, SOLUTION INTRAMUSCULAR; INTRAVENOUS; SUBCUTANEOUS at 09:21

## 2017-01-01 RX ADMIN — HYDROMORPHONE HYDROCHLORIDE 0.25 MG: 2 INJECTION, SOLUTION INTRAMUSCULAR; INTRAVENOUS; SUBCUTANEOUS at 04:40

## 2017-01-01 RX ADMIN — HYDROMORPHONE HYDROCHLORIDE 1 MG: 1 INJECTION, SOLUTION INTRAMUSCULAR; INTRAVENOUS; SUBCUTANEOUS at 21:36

## 2017-01-01 RX ADMIN — SODIUM CHLORIDE 250 ML: 900 INJECTION, SOLUTION INTRAVENOUS at 10:08

## 2017-01-01 RX ADMIN — LACTULOSE 20 G: 20 SOLUTION ORAL at 08:21

## 2017-01-01 RX ADMIN — DEXAMETHASONE SODIUM PHOSPHATE 4 MG: 4 INJECTION, SOLUTION INTRA-ARTICULAR; INTRALESIONAL; INTRAMUSCULAR; INTRAVENOUS; SOFT TISSUE at 11:55

## 2017-01-01 RX ADMIN — LIDOCAINE HYDROCHLORIDE 9 ML: 10 INJECTION, SOLUTION INFILTRATION; PERINEURAL at 09:16

## 2017-01-01 RX ADMIN — DEXAMETHASONE SODIUM PHOSPHATE 12 MG: 10 INJECTION INTRAMUSCULAR; INTRAVENOUS at 10:08

## 2017-01-01 RX ADMIN — DEXAMETHASONE SODIUM PHOSPHATE 12 MG: 10 INJECTION INTRAMUSCULAR; INTRAVENOUS at 10:47

## 2017-01-01 RX ADMIN — HYDROMORPHONE HYDROCHLORIDE 0.5 MG: 1 INJECTION, SOLUTION INTRAMUSCULAR; INTRAVENOUS; SUBCUTANEOUS at 05:10

## 2017-01-01 RX ADMIN — SODIUM CHLORIDE, PRESERVATIVE FREE 500 UNITS: 5 INJECTION INTRAVENOUS at 20:48

## 2017-01-01 RX ADMIN — Medication 500 UNITS: at 16:16

## 2017-01-01 RX ADMIN — Medication 1 G: at 17:44

## 2017-01-01 RX ADMIN — MULTIPLE VITAMINS W/ MINERALS TAB 1 TABLET: TAB at 08:57

## 2017-01-01 RX ADMIN — Medication 10 ML: at 20:48

## 2017-01-01 RX ADMIN — ALTEPLASE 2 MG: 2.2 INJECTION, POWDER, LYOPHILIZED, FOR SOLUTION INTRAVENOUS at 10:57

## 2017-01-01 RX ADMIN — ALPRAZOLAM 0.5 MG: 0.5 TABLET ORAL at 13:50

## 2017-01-01 RX ADMIN — ALTEPLASE 2 MG: 2.2 INJECTION, POWDER, LYOPHILIZED, FOR SOLUTION INTRAVENOUS at 09:20

## 2017-01-01 RX ADMIN — METOPROLOL TARTRATE 25 MG: 25 TABLET ORAL at 09:41

## 2017-01-01 RX ADMIN — PHENYLEPHRINE HYDROCHLORIDE 100 MCG: 10 INJECTION INTRAVENOUS at 14:30

## 2017-01-01 RX ADMIN — HYDROMORPHONE HYDROCHLORIDE 0.5 MG: 1 INJECTION, SOLUTION INTRAMUSCULAR; INTRAVENOUS; SUBCUTANEOUS at 17:54

## 2017-01-01 RX ADMIN — TRASTUZUMAB 370 MG: 150 INJECTION, POWDER, LYOPHILIZED, FOR SOLUTION INTRAVENOUS at 11:56

## 2017-01-01 RX ADMIN — GUAIFENESIN AND PSEUDOEPHEDRINE HCL 1 TABLET: 1200; 120 TABLET, EXTENDED RELEASE ORAL at 16:21

## 2017-01-01 RX ADMIN — SODIUM BICARBONATE 5 ML: 84 INJECTION, SOLUTION INTRAVENOUS at 14:32

## 2017-01-01 RX ADMIN — Medication 21.8 MILLICURIE: at 07:15

## 2017-01-01 RX ADMIN — HYDROMORPHONE HYDROCHLORIDE 1 MG: 2 TABLET ORAL at 16:26

## 2017-01-01 RX ADMIN — DOCUSATE SODIUM,SENNOSIDES 2 TABLET: 50; 8.6 TABLET, FILM COATED ORAL at 17:44

## 2017-01-01 RX ADMIN — ENOXAPARIN SODIUM 30 MG: 30 INJECTION SUBCUTANEOUS at 15:59

## 2017-01-01 RX ADMIN — SODIUM CHLORIDE 250 ML: 900 INJECTION, SOLUTION INTRAVENOUS at 13:21

## 2017-01-01 RX ADMIN — Medication 10 ML: at 21:41

## 2017-01-01 RX ADMIN — Medication 10 ML: at 14:11

## 2017-01-01 RX ADMIN — HYDROMORPHONE HYDROCHLORIDE 1 MG: 1 INJECTION, SOLUTION INTRAMUSCULAR; INTRAVENOUS; SUBCUTANEOUS at 17:14

## 2017-01-01 RX ADMIN — LORAZEPAM 0.5 MG: 2 INJECTION INTRAMUSCULAR; INTRAVENOUS at 10:50

## 2017-01-01 RX ADMIN — HYDROMORPHONE HYDROCHLORIDE 0.5 MG: 1 INJECTION, SOLUTION INTRAMUSCULAR; INTRAVENOUS; SUBCUTANEOUS at 16:36

## 2017-01-01 RX ADMIN — HYDROMORPHONE HYDROCHLORIDE 1 MG: 1 INJECTION, SOLUTION INTRAMUSCULAR; INTRAVENOUS; SUBCUTANEOUS at 06:09

## 2017-01-01 RX ADMIN — HYDROMORPHONE HYDROCHLORIDE 0.5 MG: 1 INJECTION, SOLUTION INTRAMUSCULAR; INTRAVENOUS; SUBCUTANEOUS at 19:44

## 2017-01-01 RX ADMIN — HYDROMORPHONE HYDROCHLORIDE 0.5 MG: 1 INJECTION, SOLUTION INTRAMUSCULAR; INTRAVENOUS; SUBCUTANEOUS at 01:28

## 2017-01-01 RX ADMIN — FENTANYL 1 PATCH: 25 PATCH, EXTENDED RELEASE TRANSDERMAL at 08:53

## 2017-01-01 RX ADMIN — PROMETHAZINE HYDROCHLORIDE 12.5 MG: 25 INJECTION, SOLUTION INTRAMUSCULAR; INTRAVENOUS at 15:23

## 2017-01-01 RX ADMIN — HYDROMORPHONE HYDROCHLORIDE 0.5 MG: 1 INJECTION, SOLUTION INTRAMUSCULAR; INTRAVENOUS; SUBCUTANEOUS at 05:12

## 2017-01-01 RX ADMIN — HYDROMORPHONE HYDROCHLORIDE 0.5 MG: 1 INJECTION, SOLUTION INTRAMUSCULAR; INTRAVENOUS; SUBCUTANEOUS at 13:49

## 2017-01-01 RX ADMIN — DOCUSATE SODIUM,SENNOSIDES 2 TABLET: 50; 8.6 TABLET, FILM COATED ORAL at 08:02

## 2017-01-01 RX ADMIN — BENZONATATE 100 MG: 100 CAPSULE ORAL at 02:54

## 2017-01-01 RX ADMIN — IOPAMIDOL 75 ML: 612 INJECTION, SOLUTION INTRAVENOUS at 16:00

## 2017-01-01 RX ADMIN — HYDROMORPHONE HYDROCHLORIDE 0.5 MG: 1 INJECTION, SOLUTION INTRAMUSCULAR; INTRAVENOUS; SUBCUTANEOUS at 14:11

## 2017-01-01 RX ADMIN — DOCUSATE SODIUM,SENNOSIDES 2 TABLET: 50; 8.6 TABLET, FILM COATED ORAL at 09:56

## 2017-01-01 RX ADMIN — MULTIPLE VITAMINS W/ MINERALS TAB 1 TABLET: TAB at 08:39

## 2017-01-01 RX ADMIN — DOCUSATE SODIUM,SENNOSIDES 2 TABLET: 50; 8.6 TABLET, FILM COATED ORAL at 09:41

## 2017-01-01 RX ADMIN — ALBUTEROL SULFATE 2.5 MG: 2.5 SOLUTION RESPIRATORY (INHALATION) at 10:37

## 2017-01-01 RX ADMIN — LACTULOSE 20 G: 20 SOLUTION ORAL at 08:42

## 2017-01-01 RX ADMIN — HYDROMORPHONE HYDROCHLORIDE 1 MG: 1 INJECTION, SOLUTION INTRAMUSCULAR; INTRAVENOUS; SUBCUTANEOUS at 17:25

## 2017-01-01 RX ADMIN — POLYETHYLENE GLYCOL 3350 17 G: 17 POWDER, FOR SOLUTION ORAL at 11:24

## 2017-01-01 RX ADMIN — HYDROMORPHONE HYDROCHLORIDE 1 MG: 1 INJECTION, SOLUTION INTRAMUSCULAR; INTRAVENOUS; SUBCUTANEOUS at 23:43

## 2017-01-01 RX ADMIN — SODIUM CHLORIDE 100 ML/HR: 9 INJECTION, SOLUTION INTRAVENOUS at 05:06

## 2017-01-01 RX ADMIN — HYDROMORPHONE HYDROCHLORIDE 0.5 MG: 1 INJECTION, SOLUTION INTRAMUSCULAR; INTRAVENOUS; SUBCUTANEOUS at 05:39

## 2017-01-01 RX ADMIN — ENOXAPARIN SODIUM 40 MG: 40 INJECTION SUBCUTANEOUS at 14:25

## 2017-01-01 RX ADMIN — LIDOCAINE HYDROCHLORIDE 60 MG: 20 INJECTION, SOLUTION INFILTRATION; PERINEURAL at 14:11

## 2017-01-01 RX ADMIN — ONDANSETRON 4 MG: 2 INJECTION INTRAMUSCULAR; INTRAVENOUS at 14:30

## 2017-01-01 RX ADMIN — HYDROMORPHONE HYDROCHLORIDE 0.25 MG: 2 INJECTION, SOLUTION INTRAMUSCULAR; INTRAVENOUS; SUBCUTANEOUS at 21:15

## 2017-01-01 RX ADMIN — MIDAZOLAM 1 MG: 1 INJECTION INTRAMUSCULAR; INTRAVENOUS at 13:11

## 2017-01-01 RX ADMIN — SCOPOLAMINE 1 PATCH: 1 PATCH, EXTENDED RELEASE TRANSDERMAL at 12:19

## 2017-01-01 RX ADMIN — HYDROMORPHONE HYDROCHLORIDE 1 MG: 2 TABLET ORAL at 12:25

## 2017-01-01 RX ADMIN — HYDROMORPHONE HYDROCHLORIDE 1 MG: 2 TABLET ORAL at 05:08

## 2017-01-01 RX ADMIN — PROMETHAZINE HYDROCHLORIDE 12.5 MG: 25 INJECTION, SOLUTION INTRAMUSCULAR; INTRAVENOUS at 09:45

## 2017-01-01 RX ADMIN — Medication 10 ML: at 20:04

## 2017-01-01 RX ADMIN — HYDROMORPHONE HYDROCHLORIDE 0.25 MG: 2 INJECTION, SOLUTION INTRAMUSCULAR; INTRAVENOUS; SUBCUTANEOUS at 14:50

## 2017-01-01 RX ADMIN — LACTULOSE 20 G: 20 SOLUTION ORAL at 17:47

## 2017-01-01 RX ADMIN — HEPARIN SODIUM (PORCINE) LOCK FLUSH IV SOLN 100 UNIT/ML 500 UNITS: 100 SOLUTION at 09:41

## 2017-01-01 RX ADMIN — Medication 10 ML: at 07:48

## 2017-01-01 RX ADMIN — SODIUM CHLORIDE, PRESERVATIVE FREE 500 UNITS: 5 INJECTION INTRAVENOUS at 07:48

## 2017-01-01 RX ADMIN — Medication 10 ML: at 09:00

## 2017-01-01 RX ADMIN — Medication 10 ML: at 08:28

## 2017-01-01 RX ADMIN — SODIUM CHLORIDE 100 ML/HR: 9 INJECTION, SOLUTION INTRAVENOUS at 02:52

## 2017-01-01 RX ADMIN — HYDROMORPHONE HYDROCHLORIDE 0.5 MG: 1 INJECTION, SOLUTION INTRAMUSCULAR; INTRAVENOUS; SUBCUTANEOUS at 06:20

## 2017-01-01 RX ADMIN — DOCUSATE SODIUM,SENNOSIDES 2 TABLET: 50; 8.6 TABLET, FILM COATED ORAL at 17:47

## 2017-01-01 RX ADMIN — HYDROMORPHONE HYDROCHLORIDE 0.5 MG: 1 INJECTION, SOLUTION INTRAMUSCULAR; INTRAVENOUS; SUBCUTANEOUS at 11:24

## 2017-01-01 RX ADMIN — DOCUSATE SODIUM -SENNOSIDES 2 TABLET: 50; 8.6 TABLET, COATED ORAL at 09:28

## 2017-01-01 RX ADMIN — HYDROMORPHONE HYDROCHLORIDE 0.5 MG: 1 INJECTION, SOLUTION INTRAMUSCULAR; INTRAVENOUS; SUBCUTANEOUS at 16:26

## 2017-01-01 RX ADMIN — SODIUM CHLORIDE 250 ML: 900 INJECTION, SOLUTION INTRAVENOUS at 13:33

## 2017-01-01 RX ADMIN — CEFAZOLIN SODIUM 2 G: 2 INJECTION, SOLUTION INTRAVENOUS at 14:15

## 2017-01-01 RX ADMIN — HYDROMORPHONE HYDROCHLORIDE 0.25 MG: 2 INJECTION, SOLUTION INTRAMUSCULAR; INTRAVENOUS; SUBCUTANEOUS at 08:36

## 2017-01-01 RX ADMIN — DOCUSATE SODIUM,SENNOSIDES 2 TABLET: 50; 8.6 TABLET, FILM COATED ORAL at 18:40

## 2017-01-01 RX ADMIN — HYDROMORPHONE HYDROCHLORIDE 0.25 MG: 2 INJECTION, SOLUTION INTRAMUSCULAR; INTRAVENOUS; SUBCUTANEOUS at 11:20

## 2017-01-01 RX ADMIN — HYDROMORPHONE HYDROCHLORIDE 0.5 MG: 1 INJECTION, SOLUTION INTRAMUSCULAR; INTRAVENOUS; SUBCUTANEOUS at 11:57

## 2017-01-01 RX ADMIN — ENOXAPARIN SODIUM 40 MG: 40 INJECTION SUBCUTANEOUS at 15:36

## 2017-01-01 RX ADMIN — TBO-FILGRASTIM 480 MCG: 480 INJECTION, SOLUTION SUBCUTANEOUS at 08:32

## 2017-01-01 RX ADMIN — LACTULOSE 20 G: 20 SOLUTION ORAL at 16:28

## 2017-01-01 RX ADMIN — HYDROMORPHONE HYDROCHLORIDE 0.5 MG: 1 INJECTION, SOLUTION INTRAMUSCULAR; INTRAVENOUS; SUBCUTANEOUS at 01:50

## 2017-01-01 RX ADMIN — SODIUM CHLORIDE 500 ML: 900 INJECTION, SOLUTION INTRAVENOUS at 11:45

## 2017-01-01 RX ADMIN — Medication 1 G: at 14:45

## 2017-01-01 RX ADMIN — SODIUM CHLORIDE 100 ML/HR: 9 INJECTION, SOLUTION INTRAVENOUS at 23:16

## 2017-01-01 RX ADMIN — LIDOCAINE HYDROCHLORIDE 17 ML: 10 INJECTION, SOLUTION INFILTRATION; PERINEURAL at 16:19

## 2017-01-01 RX ADMIN — DOCUSATE SODIUM,SENNOSIDES 2 TABLET: 50; 8.6 TABLET, FILM COATED ORAL at 08:57

## 2017-01-01 RX ADMIN — FENTANYL CITRATE 25 MCG: 50 INJECTION INTRAMUSCULAR; INTRAVENOUS at 15:32

## 2017-01-01 RX ADMIN — LIDOCAINE HYDROCHLORIDE 20 ML: 10 INJECTION, SOLUTION INFILTRATION; PERINEURAL at 13:32

## 2017-01-01 RX ADMIN — PACLITAXEL 170 MG: 100 INJECTION, POWDER, LYOPHILIZED, FOR SUSPENSION INTRAVENOUS at 14:13

## 2017-01-01 RX ADMIN — HYDROMORPHONE HYDROCHLORIDE 0.5 MG: 1 INJECTION, SOLUTION INTRAMUSCULAR; INTRAVENOUS; SUBCUTANEOUS at 14:44

## 2017-01-01 RX ADMIN — SODIUM CHLORIDE 100 ML/HR: 9 INJECTION, SOLUTION INTRAVENOUS at 12:58

## 2017-01-01 RX ADMIN — DOCUSATE SODIUM,SENNOSIDES 2 TABLET: 50; 8.6 TABLET, FILM COATED ORAL at 18:16

## 2017-01-01 RX ADMIN — Medication 10 ML: at 00:41

## 2017-01-01 RX ADMIN — GUAIFENESIN AND PSEUDOEPHEDRINE HCL 1 TABLET: 1200; 120 TABLET, EXTENDED RELEASE ORAL at 19:10

## 2017-01-01 RX ADMIN — PALONOSETRON HYDROCHLORIDE 0.25 MG: 0.25 INJECTION INTRAVENOUS at 15:11

## 2017-01-01 RX ADMIN — HYDROMORPHONE HYDROCHLORIDE 0.5 MG: 1 INJECTION, SOLUTION INTRAMUSCULAR; INTRAVENOUS; SUBCUTANEOUS at 08:17

## 2017-01-01 RX ADMIN — HYDROMORPHONE HYDROCHLORIDE 1 MG: 2 TABLET ORAL at 08:45

## 2017-01-01 RX ADMIN — DIATRIZOATE MEGLUMINE AND DIATRIZOATE SODIUM 30 ML: 660; 100 LIQUID ORAL; RECTAL at 13:50

## 2017-01-01 RX ADMIN — Medication 490 MG: at 15:07

## 2017-01-01 RX ADMIN — Medication 10 ML: at 21:24

## 2017-01-01 RX ADMIN — Medication 10 ML: at 09:40

## 2017-01-01 RX ADMIN — SODIUM CHLORIDE 250 ML: 900 INJECTION, SOLUTION INTRAVENOUS at 14:44

## 2017-01-01 RX ADMIN — SODIUM CHLORIDE 1700 MG: 900 INJECTION, SOLUTION INTRAVENOUS at 09:34

## 2017-01-01 RX ADMIN — HYDROMORPHONE HYDROCHLORIDE 1 MG: 1 INJECTION, SOLUTION INTRAMUSCULAR; INTRAVENOUS; SUBCUTANEOUS at 13:58

## 2017-01-01 RX ADMIN — PACLITAXEL 170 MG: 100 INJECTION, POWDER, LYOPHILIZED, FOR SUSPENSION INTRAVENOUS at 15:02

## 2017-01-01 RX ADMIN — PREDNISONE 10 MG: 10 TABLET ORAL at 09:28

## 2017-01-01 RX ADMIN — ONDANSETRON 8 MG: 4 TABLET, FILM COATED ORAL at 06:31

## 2017-01-01 RX ADMIN — BENZONATATE 100 MG: 100 CAPSULE ORAL at 16:21

## 2017-01-01 RX ADMIN — OXYCODONE HYDROCHLORIDE AND ACETAMINOPHEN 1 TABLET: 5; 325 TABLET ORAL at 11:43

## 2017-01-01 RX ADMIN — HYDROMORPHONE HYDROCHLORIDE 0.5 MG: 1 INJECTION, SOLUTION INTRAMUSCULAR; INTRAVENOUS; SUBCUTANEOUS at 09:03

## 2017-01-01 RX ADMIN — DEXAMETHASONE SODIUM PHOSPHATE 12 MG: 4 INJECTION, SOLUTION INTRAMUSCULAR; INTRAVENOUS at 13:56

## 2017-01-01 RX ADMIN — HYDROMORPHONE HYDROCHLORIDE 0.5 MG: 1 INJECTION, SOLUTION INTRAMUSCULAR; INTRAVENOUS; SUBCUTANEOUS at 10:59

## 2017-01-01 RX ADMIN — DOCUSATE SODIUM,SENNOSIDES 2 TABLET: 50; 8.6 TABLET, FILM COATED ORAL at 17:42

## 2017-01-01 RX ADMIN — ENOXAPARIN SODIUM 40 MG: 40 INJECTION SUBCUTANEOUS at 14:18

## 2017-01-01 RX ADMIN — HYDROMORPHONE HYDROCHLORIDE 1 MG: 1 INJECTION, SOLUTION INTRAMUSCULAR; INTRAVENOUS; SUBCUTANEOUS at 05:17

## 2017-01-01 RX ADMIN — DOCUSATE SODIUM,SENNOSIDES 2 TABLET: 50; 8.6 TABLET, FILM COATED ORAL at 18:32

## 2017-01-01 RX ADMIN — HYDROMORPHONE HYDROCHLORIDE 0.25 MG: 2 INJECTION, SOLUTION INTRAMUSCULAR; INTRAVENOUS; SUBCUTANEOUS at 18:22

## 2017-01-01 RX ADMIN — DIATRIZOATE MEGLUMINE AND DIATRIZOATE SODIUM 30 ML: 600; 100 SOLUTION ORAL; RECTAL at 11:47

## 2017-01-01 RX ADMIN — GUAIFENESIN AND PSEUDOEPHEDRINE HCL 1 TABLET: 1200; 120 TABLET, EXTENDED RELEASE ORAL at 18:16

## 2017-01-01 RX ADMIN — HYDROMORPHONE HYDROCHLORIDE 1 MG: 1 INJECTION, SOLUTION INTRAMUSCULAR; INTRAVENOUS; SUBCUTANEOUS at 23:24

## 2017-01-01 RX ADMIN — LACTULOSE 20 G: 20 SOLUTION ORAL at 10:49

## 2017-01-01 RX ADMIN — Medication 1 G: at 08:44

## 2017-01-01 RX ADMIN — HYDROMORPHONE HYDROCHLORIDE 0.25 MG: 2 INJECTION, SOLUTION INTRAMUSCULAR; INTRAVENOUS; SUBCUTANEOUS at 03:01

## 2017-01-01 RX ADMIN — MULTIPLE VITAMINS W/ MINERALS TAB 1 TABLET: TAB at 08:42

## 2017-01-01 RX ADMIN — LORAZEPAM 0.5 MG: 2 INJECTION INTRAMUSCULAR; INTRAVENOUS at 06:06

## 2017-01-01 RX ADMIN — Medication 370 MG: at 10:41

## 2017-01-01 RX ADMIN — Medication 20 ML: at 10:03

## 2017-01-01 RX ADMIN — PANTOPRAZOLE SODIUM 40 MG: 40 TABLET, DELAYED RELEASE ORAL at 21:27

## 2017-01-01 RX ADMIN — GUAIFENESIN AND PSEUDOEPHEDRINE HCL 1 TABLET: 1200; 120 TABLET, EXTENDED RELEASE ORAL at 19:23

## 2017-01-01 RX ADMIN — LORAZEPAM 0.25 MG: 2 INJECTION INTRAMUSCULAR; INTRAVENOUS at 19:57

## 2017-01-01 RX ADMIN — HYDROMORPHONE HYDROCHLORIDE 1 MG: 1 INJECTION, SOLUTION INTRAMUSCULAR; INTRAVENOUS; SUBCUTANEOUS at 13:55

## 2017-01-01 RX ADMIN — FENTANYL CITRATE 25 MCG: 50 INJECTION INTRAMUSCULAR; INTRAVENOUS at 15:15

## 2017-01-01 RX ADMIN — HYDROMORPHONE HYDROCHLORIDE 0.5 MG: 1 INJECTION, SOLUTION INTRAMUSCULAR; INTRAVENOUS; SUBCUTANEOUS at 16:33

## 2017-01-01 RX ADMIN — HYDROMORPHONE HYDROCHLORIDE 0.25 MG: 1 INJECTION, SOLUTION INTRAMUSCULAR; INTRAVENOUS; SUBCUTANEOUS at 15:48

## 2017-01-01 RX ADMIN — LORAZEPAM 0.5 MG: 2 INJECTION INTRAMUSCULAR; INTRAVENOUS at 12:01

## 2017-01-01 RX ADMIN — MORPHINE SULFATE 10 MG: 100 SOLUTION ORAL at 13:00

## 2017-01-01 RX ADMIN — HYDROMORPHONE HYDROCHLORIDE 1 MG: 1 INJECTION, SOLUTION INTRAMUSCULAR; INTRAVENOUS; SUBCUTANEOUS at 12:09

## 2017-01-01 RX ADMIN — ACETAMINOPHEN 650 MG: 325 TABLET ORAL at 09:31

## 2017-01-01 RX ADMIN — HYDROMORPHONE HYDROCHLORIDE 0.5 MG: 1 INJECTION, SOLUTION INTRAMUSCULAR; INTRAVENOUS; SUBCUTANEOUS at 02:13

## 2017-01-01 RX ADMIN — IOPAMIDOL 85 ML: 612 INJECTION, SOLUTION INTRAVENOUS at 14:54

## 2017-01-01 RX ADMIN — HYDROMORPHONE HYDROCHLORIDE 0.5 MG: 1 INJECTION, SOLUTION INTRAMUSCULAR; INTRAVENOUS; SUBCUTANEOUS at 12:00

## 2017-01-01 RX ADMIN — DEXAMETHASONE SODIUM PHOSPHATE 12 MG: 10 INJECTION INTRAMUSCULAR; INTRAVENOUS at 14:38

## 2017-01-01 RX ADMIN — HYDROMORPHONE HYDROCHLORIDE 0.25 MG: 2 INJECTION, SOLUTION INTRAMUSCULAR; INTRAVENOUS; SUBCUTANEOUS at 14:18

## 2017-01-01 RX ADMIN — Medication 1 G: at 12:58

## 2017-01-01 RX ADMIN — FENTANYL CITRATE 25 MCG: 50 INJECTION INTRAMUSCULAR; INTRAVENOUS at 15:40

## 2017-01-01 RX ADMIN — LORAZEPAM 0.5 MG: 2 INJECTION INTRAMUSCULAR; INTRAVENOUS at 01:50

## 2017-01-01 RX ADMIN — HYDROMORPHONE HYDROCHLORIDE 0.5 MG: 1 INJECTION, SOLUTION INTRAMUSCULAR; INTRAVENOUS; SUBCUTANEOUS at 04:33

## 2017-01-01 RX ADMIN — Medication 10 ML: at 20:08

## 2017-01-01 RX ADMIN — HYDROMORPHONE HYDROCHLORIDE 1 MG: 1 INJECTION, SOLUTION INTRAMUSCULAR; INTRAVENOUS; SUBCUTANEOUS at 14:44

## 2017-01-01 RX ADMIN — LACTULOSE 20 G: 20 SOLUTION ORAL at 08:39

## 2017-01-01 RX ADMIN — IOPAMIDOL 75 ML: 612 INJECTION, SOLUTION INTRAVENOUS at 13:30

## 2017-01-01 RX ADMIN — GUAIFENESIN AND PSEUDOEPHEDRINE HCL 1 TABLET: 1200; 120 TABLET, EXTENDED RELEASE ORAL at 07:02

## 2017-01-01 RX ADMIN — LIDOCAINE HYDROCHLORIDE 20 ML: 10 INJECTION, SOLUTION INFILTRATION; PERINEURAL at 14:38

## 2017-01-01 RX ADMIN — VINORELBINE TARTRATE 50 MG: 10 INJECTION INTRAVENOUS at 13:21

## 2017-01-01 RX ADMIN — IOPAMIDOL 95 ML: 612 INJECTION, SOLUTION INTRAVENOUS at 08:15

## 2017-01-01 RX ADMIN — HYDROMORPHONE HYDROCHLORIDE 1 MG: 1 INJECTION, SOLUTION INTRAMUSCULAR; INTRAVENOUS; SUBCUTANEOUS at 17:50

## 2017-01-01 RX ADMIN — HYDROMORPHONE HYDROCHLORIDE 1 MG: 2 TABLET ORAL at 15:07

## 2017-01-01 RX ADMIN — GUAIFENESIN AND PSEUDOEPHEDRINE HCL 1 TABLET: 1200; 120 TABLET, EXTENDED RELEASE ORAL at 00:14

## 2017-01-01 RX ADMIN — ALTEPLASE 2 MG: 2.2 INJECTION, POWDER, LYOPHILIZED, FOR SOLUTION INTRAVENOUS at 16:11

## 2017-01-01 RX ADMIN — LORAZEPAM 0.5 MG: 2 INJECTION INTRAMUSCULAR; INTRAVENOUS at 20:55

## 2017-01-01 RX ADMIN — PANTOPRAZOLE SODIUM 40 MG: 40 TABLET, DELAYED RELEASE ORAL at 20:01

## 2017-01-01 RX ADMIN — SODIUM CHLORIDE 100 ML/HR: 9 INJECTION, SOLUTION INTRAVENOUS at 14:50

## 2017-01-01 RX ADMIN — HYDROMORPHONE HYDROCHLORIDE 1 MG: 1 INJECTION, SOLUTION INTRAMUSCULAR; INTRAVENOUS; SUBCUTANEOUS at 08:23

## 2017-01-01 RX ADMIN — Medication 1 G: at 08:20

## 2017-01-01 RX ADMIN — BENZONATATE 100 MG: 100 CAPSULE ORAL at 17:52

## 2017-01-01 RX ADMIN — DEXAMETHASONE SODIUM PHOSPHATE 12 MG: 10 INJECTION INTRAMUSCULAR; INTRAVENOUS at 09:11

## 2017-01-01 RX ADMIN — SODIUM CHLORIDE 500 ML: 900 INJECTION, SOLUTION INTRAVENOUS at 10:17

## 2017-01-01 RX ADMIN — DOCUSATE SODIUM,SENNOSIDES 2 TABLET: 50; 8.6 TABLET, FILM COATED ORAL at 08:20

## 2017-01-01 RX ADMIN — DOCUSATE SODIUM,SENNOSIDES 2 TABLET: 50; 8.6 TABLET, FILM COATED ORAL at 12:29

## 2017-01-01 RX ADMIN — SODIUM CHLORIDE 100 ML/HR: 9 INJECTION, SOLUTION INTRAVENOUS at 17:51

## 2017-01-01 RX ADMIN — Medication 10 ML: at 08:54

## 2017-01-01 RX ADMIN — METOPROLOL TARTRATE 25 MG: 25 TABLET ORAL at 14:01

## 2017-01-01 RX ADMIN — SODIUM CHLORIDE 100 ML/HR: 9 INJECTION, SOLUTION INTRAVENOUS at 09:46

## 2017-01-01 RX ADMIN — SODIUM CHLORIDE 250 ML: 900 INJECTION, SOLUTION INTRAVENOUS at 10:47

## 2017-01-01 RX ADMIN — HYDROMORPHONE HYDROCHLORIDE 1 MG: 1 INJECTION, SOLUTION INTRAMUSCULAR; INTRAVENOUS; SUBCUTANEOUS at 07:38

## 2017-01-01 RX ADMIN — HYDROMORPHONE HYDROCHLORIDE 1 MG: 1 INJECTION, SOLUTION INTRAMUSCULAR; INTRAVENOUS; SUBCUTANEOUS at 18:55

## 2017-01-01 RX ADMIN — SODIUM CHLORIDE 250 ML: 900 INJECTION, SOLUTION INTRAVENOUS at 14:29

## 2017-01-01 RX ADMIN — BISACODYL 5 MG: 5 TABLET, COATED ORAL at 04:51

## 2017-01-01 RX ADMIN — HYDROMORPHONE HYDROCHLORIDE 0.5 MG: 1 INJECTION, SOLUTION INTRAMUSCULAR; INTRAVENOUS; SUBCUTANEOUS at 00:11

## 2017-01-01 RX ADMIN — ENOXAPARIN SODIUM 40 MG: 40 INJECTION SUBCUTANEOUS at 21:48

## 2017-01-01 RX ADMIN — VINORELBINE TARTRATE 50 MG: 10 INJECTION INTRAVENOUS at 14:45

## 2017-01-01 RX ADMIN — Medication 10 ML: at 09:54

## 2017-01-01 RX ADMIN — DIAZEPAM 5 MG: 5 INJECTION, SOLUTION INTRAMUSCULAR; INTRAVENOUS at 20:23

## 2017-01-01 RX ADMIN — SODIUM CHLORIDE 100 ML/HR: 9 INJECTION, SOLUTION INTRAVENOUS at 16:27

## 2017-01-01 RX ADMIN — Medication 1 G: at 17:47

## 2017-01-01 RX ADMIN — LACTULOSE 30 ML: 20 SOLUTION ORAL at 20:08

## 2017-01-01 RX ADMIN — PACLITAXEL 170 MG: 100 INJECTION, POWDER, LYOPHILIZED, FOR SUSPENSION INTRAVENOUS at 13:53

## 2017-01-01 RX ADMIN — HYDROMORPHONE HYDROCHLORIDE 0.5 MG: 1 INJECTION, SOLUTION INTRAMUSCULAR; INTRAVENOUS; SUBCUTANEOUS at 19:21

## 2017-01-01 RX ADMIN — SODIUM CHLORIDE, PRESERVATIVE FREE 500 UNITS: 5 INJECTION INTRAVENOUS at 09:55

## 2017-01-01 RX ADMIN — FAMOTIDINE 20 MG: 10 INJECTION, SOLUTION INTRAVENOUS at 13:11

## 2017-01-01 RX ADMIN — HEPARIN SODIUM (PORCINE) LOCK FLUSH IV SOLN 100 UNIT/ML 500 UNITS: 100 SOLUTION at 08:33

## 2017-01-01 RX ADMIN — BENZONATATE 100 MG: 100 CAPSULE ORAL at 08:51

## 2017-01-01 RX ADMIN — DEXAMETHASONE SODIUM PHOSPHATE 4 MG: 10 INJECTION INTRAMUSCULAR; INTRAVENOUS at 23:34

## 2017-01-01 RX ADMIN — SODIUM CHLORIDE, POTASSIUM CHLORIDE, SODIUM LACTATE AND CALCIUM CHLORIDE 9 ML/HR: 600; 310; 30; 20 INJECTION, SOLUTION INTRAVENOUS at 13:07

## 2017-01-01 RX ADMIN — HYDROMORPHONE HYDROCHLORIDE 0.5 MG: 1 INJECTION, SOLUTION INTRAMUSCULAR; INTRAVENOUS; SUBCUTANEOUS at 20:04

## 2017-01-01 RX ADMIN — Medication 320 MG: at 13:40

## 2017-01-01 RX ADMIN — GRANISETRON HYDROCHLORIDE 1 MG: 1 INJECTION INTRAVENOUS at 11:45

## 2017-01-01 RX ADMIN — ENOXAPARIN SODIUM 40 MG: 40 INJECTION SUBCUTANEOUS at 15:07

## 2017-01-01 RX ADMIN — HYDROMORPHONE HYDROCHLORIDE 1 MG: 1 INJECTION, SOLUTION INTRAMUSCULAR; INTRAVENOUS; SUBCUTANEOUS at 16:12

## 2017-01-01 RX ADMIN — SODIUM CHLORIDE 250 ML: 900 INJECTION, SOLUTION INTRAVENOUS at 13:23

## 2017-01-01 RX ADMIN — HYDROMORPHONE HYDROCHLORIDE 0.25 MG: 2 INJECTION, SOLUTION INTRAMUSCULAR; INTRAVENOUS; SUBCUTANEOUS at 07:03

## 2017-01-01 RX ADMIN — METOPROLOL TARTRATE 25 MG: 25 TABLET ORAL at 21:26

## 2017-01-01 RX ADMIN — Medication 1 G: at 08:42

## 2017-01-01 RX ADMIN — HYDROMORPHONE HYDROCHLORIDE 1 MG: 1 INJECTION, SOLUTION INTRAMUSCULAR; INTRAVENOUS; SUBCUTANEOUS at 21:23

## 2017-01-01 RX ADMIN — HYDROMORPHONE HYDROCHLORIDE 0.5 MG: 1 INJECTION, SOLUTION INTRAMUSCULAR; INTRAVENOUS; SUBCUTANEOUS at 03:42

## 2017-01-01 RX ADMIN — MAGNESIUM HYDROXIDE 10 ML: 2400 SUSPENSION ORAL at 08:37

## 2017-01-01 RX ADMIN — FENTANYL CITRATE 25 MCG: 50 INJECTION INTRAMUSCULAR; INTRAVENOUS at 15:27

## 2017-01-01 RX ADMIN — DOCUSATE SODIUM,SENNOSIDES 2 TABLET: 50; 8.6 TABLET, FILM COATED ORAL at 17:06

## 2017-01-01 RX ADMIN — HYDROMORPHONE HYDROCHLORIDE 0.5 MG: 1 INJECTION, SOLUTION INTRAMUSCULAR; INTRAVENOUS; SUBCUTANEOUS at 09:18

## 2017-01-01 RX ADMIN — SODIUM CHLORIDE 100 ML/HR: 9 INJECTION, SOLUTION INTRAVENOUS at 19:21

## 2017-01-01 RX ADMIN — SODIUM CHLORIDE 250 ML: 900 INJECTION, SOLUTION INTRAVENOUS at 11:06

## 2017-01-01 RX ADMIN — PACLITAXEL 170 MG: 100 INJECTION, POWDER, LYOPHILIZED, FOR SUSPENSION INTRAVENOUS at 11:12

## 2017-01-01 RX ADMIN — DOCUSATE SODIUM,SENNOSIDES 2 TABLET: 50; 8.6 TABLET, FILM COATED ORAL at 17:52

## 2017-01-01 RX ADMIN — SODIUM CHLORIDE, PRESERVATIVE FREE 500 UNITS: 5 INJECTION INTRAVENOUS at 14:12

## 2017-01-01 RX ADMIN — SODIUM CHLORIDE 250 ML: 9 INJECTION, SOLUTION INTRAVENOUS at 13:56

## 2017-01-01 RX ADMIN — LACTULOSE 20 G: 20 SOLUTION ORAL at 11:33

## 2017-01-01 RX ADMIN — Medication 1 G: at 16:37

## 2017-01-01 RX ADMIN — PACLITAXEL 170 MG: 100 INJECTION, POWDER, LYOPHILIZED, FOR SUSPENSION INTRAVENOUS at 14:22

## 2017-01-01 RX ADMIN — LACTULOSE 20 G: 20 SOLUTION ORAL at 20:29

## 2017-01-01 RX ADMIN — HYDROMORPHONE HYDROCHLORIDE 0.5 MG: 1 INJECTION, SOLUTION INTRAMUSCULAR; INTRAVENOUS; SUBCUTANEOUS at 06:59

## 2017-01-01 RX ADMIN — PROMETHAZINE HYDROCHLORIDE 12.5 MG: 12.5 TABLET ORAL at 06:34

## 2017-01-01 RX ADMIN — PACLITAXEL 170 MG: 100 INJECTION, POWDER, LYOPHILIZED, FOR SUSPENSION INTRAVENOUS at 11:19

## 2017-01-01 RX ADMIN — SODIUM CHLORIDE 1700 MG: 900 INJECTION, SOLUTION INTRAVENOUS at 11:24

## 2017-01-01 RX ADMIN — LACTULOSE 20 G: 20 SOLUTION ORAL at 11:17

## 2017-01-01 RX ADMIN — PHENYLEPHRINE HYDROCHLORIDE 100 MCG: 10 INJECTION INTRAVENOUS at 14:20

## 2017-01-01 RX ADMIN — MAGNESIUM HYDROXIDE 10 ML: 2400 SUSPENSION ORAL at 18:25

## 2017-01-01 RX ADMIN — SODIUM CHLORIDE 100 ML/HR: 9 INJECTION, SOLUTION INTRAVENOUS at 02:06

## 2017-01-01 RX ADMIN — OXYCODONE HYDROCHLORIDE AND ACETAMINOPHEN 1 TABLET: 5; 325 TABLET ORAL at 15:23

## 2017-01-01 RX ADMIN — MULTIPLE VITAMINS W/ MINERALS TAB 1 TABLET: TAB at 08:44

## 2017-01-01 RX ADMIN — HYDROMORPHONE HYDROCHLORIDE 1 MG: 1 INJECTION, SOLUTION INTRAMUSCULAR; INTRAVENOUS; SUBCUTANEOUS at 11:11

## 2017-01-01 RX ADMIN — HYDROMORPHONE HYDROCHLORIDE 0.5 MG: 1 INJECTION, SOLUTION INTRAMUSCULAR; INTRAVENOUS; SUBCUTANEOUS at 14:19

## 2017-01-01 RX ADMIN — HYDROMORPHONE HYDROCHLORIDE 0.5 MG: 1 INJECTION, SOLUTION INTRAMUSCULAR; INTRAVENOUS; SUBCUTANEOUS at 22:35

## 2017-01-01 RX ADMIN — DEXAMETHASONE SODIUM PHOSPHATE 12 MG: 10 INJECTION INTRAMUSCULAR; INTRAVENOUS at 13:32

## 2017-01-01 RX ADMIN — HYDROMORPHONE HYDROCHLORIDE 1 MG: 1 INJECTION, SOLUTION INTRAMUSCULAR; INTRAVENOUS; SUBCUTANEOUS at 16:20

## 2017-01-01 RX ADMIN — HYDROMORPHONE HYDROCHLORIDE 1 MG: 1 INJECTION, SOLUTION INTRAMUSCULAR; INTRAVENOUS; SUBCUTANEOUS at 20:03

## 2017-01-01 RX ADMIN — LORAZEPAM 0.5 MG: 2 INJECTION INTRAMUSCULAR; INTRAVENOUS at 12:33

## 2017-01-01 RX ADMIN — DOCUSATE SODIUM,SENNOSIDES 2 TABLET: 50; 8.6 TABLET, FILM COATED ORAL at 08:44

## 2017-01-01 RX ADMIN — HYDROMORPHONE HYDROCHLORIDE 0.25 MG: 1 INJECTION, SOLUTION INTRAMUSCULAR; INTRAVENOUS; SUBCUTANEOUS at 15:41

## 2017-01-01 RX ADMIN — HYDROMORPHONE HYDROCHLORIDE 0.5 MG: 1 INJECTION, SOLUTION INTRAMUSCULAR; INTRAVENOUS; SUBCUTANEOUS at 14:16

## 2017-01-01 RX ADMIN — SODIUM CHLORIDE 250 ML: 900 INJECTION, SOLUTION INTRAVENOUS at 09:09

## 2017-01-01 RX ADMIN — Medication 1 G: at 10:49

## 2017-01-01 RX ADMIN — DEXAMETHASONE SODIUM PHOSPHATE 4 MG: 10 INJECTION INTRAMUSCULAR; INTRAVENOUS at 05:40

## 2017-01-01 RX ADMIN — BENZONATATE 100 MG: 100 CAPSULE ORAL at 05:08

## 2017-01-03 NOTE — PROGRESS NOTES
Subjective   REASONS FOR FOLLOWUP:   1. T3N2M1,  ER/VA negative, HER-2 positive breast cancer.   2. Biopsy-proven isolated   liver metastasis. Plan to do aggressive chemotherapy with TCH/Perjeta and reevaluate the liver after 3 cycles and continue 6 cycles of chemotherapy followed by Perjeta, Herceptin indefinitely as long as there is no recurrence.   3. Excellent response to 2 cycles of TCH/Perjeta.   4. Day 8 Herceptin missed cycle 2 because of anal fissure which needed to be operated on.   5. Near complete resolution with 4 cycles of TCH/Perjeta.   6. No further improvement, but no visible tumor in the chest and stable, barely detectable nodu le in the liver. Perjeta, Herceptin by themselves continued for now, with plans for surgery and radiation.   7. Negative PET scan after 6 cycles of TCH/Perjeta. Surgery on the left breast and axilla planned.   8. At the time of surgery, 12/31/2014, the patient h ad [id number] disease. The decision was made to do radiation to the axilla and supraclavicular areas and the liver lesion. Continue Perjeta and Herceptin indefinitely until there are signs of progression.   9. Skin lesion in left chest wall positive breast canc er. CT scans of chest, abdomen and pelvis showed no new disease on 02/10/2015. Radiation to the chest wall. Herceptin and Perjeta to continue until there is clear progression.   10. Stereotactic day radiation to solitary brain lesion completed. The patient remains on a Decadron taper.   11. Isolated brain met, irradiated on 07/10/2015 with focused radiation.    12.  Progressive pulmonary metastasis and left supraclavicular nodes and brain met on 8/9/2016 switched to Kadcyla                   13.  Progressive disease in lymph nodes on Kadcyla switched to Tykerb and Xeloda in 1/17    History of Present Illness  patient is a 56 yo female with metastatic HER-2 positive breast cancer ER/VA negative currently on Kadcyla.  She is tolerating the treatment well and  feels well but noticed a small lymph node in the left supraclavicular area which is increasing in size since her last treatment this led to repeat scans and she is here today to review this she has no headache or neurological complaints at this time and except for mild tenderness in the neck node and in the retrosternal area she feels well.  Unfortunately as we expected her CAT scans show progressive disease in the left supraclavicular area and mediastinum as well as some small pulmonary nodules and no disease in the abdomen.  Her brain MRI also shows increase in size of the solitary lesion in the left temporal occipital junction since October with some surrounding edema which is very asymptomatic from.      At this point clearly we need a change in systemic treatment and some local treatment for her solitary brain met.  Refer to radiation therapy for focused radiation and will switch to Tykerb and Xeloda.  Because of her borderline blood counts I have started was 5 pills of Xeloda daily 2 weeks on and 2 weeks off plus the Tykerb and she will have chemotherapy education for this next couple of days.  She will start the Tykerb next week and hold off on the Xeloda until her counts are better and the radiation is completed in 2 weeks    She feels well today.  She denies shortness of breath, fevers, pain.  She is eating well.    PAST MEDICAL HISTORY: Negative except for anemia intermittently which responded to iron.     PAST SURGICAL HISTORY: None.     OB-GYN HISTORY: Menarche at age 15, menopause .  2, para 2. First childbirth was at age 31. She breast-fed her first child for 4 months, but not her second one. She has not been on hormone replacement.     HEMATOLOGIC/ONCOLOGIC HISTORY:    The patient is a 54-year-old  with Reasult who noted a mass in her right breast recently and immediately realized that it is probably a malignancy and went to see her family doctor who sent her for a mammogram  and ultrasound. Mammogram and ultrasound confirmed an irregular mass in the 9 o'clock position of the left breast with an additional smaller area a mass in the subareolar tissue. Left breast ultrasound revealed 2.3 x 1.9 cm solid mass at the 9 o'clock position and a solid mass in the subareolar region, measuring 6 x 5 x 5, and also an enlarged lymph node measuring 2.3 x 2 in the left axilla. The patient underwent FNA of all three lesions and the pathology report showed the two lesions in the breast to be high grade invasive ductal car cinoma, grade 3, estrogen/progesterone receptors negative, HER-2 positivity was reported but I am not sure if both lesions were tested. Axillary lymph node cytology was positive for malignant cells.   The patient was seen on 07/03/2014 with a normal bone sc an and echo which shows EF of 50% although it looked visually higher. CT scans of the chest, abdomen, and pelvis unfortunately showed the obvious breast mass in the left breast with enlarged lymph nodes in the axilla. In addition, there was a 6 mm pleural based nodule of the left lung base and a 1.5 cm low attenuation lesion in the hepatic segment, suspicious for metastatic disease. The decision was made to do a PET scan and biopsy of the liver lesion and tailor therapy according to the results. We discu ssed also that if she has an isolated metastasis in the liver, we would consider being aggressive and proceeding with treatment, as previously outlined, followed by a response of targeted therapy at the liver at the conclusion of treatment.   Liver biopsy s howed metastatic breast cancer and based on the fact this was an isolated metastasis we presented her at conference and opted to go for aggressive treatment with TCH/Perjeta, re-evaluate the liver lesion and if there is a complete response to continue wi th Arimidex, Perjeta and Herceptin as long as she is tolerating it well.   The patient had significant diarrhea toxicity with  cycle 1. Neulasta added for cycle 2 with IV fluid support on day 5 and scans planned after cycle 2. Hemorrhoids being addressed by Dr. Molly Lopez.   The patient was seen on 08/21/2014 with scans that showed dramatic improvement in the primary breast tumor and the left axillary node and subpectoral nodes and the isolated liver metastasis, which is 50% smaller. Plan to continue for 6 do ses with followup scans and local therapy to the liver and breast if tolerated.   The patient was seen on 10/01/2014 with CAT scans that showed almost complete resolution of her single liver metastasis and is now 4 mm in size. The breast mass was not commen lauren on, suggesting that this is also essentially unremarkable and no significant axillary adenopathy appreciated. The decision was made to finish 6 cycles of TCH/Perjeta and have surgery with left mastectomy and dissection followed by focus radiation to t he liver lesion and chest with Herceptin and Perjeta continued in a treatment setting.   Patient seen through triage in the infusion center on 11/06/2014 with reports of fever. Blood cultures obtained, urinalysis obtained and chest x-ray revealed negative results. She was started on Augmentin 875 mg twice daily for 7 days.   Patient seen on 11/13/2014 after 6 cycles of TCH/Perjeta. There is no tumor visible on the chest wall, breasts, axillae or supraclavicular areas. There is a 6 mm lung nodule, which is unc hanged and stable from pretreatment and very likely benign. The liver lesion is just barely perceptible and unchanged from the last scan. Decision was made to continue with Perjeta and Herceptin by themselves and evaluate for surgery and radiation therea fter. Possible directive therapy to the liver if there has been a complete pathological response in the chest and axilla.   Patient seen on 01/20/2015 with pathology report from her surgery showing just residual ductal carcinoma in situ with no invasive felix or left in the  breast but 2 of 3 sentinel nodes positive for metastatic disease 2 mm deposits including the node with the clip in it. Decision was made to radiate the chest wall and liver lesion and continue Perjeta/Herceptin.   The patient was seen on 0 2/10/2015 with CT scans of chest, abdomen and pelvis which showed no evidence of progressive disease. There is a stable 6 mm lung nodule that is unchanged and a stable site of metastasis in the liver which is unchanged and no new evidence of metastatic d isease. Biopsy of the chest wall lesion showed breast cancer with HER-2 positivity. The decision was made, because there is no other progression outside the local area, to continue with radiation to the chest wall, including axillary, subpectoral, supracl a vicular nodes and the skin lesion, and continue Perjeta and Herceptin until there is clinical progression outside the chest wall. We do realize at this point that there is rapid progression in the chest wall and it is very unlikely she is going to be cure d from her disease.   The patient was seen on 05/05/2015. Echocardiogram was stable. PET scan shows consolidation of the left upper lobe at the site of her radiation, which consists of radiation pneumonitis. There is a very low level activity in the chest wall, probably corresponding to her skin metastasis in the radiated field and the liver metastasis has resolved. The decision was made to continue with Herceptin/Perjeta for the time being with repeat scans in 4 months.   The patient had an MRI dated 06/18/ 2015 which showed an isolated 1 cm metastasis in the left inferior temporal gyrus. Focused radiation was given with good results. Patient seen on 07/28/2015 clinically doing well. She had a viral GI infection 2 days prior which has resolved with some re sultant mild leukopenia and thrombocytopenia. Echo showed ejection fraction of 52% but it was done at another facility and we will have it reviewed and continue with  treatment for now, with scans planned in 3 weeks.   The patient was seen on 08/18/2015 wit h CT scans of chest, abdomen, and pelvis that show essentially stable disease with no new metastasis and some small nodules in the chest which are unchanged and no liver metastasis. Perjeta and Herceptin continue with plans for bone scan and MRI of the b rain to follow up on a brain in 3 weeks and mammogram and ultrasound ordered for some minimal swelling of the breast with no obvious masses.   Patient seen on 09/08/2015 with negative bone scan and MRI showing further resolution of her isolated brain met. Th e decision was made to continue on current treatment for the time being and follow up echoes every 4 months. Borderline iron depletion with good improvement in symptoms with Feraheme.   Patient seen on 12/22/2015 with a brain MRI that shows 2 stable resid ual small lesions, post radiation in her brain. CT abdomen and pelvis and chest CT are negative for metastatic disease or obvious bony lesions. Perjeta/Herceptin continued with plans for echocardiograms every 4 months.    Patient seen on 02/02/2016, clinically stable. Echocardiogram dated 01/07/2016 shows an EF of 62%. Patient is having slow recovery from upper respiratory infection and we have given her a steroid pack and continued treatment, as she had scans just a month ago that were very good.       SOCIAL HISTORY: She is  and lives with her . She works as a  for [employer]. She does not smoke or drink. She has no risk factors for HIV or drug history.     FAMILY HISTORY: Parents are both alive, father at 95 years and mother at 81 years. She has two sisters and one half-brother. Maternal grandmother had uterine cancer in 50s. No breast or ovarian cancer in the family.     Review of Systems   Constitutional: Negative for activity change, fatigue and fever.   HENT: Negative for mouth sores and nosebleeds.    Eyes: Negative for visual disturbance.  "  Respiratory: Negative for chest tightness and shortness of breath.    Cardiovascular: Negative for palpitations and leg swelling.   Gastrointestinal: Negative for constipation, diarrhea and nausea.   Genitourinary: Negative for difficulty urinating.   Musculoskeletal: Negative for arthralgias.   Skin: Negative for color change and rash.   Neurological: Negative for weakness and light-headedness.   Psychiatric/Behavioral: The patient is not nervous/anxious.    All other systems reviewed and are negative.         Current Outpatient Prescriptions on File Prior to Visit   Medication Sig Dispense Refill   • B COMPLEX VITAMINS PO Take  by mouth daily.     • Cholecalciferol (VITAMIN D PO) Take  by mouth.     • Multiple Vitamins-Minerals (MULTIVITAMIN PO) Take  by mouth daily.       No current facility-administered medications on file prior to visit.          ALLERGIES:    Allergies   Allergen Reactions   • Latex        Objective      Vitals:    01/03/17 0751   BP: 114/68   Pulse: 86   Resp: 16   Temp: 98.1 °F (36.7 °C)   TempSrc: Oral   SpO2: 100%   Weight: 134 lb 12.8 oz (61.1 kg)   Height: 67\" (170.2 cm)   PainSc: 0-No pain     Current Status 1/3/2017   ECOG score 0       Physical Exam    GENERAL:  Well-developed, well-nourished in no acute distress.   SKIN:  Warm, dry without rashes, purpura or petechiae.  EYES:  Pupils equal, round and reactive to light.  EOMs intact.  Conjunctivae normal.  EARS:  Hearing intact.  NOSE:  Septum midline.  No excoriations or nasal discharge.  MOUTH:  Tongue is well-papillated; no stomatitis or ulcers.  Lips normal.  THROAT:  Oropharynx without lesions or exudates.  NECK:  Supple with good range of motion; no thyromegaly or masses, no JVD. 1x1cm left supraclavicular node felt  LYMPHATICS:  No cervical, supraclavicular, axillary or inguinal adenopathy.  CHEST:  Lungs clear to auscultation. Good airflow.  CARDIAC:  Regular rate and rhythm without murmurs, rubs or gallops. Normal " S1,S2.  ABDOMEN:  Soft, nontender with no hepatosplenomegaly or masses.  EXTREMITIES:  No clubbing, cyanosis or edema.  NEUROLOGICAL:  Cranial Nerves II-XII grossly intact.  No focal neurological deficits.  PSYCHIATRIC:  Normal affect and mood.            RECENT LABS:  Hematology WBC   Date Value Ref Range Status   01/03/2017 3.01 (L) 4.00 - 10.00 10*3/mm3 Final   12/22/2014 3.30 (L) 4.50 - 10.70 K/Cumm Final     RBC   Date Value Ref Range Status   01/03/2017 3.76 (L) 3.90 - 5.00 10*6/mm3 Final   12/22/2014 3.70 (L) 3.90 - 5.20 Million Final     HEMOGLOBIN   Date Value Ref Range Status   01/03/2017 11.5 11.5 - 14.9 g/dL Final   12/22/2014 11.4 (L) 11.9 - 15.5 g/dL Final     HEMATOCRIT   Date Value Ref Range Status   01/03/2017 33.5 (L) 34.0 - 45.0 % Final   12/22/2014 34.8 (L) 35.6 - 45.5 % Final     PLATELETS   Date Value Ref Range Status   01/03/2017 136 (L) 150 - 375 10*3/mm3 Final   12/22/2014 201 140 - 500 K/Cumm Final      CT CAP  IMPRESSION:  1. There has unfortunately been development of new left subpectoral and  axillary lymphadenopathy and the multiple new enhancing mediastinal and  hilar nodes are suspected to be metastatic as well. There are also new  shotty left supraclavicular nodes.  2. There are new left lower lobe opacities and there has been interval  increase in size of a 1 cm nodular opacity in the left upper lobe. The  left lower lobe opacities may possibly be infectious or inflammatory,  but new metastatic disease cannot be excluded particularly given the new  lymphadenopathy.  3. There is no convincing evidence for metastatic disease within the  abdomen or pelvis.      MRI BRAIN   IMPRESSION:  1. Since most recent MRI 10/25/2016 there has been an interval increase  in size of the enhancing metastatic solitary metastatic lesion in the  inferior lateral left temporooccipital junction currently measures 9 x  10 x 9 mm whereas on 10/25/2016 it measured 7 x 5 x 5 mm. The edema  surrounding the  lesion has also increased and now tracks up to 3.4 x 2.5  cm whereas on previous exam it tracked 1.4 x 1 cm. No additional  metastatic lesions are seen in the brain.  2. There is stable bilobed T2 hyperintense lesion inferior to the left  sphenoid sinus measuring 12 x 8 mm in size unchanged over multiple exams  felt to be benign and probable intraosseous cyst. The remainder of the  MRI of the head is normal.      This report was finalized on 12/30/2016   .  Assessment/Plan   1. Metastatic ER/WA negative HER-2 positive breast cancer to brain and liver and chest wall. With progressive disease on Kadcyla   2. Mild elevations of LFTs  from Perjeta.  These have been stable since on Kadcyla.  Were reviewed today.  3.  Further Mild increase in size of 1 brain metastasis   4.  Left supraclavicular node  Plan   1.  Refer to radiation for focused radiation -I have not added Decadron as she is asymptomatic but I suspect she will need some 1 she gets treated   2.  Chemotherapy education for Xeloda and Tykerb starting with 5 pills a day of Xeloda-because of her borderline and CBC we may start with the Tykerb first which may have some beneficial effects on her brain metastasis also and add the Xeloda in about 2 weeks after radiation is completed  Yury is very practical and has a good idea of her disease status and realizes there is spent 2-1/2 years since her metastatic disease she is done well and was to have some options remaining but he is most interested in quality of life as we are and we will do whatever we can to make her treatments less toxic and she is very active and wants to remain that way as long as possible  Repeat echo will be due in 6 weeks  I'll see her back in a month for follow-up                  1/3/2017      CC:

## 2017-01-03 NOTE — PROGRESS NOTES
Rec message that Dr Manley would like to start Xeloda and Tykerb for pt. Obtained doses from Dr Manley and process will be started.    Xeloda 5 pills a day for 14 days on 14 days off.  Tykerb 5 pills daily.

## 2017-01-04 NOTE — PROGRESS NOTES
Referral for retreatment of progressing solitary brain met. Will order [id number] MRI for stereotactic treatment planning and see immediately after that scan to discuss and start treatment planning.

## 2017-01-04 NOTE — PROGRESS NOTES
Adrian and Tykerb PA's submitted to [employer] via [employer]    Pt is commercially insured and can use the copay card for Tykerb through [email or web address] ($25 copay). Xeloda does not have a copay card and there is no available assistance for Xeloda as this is available as generic.

## 2017-01-04 NOTE — PROGRESS NOTES
Subjective     No primary care provider on file.    PATIENT NAME:  Yury Mott  YOB: 1960  PATIENTS AGE:  56 y.o.  PATIENTS SEX:  female  DATE OF SERVICE:  01/04/2017  PROVIDER:  RAZ Willoughby      __________ORAL CHEMOTHERAPY PATIENT EDUCATION__________    PATIENT EDUCATION:  Today I met with the patient to discuss ORAL chemotherapy/biotherapy recommended for treatment of her disease.  Also discussed were medication administration, adherence, and proper handling/disposal.  The patient received the Oral Chemotherapy/Biotherapy Plan Summary including diagnosis and specific treatment plan.    SIDE EFFECTS:  Common side effects were discussed with the patient and/or significant other.  Discussion included hair loss/discoloration, anemia/fatigue, infection/chills/fever, appetite, bleeding risk/precautions, constipation, diarrhea, mouth sores, taste alteration, loss of appetite,nausea/vomiting, peripheral neuropathy, skin/nail changes, rash, muscle aches/weakness, photosensitivity, weight gain/loss, hearing loss, dizziness, menopausal symptoms, menstrrual irregularity, reproductive risk, high blood pressure, heart damage, liver damage, lung damage, kidney damage, DVT/PE risk, fluid retention, pleural/pericardial effusion, somnolence, electrolyte/LFT imbalance.    Discussion also included side effects specific to drugs in the treatment plan, specifically Tykerb and Xeloda.    Reproductive risks were discussed, including appropriate use of birth control and protection during sexual relations.    A total of 40 minutes were spent with the patient, with 100% of time spent in education and counseling.      RAZ Willoughby

## 2017-01-06 NOTE — PROGRESS NOTES
I contacted [facility] [phone] at the request of pt to see what her copay would be for Xeloda. At her chemo education appt, I spoke with her and let her know about the copay card for Tykerb ($25) and explained that there is no assistance for Xeloda since there is a generic available. She was concerned about her copay for the Xeloda as she stated cost would be an issue and she wouldn't be able to get the medication.  Her copay for Xeloda is $1,367.29  I left  for pt asking for a return call back.

## 2017-01-09 NOTE — PROGRESS NOTES
DIAGNOSIS and REASON FOR CONSULTATION: Breast cancer metastasized to brain, left temporal occipital; previously treated with stereotactic radiation therapy in 2015; now with progressive disease at that site    Referring Provider:  Ted Manley MD  Patient Care Team:  No Known Provider as PCP - General  No Known Provider as PCP - Family Medicine  Ted Manley MD as Consulting Physician (Hematology and Oncology)  Steven Barker MD as Referring Physician (Breast Surgery)    CHIEF COMPLAINT:  Breast cancer metastasized to brain, left  HISTORY OF PRESENT ILLNESS:  The patient is a 56 y.o. year old female who is well known to me from previous courses of radiation therapy since the diagnosis of her primary breast cancer.  We initially treated her left chest wall and ipsilateral keri regions in February and March 2015.  On July 10, 2015 retreated a solitary left temporal occipital brain metastasis stereotactically delivering 24 gray in 1 treatment.    Follow-up MRI on July 2, 2015 was stable and without new abnormality.  CT scans of the chest, abdomen and pelvis on August 18, 2015 showed stable disease and therefore she was continued on progesterone and Herceptin.  MRI of the brain on August 31, 2015 showed significant decrease in size of the treated metastasis measuring only 3 x 3 x 4 mm.  Her systemic treatment was continued and MRI of the brain on December 15, 2015 still showed stability and no new evidence of disease.  Progesterone and Herceptin were continued.    MRI of the brain on March 9, 2016 showed probable stability though there was some slight change in measurement and one dimension.  MRI of the brain on August 1, 2016 again showed slight interval enlargement of the solitary metastatic lesion measuring 6 x 6 x 6 mm with 10 mm of surrounding edema.  CAT scan of the chest, abdomen and pelvis on the same time frame showed new mild enlargement of precarinal mediastinal lymph nodes but nothing further.   Unfortunately scans on August 1, 2016 showed new multiple pulmonary lesions and bulky mediastinal and bilateral hilar lymphadenopathy and treatment was changed to kadcyla.     CT scan of the neck, chest, abdomen and pelvis on October 26, 2016 showed a positive response to therapy in the chest and no new evidence of disease while MRI of the brain on the same date showed an equivocal 1 mm increase in size of the previously mentioned lesion with slight increase in the vasogenic edema, again asymptomatic and so her treatment was continued.    Her most recent set of scans from December 30, 2016 show development of new left subpectoral and axillary lymphadenopathy, multiple new enhancing mediastinal and hilar nodes, new left supraclavicular nodes, increased in the 1 cm opacity in the left upper lobe while MRI of the brain on the same date showed another small increased in the left temporo-occipital lesion now measuring 9 x 10 x 9 mm with an increase in edema to 3.4 x 2.5 cm up from 1.4 x 1 cm on her last imaging.  Given the change in the brain, specifically with edema, she is referred now for consideration of retreatment of this solitary lesion.  Plans are for change in her systemic treatment from Kadcyla to Tykerb and she actually received her first treatment today.    She did undergo a 3DSPGR MRI yesterday which thankfully showed only the solitary 11 mm left temporal lobe lesion along with 3.5 cm of vasogenic edema. Clinically, she continues to do amazingly well. She denies any neurologic symptoms and is feeling fine other than the apprehension about the new treatment and the emotional concern about the progressive disease.     Past Medical History: she  has a past medical history of Anemia; Breast cancer; Liver metastasis; and Metastasis to brain.    Past Surgical History:  she has a past surgical history that includes Mastectomy (Left, 12/30/2014) and Portacath placement (06/2014).    Meds:    Current Outpatient  Prescriptions:   •  B COMPLEX VITAMINS PO, Take  by mouth daily., Disp: , Rfl:   •  Cholecalciferol (VITAMIN D PO), Take  by mouth., Disp: , Rfl:   •  lapatinib (TYKERB) 250 MG chemo tablet, Take 5 tablets by mouth Daily., Disp: 150 tablet, Rfl: 6  •  Multiple Vitamins-Minerals (MULTIVITAMIN PO), Take  by mouth daily., Disp: , Rfl:   •  ondansetron (ZOFRAN) 8 MG tablet, Take 1 tablet by mouth Every 8 (Eight) Hours As Needed for nausea or vomiting., Disp: 30 tablet, Rfl: 2  •  prochlorperazine (COMPAZINE) 10 MG tablet, Take 1 tablet by mouth Every 6 (Six) Hours As Needed for nausea or vomiting., Disp: 30 tablet, Rfl: 2  •  capecitabine (XELODA) 500 MG chemo tablet, Take 3 tabs po in the AM then take 2 tabs po in the PM for 14 days on then 14 days off., Disp: 70 tablet, Rfl: 2    Allergies:    Allergies   Allergen Reactions   • Latex        Family History:  her family history includes Anemia in her mother; Cancer in her other; Cancer (age of onset: 50) in her maternal grandmother; Cancer (age of onset: 70) in her mother; Heart disease in her father; Other in her maternal grandmother and other; Uterine cancer in her maternal grandmother.    Social History:  she  reports that she has never smoked. She has quit using smokeless tobacco. She reports that she does not drink alcohol or use illicit drugs.    Pertinent Findings on   Review of Systems   Constitutional: Negative for appetite change, chills, diaphoresis, fatigue, fever and unexpected weight change.   HENT:   Negative for hearing loss, lump/mass, mouth sores, nosebleeds, sore throat, tinnitus, trouble swallowing and voice change.    Eyes: Negative for eye problems and icterus.   Respiratory: Negative for chest tightness, cough, dizziness on exertion, hemoptysis, shortness of breath and wheezing.    Cardiovascular: Negative for chest pain, leg swelling and palpitations.   Gastrointestinal: Negative for abdominal distention, abdominal pain, blood in stool,  "constipation, diarrhea, nausea, rectal pain and vomiting.   Endocrine: Negative for hot flashes.   Genitourinary: Negative for bladder incontinence, difficulty urinating, dyspareunia, dysuria, frequency, hematuria, menstrual problem, nocturia, pelvic pain, vaginal bleeding and vaginal discharge.    Musculoskeletal: Negative for arthralgias, back pain, flank pain, gait problem, myalgias, neck pain and neck stiffness.   Skin: Negative for itching, rash and wound.   Neurological: Negative for dizziness, extremity weakness, gait problem, headaches, light-headedness, numbness, seizures and speech difficulty.   Hematological: Negative for adenopathy. Does not bruise/bleed easily.   Psychiatric/Behavioral: Negative for confusion, decreased concentration, depression, sleep disturbance and suicidal ideas. The patient is not nervous/anxious.    :  Vitals:    01/09/17 1100   BP: 126/80   Pulse: 74   Resp: 16   Temp: 97 °F (36.1 °C)   TempSrc: Oral   SpO2: 100%   Weight: 139 lb (63 kg)   Height: 67\" (170.2 cm)   PainSc: 0-No pain       Performance Status: (1) Restricted in physically strenuous activity, ambulatory and able to do work of light nature    Pertinent Findings on:  Physical Exam   Constitutional: She is oriented to person, place, and time. She appears well-developed and well-nourished. She is active and cooperative. No distress.   HENT:   Head: Normocephalic and atraumatic.   Nose: Nose normal.   Mouth/Throat: Mucous membranes are normal. Normal dentition.   Eyes: Conjunctivae and EOM are normal.   Neck: Normal range of motion.   Pulmonary/Chest: Effort normal.   Abdominal: Normal appearance. There is no hepatosplenomegaly.   Musculoskeletal: Normal range of motion.       Vascular Status -  Her exam exhibits no right foot edema. Her exam exhibits no left foot edema.  Neurological: She is alert and oriented to person, place, and time.   Skin: Skin is warm and dry.   Psychiatric: She has a normal mood and affect. Her " behavior is normal. Judgment and thought content normal.       Assessment: Metastatic Breast Cancer, to left temporal occipital region, treated previously with stereotactic radiation therapy; now with slowly progressing disease in that site    Plan:   I was asked to see her regarding possible treatment options for this slowly progressing lesion and to that end, I asked her to obtain a special thin slice MRI which we were then able to fuse to our treatment planning software to help determine dose restrictions and limitations.    There are a couple of options for treatment which include re treatment stereotactically to a smaller dose or treatment of the whole brain. As she continues to have only a solitary lesion, offering whole brain at this point makes little sense to me, especially given how important her performance status and quality of life are to her and she agrees.  Therefore, the options are further stereotactic treatment or continued observation.    She is most concerned with her systemic disease currently and is anxious about starting the new medications which is understandable. She understands the edema surrounding this solitary lesion is progressing but she remains asymptomatic. She is leaning toward continued observation of this area for now. We did discuss the importance of small volume disease in re treatment and also the possibility of further progression eventually warranting whole brain treatment and she expressed understanding.    I encouraged her to think over the decision for the next couple of days and let mek now if she changes her mind. She is due to start her Xeloda soon if we do not pursue radiation therapy and I will let Dr. Manley know of her decision.    I spent over 45 minutes face-to-face with the patient today and of that time, 35 minutes were spent counseling and coordinating care.

## 2017-01-09 NOTE — TELEPHONE ENCOUNTER
----- Message from Brittani Gerber sent at 1/9/2017  8:32 AM EST -----   Pt says Rupali Brown was suppose to order compazine for her and forgot        [phone]      Pt states that Rupali AREVALO sent in a script for zofran but the pharmacy did not receive the script for compazine. I re ordered compazine. Pt aware.

## 2017-01-10 NOTE — PROGRESS NOTES
Call from patient saying she wishes to proceed on with retreatment of left temporal lobe metastasis stereotactically as we discussed yesterday. Will get her in and start treatment planning.

## 2017-01-24 NOTE — TELEPHONE ENCOUNTER
Pt. States she did her r.t. Today.  A one time thing.  Feels strongly the tykerb isn't working due to enlarging lymph nodes in her neck and the number of nodes she has now.    Doesn't see dr. Manley until 2/7/17.  All d/w dr. Manley, instructed pt. To go ahead and start on the xeloda.  Pt. Instructed to call for any unusual s/e.  Understanding noted.

## 2017-01-24 NOTE — PROGRESS NOTES
RADIATION THERAPY TREATMENT SUMMARY    DIAGNOSIS:   Breast cancer metastasized to brain: Stage IV (T2, M1)    Patient Care Team:  No Known Provider as PCP - General  No Known Provider as PCP - Family Medicine  Ted Manley MD as Consulting Physician (Hematology and Oncology)  Steven Barker MD as Referring Physician (Breast Surgery)    The patient is a 56 y.o. year old female who has recently completed radiation therapy treatment for the above mentioned diagnosis. She received stereotactic treatment to a solitary met in the left temporal region in 2015. This area showed regrowth and after a lengthy discussion, she decided to pursue re treatment of this solitary lesion, again stereotactically.    Please see the specifics of the course of treatment below.    Dates of treatment:  1/24/2017  Treatment Site - Left Temporal Lobe  Treatment Intent - Curative  Total Dose in cGy - 1000  Number of Treatments - 1  Dose per fraction - 1000 cGy per fraction  Fractions per day - 1 fx/day  Fractions per week - 1 fx/week  Treatment Type - 3D, Stereotactic  Energy - 6 MVP  Normalization - Volumetric Normalization-- see below  Imaging/Field Verification - IGRT using KV/KV daily  Additional comments: - 100% Rx covers 99% of Tumor (no margin)    Tolerance and Toxicities:  she tolerated the treatment without difficulty.     F/U plans:  We dicussed a follow up MRI in 6-8 weeks and she wishes to coordinate this with her scanning scheduled thru the [facility] office so we will coordinate that as we are able.

## 2017-01-24 NOTE — TELEPHONE ENCOUNTER
----- Message from Marilyn Snow sent at 1/24/2017 12:36 PM EST -----  Contact: self   Pt has question about her mediation            [phone]

## 2017-01-24 NOTE — PROGRESS NOTES
"  Physician Stereotactic Management Note    Diagnosis:     1. Breast cancer metastasized to brain, left      Treatment Number and Dose: 1 of 1; 10 Gy    Ms. Mott was doing well prior to the treatment. She had no new complaints - is anxious to get the treatment completed so she can move on with systemic treatment.    I was personally present at the machine for the delivery of the above treatment.     I provided direct supervision of the patient's set up, treatment parameters and image guidance. I provided corrections and approval of the above prior to the treatment, in real time. I was present for any adjustments required due to patient motion, target movement or equipment issues.    The ongoing images for localization, tumor tracking, gating and beam's eye view localization images will also be approved.     Notes on treatment course, films, medical progress and plan:    Doing well. Tolerated treatment without difficulty. No problems or questions.    We disused a follow up MRI in approximately 6-8 weeks and follow up here thereafter. She knows to call if she has any problems prior to that.    Problem added:  None  Medications added:   None  Ancillary referrals made: None    I have reviewed and marked as \"reviewed\" the current medications, allergies and problem list in the patients EMR.    Patient's Care Team:  Patient Care Team:  No Known Provider as PCP - General  No Known Provider as PCP - Family Medicine  Ted Manley MD as Consulting Physician (Hematology and Oncology)  Steven Barker MD as Referring Physician (Breast Surgery)    Seen and approved by:  Ashley William MD, 01/24/2017  "

## 2017-01-26 NOTE — TELEPHONE ENCOUNTER
ORAL ADHERENCE PHONE NOTE  A phone conversation was held with the patient today regarding CHEMO/BIOTHERAPY, specifically Tykerb and Xeloda.   The treatment schedule was reviewed, including any scheduled breaks in therapy.  The patient verbalized understanding of how to take the medication, with or without food, and any food-drug interactions of which they should be aware.  The patient’s status was reviewed, including any possible side effects s/he may be experiencing.  Lab and appointment schedule were reviewed.  Patient concerns and questions were addressed and answered.  Shew called in yesterday reporting concern for Tykerb not working due to new nodules.  Per Dr Manley's reply she started Xeloda.  She is taking the two appropriately.  She tolerated Tykerb well aside from a rash wich is improving using cetaphil.  She has had no early symptoms with Xeloda.  She is using a urea cream to her hand and feet as well as bag balm.  Approximately 10 minutes were spent in education and counseling on oral medication adherence via the telephone.

## 2017-02-07 NOTE — PROGRESS NOTES
Subjective   REASONS FOR FOLLOWUP:   1. T3N2M1,  ER/KY negative, HER-2 positive breast cancer.   2. Biopsy-proven isolated   liver metastasis. Plan to do aggressive chemotherapy with TCH/Perjeta and reevaluate the liver after 3 cycles and continue 6 cycles of chemotherapy followed by Perjeta, Herceptin indefinitely as long as there is no recurrence.   3. Excellent response to 2 cycles of TCH/Perjeta.   4. Day 8 Herceptin missed cycle 2 because of anal fissure which needed to be operated on.   5. Near complete resolution with 4 cycles of TCH/Perjeta.   6. No further improvement, but no visible tumor in the chest and stable, barely detectable nodu le in the liver. Perjeta, Herceptin by themselves continued for now, with plans for surgery and radiation.   7. Negative PET scan after 6 cycles of TCH/Perjeta. Surgery on the left breast and axilla planned.   8. At the time of surgery, 12/31/2014, the patient h ad [id number] disease. The decision was made to do radiation to the axilla and supraclavicular areas and the liver lesion. Continue Perjeta and Herceptin indefinitely until there are signs of progression.   9. Skin lesion in left chest wall positive breast canc er. CT scans of chest, abdomen and pelvis showed no new disease on 02/10/2015. Radiation to the chest wall. Herceptin and Perjeta to continue until there is clear progression.   10. Stereotactic day radiation to solitary brain lesion completed. The patient remains on a Decadron taper.   11. Isolated brain met, irradiated on 07/10/2015 with focused radiation.    12.  Progressive pulmonary metastasis and left supraclavicular nodes and brain met on 8/9/2016 switched to Kadcyla                   13.  Progressive disease in lymph nodes on Kadcyla switched to Tykerb and Xeloda in 1/17    History of Present Illness  patient is a 54 yo female with metastatic HER-2 positive breast cancer ER/KY negative with progression in the brain and nodes on Kadcyla.  She is  receiving focused radiation to her brain metastases with concurrent Tykerb and is just finished one two-week cycle of Xeloda 5 pills a day which she tolerated well.  She was a little worried because her left supraclavicular node was growing while on single agent Tykerb but since starting the Xeloda this appears to be smaller and she is reassured.  She has no side effects from the radiation to the brain.  She has no diarrhea or hand-foot syndrome and overall appears to be doing well with the treatment  She feels well today.  She denies shortness of breath, fevers, pain.  She is eating well.    PAST MEDICAL HISTORY: Negative except for anemia intermittently which responded to iron.     PAST SURGICAL HISTORY: None.     OB-GYN HISTORY: Menarche at age 15, menopause .  2, para 2. First childbirth was at age 31. She breast-fed her first child for 4 months, but not her second one. She has not been on hormone replacement.     HEMATOLOGIC/ONCOLOGIC HISTORY:    The patient is a 54-year-old  [facility] who noted a mass in her right breast recently and immediately realized that it is probably a malignancy and went to see her family doctor who sent her for a mammogram and ultrasound. Mammogram and ultrasound confirmed an irregular mass in the 9 o'clock position of the left breast with an additional smaller area a mass in the subareolar tissue. Left breast ultrasound revealed 2.3 x 1.9 cm solid mass at the 9 o'clock position and a solid mass in the subareolar region, measuring 6 x 5 x 5, and also an enlarged lymph node measuring 2.3 x 2 in the left axilla. The patient underwent FNA of all three lesions and the pathology report showed the two lesions in the breast to be high grade invasive ductal car cinoma, grade 3, estrogen/progesterone receptors negative, HER-2 positivity was reported but I am not sure if both lesions were tested. Axillary lymph node cytology was positive for malignant cells.   The  patient was seen on 07/03/2014 with a normal bone sc an and echo which shows EF of 50% although it looked visually higher. CT scans of the chest, abdomen, and pelvis unfortunately showed the obvious breast mass in the left breast with enlarged lymph nodes in the axilla. In addition, there was a 6 mm pleural based nodule of the left lung base and a 1.5 cm low attenuation lesion in the hepatic segment, suspicious for metastatic disease. The decision was made to do a PET scan and biopsy of the liver lesion and tailor therapy according to the results. We discu ssed also that if she has an isolated metastasis in the liver, we would consider being aggressive and proceeding with treatment, as previously outlined, followed by a response of targeted therapy at the liver at the conclusion of treatment.   Liver biopsy s howed metastatic breast cancer and based on the fact this was an isolated metastasis we presented her at conference and opted to go for aggressive treatment with TCH/Perjeta, re-evaluate the liver lesion and if there is a complete response to continue wi th Arimidex, Perjeta and Herceptin as long as she is tolerating it well.   The patient had significant diarrhea toxicity with cycle 1. Neulasta added for cycle 2 with IV fluid support on day 5 and scans planned after cycle 2. Hemorrhoids being addressed by Dr. Molly Lopez.   The patient was seen on 08/21/2014 with scans that showed dramatic improvement in the primary breast tumor and the left axillary node and subpectoral nodes and the isolated liver metastasis, which is 50% smaller. Plan to continue for 6 do ses with followup scans and local therapy to the liver and breast if tolerated.   The patient was seen on 10/01/2014 with CAT scans that showed almost complete resolution of her single liver metastasis and is now 4 mm in size. The breast mass was not commen lauren on, suggesting that this is also essentially unremarkable and no significant axillary  adenopathy appreciated. The decision was made to finish 6 cycles of TCH/Perjeta and have surgery with left mastectomy and dissection followed by focus radiation to t he liver lesion and chest with Herceptin and Perjeta continued in a treatment setting.   Patient seen through triage in the infusion center on 11/06/2014 with reports of fever. Blood cultures obtained, urinalysis obtained and chest x-ray revealed negative results. She was started on Augmentin 875 mg twice daily for 7 days.   Patient seen on 11/13/2014 after 6 cycles of TCH/Perjeta. There is no tumor visible on the chest wall, breasts, axillae or supraclavicular areas. There is a 6 mm lung nodule, which is unc hanged and stable from pretreatment and very likely benign. The liver lesion is just barely perceptible and unchanged from the last scan. Decision was made to continue with Perjeta and Herceptin by themselves and evaluate for surgery and radiation therea fter. Possible directive therapy to the liver if there has been a complete pathological response in the chest and axilla.   Patient seen on 01/20/2015 with pathology report from her surgery showing just residual ductal carcinoma in situ with no invasive felix or left in the breast but 2 of 3 sentinel nodes positive for metastatic disease 2 mm deposits including the node with the clip in it. Decision was made to radiate the chest wall and liver lesion and continue Perjeta/Herceptin.   The patient was seen on 0 2/10/2015 with CT scans of chest, abdomen and pelvis which showed no evidence of progressive disease. There is a stable 6 mm lung nodule that is unchanged and a stable site of metastasis in the liver which is unchanged and no new evidence of metastatic d isease. Biopsy of the chest wall lesion showed breast cancer with HER-2 positivity. The decision was made, because there is no other progression outside the local area, to continue with radiation to the chest wall, including axillary,  subpectoral, supracl a vicular nodes and the skin lesion, and continue Perjeta and Herceptin until there is clinical progression outside the chest wall. We do realize at this point that there is rapid progression in the chest wall and it is very unlikely she is going to be cure d from her disease.   The patient was seen on 05/05/2015. Echocardiogram was stable. PET scan shows consolidation of the left upper lobe at the site of her radiation, which consists of radiation pneumonitis. There is a very low level activity in the chest wall, probably corresponding to her skin metastasis in the radiated field and the liver metastasis has resolved. The decision was made to continue with Herceptin/Perjeta for the time being with repeat scans in 4 months.   The patient had an MRI dated 06/18/ 2015 which showed an isolated 1 cm metastasis in the left inferior temporal gyrus. Focused radiation was given with good results. Patient seen on 07/28/2015 clinically doing well. She had a viral GI infection 2 days prior which has resolved with some re sultant mild leukopenia and thrombocytopenia. Echo showed ejection fraction of 52% but it was done at another facility and we will have it reviewed and continue with treatment for now, with scans planned in 3 weeks.   The patient was seen on 08/18/2015 wit h CT scans of chest, abdomen, and pelvis that show essentially stable disease with no new metastasis and some small nodules in the chest which are unchanged and no liver metastasis. Perjeta and Herceptin continue with plans for bone scan and MRI of the b rain to follow up on a brain in 3 weeks and mammogram and ultrasound ordered for some minimal swelling of the breast with no obvious masses.   Patient seen on 09/08/2015 with negative bone scan and MRI showing further resolution of her isolated brain met. Th e decision was made to continue on current treatment for the time being and follow up echoes every 4 months. Borderline iron  depletion with good improvement in symptoms with Feraheme.   Patient seen on 12/22/2015 with a brain MRI that shows 2 stable resid ual small lesions, post radiation in her brain. CT abdomen and pelvis and chest CT are negative for metastatic disease or obvious bony lesions. Perjeta/Herceptin continued with plans for echocardiograms every 4 months.    Patient seen on 02/02/2016, clinically stable. Echocardiogram dated 01/07/2016 shows an EF of 62%. Patient is having slow recovery from upper respiratory infection and we have given her a steroid pack and continued treatment, as she had scans just a month ago that were very good     Progressive pulmonary metastasis and left supraclavicular nodes and brain met on 8/9/2016 switched to Kadcyla  Unfortunately as we expected her CAT scans show progressive disease in the left supraclavicular area and mediastinum as well as some small pulmonary nodules and no disease in the abdomen.  Her brain MRI also shows increase in size of the solitary lesion in the left temporal occipital junction since October with some surrounding edema which is very asymptomatic       At this point clearly we need a change in systemic treatment and some local treatment for her solitary brain met.  Refer to radiation therapy for focused radiation and will switch to Tykerb and Xeloda.  Because of her borderline blood counts I have started was 5 pills of Xeloda daily 2 weeks on and 2 weeks off plus the Tykerb and she will have chemotherapy education for this next couple of days.  She will start the Tykerb next week and hold off on the Xeloda until her counts are better and the radiation is completed in 2 weeks  Progressive disease in lymph nodes and one new brain metastases on Kadcyla switched to Tykerb and Xeloda in 1/17 plus focused radiation to the brain    SOCIAL HISTORY: She is  and lives with her . She works as a  for [employer]. She does not smoke or drink. She has no risk  factors for HIV or drug history.     FAMILY HISTORY: Parents are both alive, father at 95 years and mother at 81 years. She has two sisters and one half-brother. Maternal grandmother had uterine cancer in 50s. No breast or ovarian cancer in the family.     Review of Systems   Constitutional: Positive for fatigue. Negative for activity change and fever.   HENT: Negative for mouth sores and nosebleeds.    Eyes: Negative for visual disturbance.   Respiratory: Negative for chest tightness and shortness of breath.    Cardiovascular: Negative for palpitations and leg swelling.   Gastrointestinal: Negative for constipation, diarrhea and nausea.   Genitourinary: Negative for difficulty urinating.   Musculoskeletal: Negative for arthralgias.   Skin: Negative for color change and rash.   Neurological: Negative for weakness and light-headedness.   Psychiatric/Behavioral: The patient is not nervous/anxious.    All other systems reviewed and are negative.         Current Outpatient Prescriptions on File Prior to Visit   Medication Sig Dispense Refill   • B COMPLEX VITAMINS PO Take  by mouth daily.     • capecitabine (XELODA) 500 MG chemo tablet Take 3 tabs po in the AM then take 2 tabs po in the PM for 14 days on then 14 days off. 70 tablet 2   • Cholecalciferol (VITAMIN D PO) Take  by mouth.     • lapatinib (TYKERB) 250 MG chemo tablet Take 5 tablets by mouth Daily. 150 tablet 6   • Multiple Vitamins-Minerals (MULTIVITAMIN PO) Take  by mouth daily.     • ondansetron (ZOFRAN) 8 MG tablet Take 1 tablet by mouth Every 8 (Eight) Hours As Needed for nausea or vomiting. 30 tablet 2   • prochlorperazine (COMPAZINE) 10 MG tablet Take 1 tablet by mouth Every 6 (Six) Hours As Needed for nausea or vomiting. 30 tablet 2     No current facility-administered medications on file prior to visit.          ALLERGIES:    Allergies   Allergen Reactions   • Latex        Objective      Vitals:    02/07/17 0813   BP: 130/76   Pulse: 99   Resp: 16  "  Temp: 98 °F (36.7 °C)   TempSrc: Oral   SpO2: 99%   Weight: 139 lb (63 kg)   Height: 67\" (170.2 cm)   PainSc: 0-No pain     Current Status 2/7/2017   ECOG score 0       Physical Exam    GENERAL:  Well-developed, well-nourished in no acute distress.   SKIN:  Warm, dry without rashes, purpura or petechiae.  EYES:  Pupils equal, round and reactive to light.  EOMs intact.  Conjunctivae normal.  EARS:  Hearing intact.  NOSE:  Septum midline.  No excoriations or nasal discharge.  MOUTH:  Tongue is well-papillated; no stomatitis or ulcers.  Lips normal.  THROAT:  Oropharynx without lesions or exudates.  NECK:  Supple with good range of motion; no thyromegaly or masses, no JVD. 1x1cm left supraclavicular node felt plus an additional 0.5 cm node posteriorly  LYMPHATICS:  No cervical, supraclavicular, axillary or inguinal adenopathy.  CHEST:  Lungs clear to auscultation. Good airflow.  CARDIAC:  Regular rate and rhythm without murmurs, rubs or gallops. Normal S1,S2.  ABDOMEN:  Soft, nontender with no hepatosplenomegaly or masses.  EXTREMITIES:  No clubbing, cyanosis or edema.  NEUROLOGICAL:  Cranial Nerves II-XII grossly intact.  No focal neurological deficits.  PSYCHIATRIC:  Normal affect and mood.            RECENT LABS:  Hematology WBC   Date Value Ref Range Status   02/07/2017 3.21 (L) 4.00 - 10.00 10*3/mm3 Final   12/22/2014 3.30 (L) 4.50 - 10.70 K/Cumm Final     RBC   Date Value Ref Range Status   02/07/2017 3.86 (L) 3.90 - 5.00 10*6/mm3 Final   12/22/2014 3.70 (L) 3.90 - 5.20 Million Final     HEMOGLOBIN   Date Value Ref Range Status   02/07/2017 12.0 11.5 - 14.9 g/dL Final   12/22/2014 11.4 (L) 11.9 - 15.5 g/dL Final     HEMATOCRIT   Date Value Ref Range Status   02/07/2017 35.3 34.0 - 45.0 % Final   12/22/2014 34.8 (L) 35.6 - 45.5 % Final     PLATELETS   Date Value Ref Range Status   02/07/2017 162 150 - 375 10*3/mm3 Final   12/22/2014 201 140 - 500 K/Cumm Final        .  Assessment/Plan   1. Metastatic ER/NH " negative HER-2 positive breast cancer to brain and liver and chest wall.  Currently on Tykerb and Xeloda with good tolerance   2. Mild elevations of LFTs  from Perjeta.   3.  Further Mild increase in size of 1 brain metastasis treated with focused radiation in 1/17   4.  Left supraclavicular node being evaluated for response clinically    Plan   1.  Continue Tykerb and Xeloda but increase from 5 pills a day to 6 pills of Xeloda a day and watch closely for hand-foot syndrome  2.  Port flushes every 6 weeks  3.  3 weeks CBC in 6 week M.D. Visit  4.  Follow up on  echocardiogram in 1 month  We will add a bone scan with her next visit to make sure she doesn't need Xgeva or Zometa              2/7/2017      CC:

## 2017-02-23 NOTE — TELEPHONE ENCOUNTER
"ORAL ADHERENCE PHONE NOTE  A phone conversation was held with the patient today regarding CHEMO/BIOTHERAPY, specifically Tykerb and Xeloda.   We reviewed the proper handling of the medication, as well as proper storage.  The patient also knows to dispose of the medication through the pharmacy or the office.  The treatment schedule was reviewed, including any scheduled breaks in therapy.  The patient verbalized understanding of how to take the medication, with or without food, and any food-drug interactions of which they should be aware.  The patient’s status was reviewed, including any possible side effects s/he may be experiencing.  Lab and appointment schedule were reviewed.  Patient concerns and questions were addressed and answered.  Approximately 12 minutes were spent in education and counseling on oral medication adherence via the telephone. She reports her stomach feels \"off\", after further discussion this could be defined as nausea.  She is unsure the culprit and it is not severe at all.  I've encouraged her to try some of her antiemetics.  She does have both ondansetron and prochlorperazine at home.  She is on a proximally day 8 of her cycle of Xeloda, out of 14 days.  She denies any hand-foot syndrome, diarrhea, or fevers.  She has had a slight dry cough though is not really bothersome.  She also reports a small amount of wheezing noted yesterday.  She does hikes frequently and we did discuss that as the weather has been warmer than normal we are seeing some allergens earlier as things are blooming.  She will take Claritin as needed.  He will call the office for any worsening symptoms.  She will monitor the timing of her symptoms to note if they do improve during her off week from Xeloda, if so we could then think her nausea is related to Xeloda.  If not, this could be related to possible sinus drainage from allergens.  Patient will notify the office for any questions, concerns, or change in symptoms.    "

## 2017-02-28 NOTE — PROGRESS NOTES
Patient here for cbc and review of tykerb/xeloda tolerance.   On 2-24 patient had a few episodes of diarrhea.  Patient took immodium.  Diarrhea resolved with this but patient had abdominal cramping for a few days and couldn't eat.  Patient then had another bout of diarrhea yesterday with a few loose/watery stools.  Did not take immodium, but is not having cramping today and no diarrhea today.     Patient does have red,warm,  shiny hands.  States her feet are the same way.  No open areas.  Patient states that feet are painful.  Reviewed staying away from chores that involve pressure/warmth to hands.   has taken over these chores.     Patient is using lotions for skin conditioning.  Skin does not appear dry.  Encouraged use of ice packs 4-5x day to hands/feet.     Reviewed with dr kauffman.     1) stop this cycle of xeloda(only one dose left to take)  2)continue tykerb  3)no yardwork this weekend (patient wanted to volunteer for this)  4) do not restart xeloda in a week if hand foot syndrome is not resolved.  Can restart this once fully resolved and then will only start xeldoa at 5 tabs daily.  Patient repeated and v/u;     Counts reviewed and stable at this time.

## 2017-03-02 NOTE — TELEPHONE ENCOUNTER
Pt calling because she states she has not had a BM since Sunday. She reports diarrhea on Sunday. She has been holding her Xeolda for hand/foot syndrome and she did not take her Tykerb because she is not eating much. She has been eating small meals but is afraid to take the Tykerb because of her stomach cramps. She has tried 2 tablespoons of mineral oil for the constipation but this has not helped. D/W Meredith Blanchard NP. Per Meredith, have pt take Senakot today to see if this helps. If it does not, she can try Miralax. She should try to take her Tykerb today. If she cannot take it today, try to take it tomorrow. If she still cannot take it tomorrow, she needs to call us back. Informed pt of this and she v/u. She is due to restart Xeolda next Wednesday. Advised her that if she feels she cannot restart this, she should call our office. She v/u.

## 2017-03-07 NOTE — PROGRESS NOTES
Rec VM from [facility] stating pt reported a decrease in her Xeloda dose. Pt stated to pharmacy that Dr Manley has decreased her Xeloda to 5 tabs a day. Per note from Linh FREDERICK RN on 2/28/17-Dr Manley DID decrease the Xeloda dose. New rx was escribed to [facility].

## 2017-03-07 NOTE — TELEPHONE ENCOUNTER
----- Message from Marilyn Sonw sent at 3/7/2017  7:41 AM EST -----   Pt has a lump on the back of her knee.        [phone]

## 2017-03-07 NOTE — TELEPHONE ENCOUNTER
Pt calling with golf ball sized mass on the back of her left knee. Nothing else around her knee is swollen and there is no pain. She noticed it this morning. D/W Dr. Manley. Per Dr. Manley, order an ultrasound of her left knee. Called pt and informed her. Orders placed in computer and message sent to scheduling.

## 2017-03-15 PROBLEM — Z45.2 FITTING AND ADJUSTMENT OF VASCULAR CATHETER: Status: ACTIVE | Noted: 2017-01-01

## 2017-03-21 NOTE — PROGRESS NOTES
Subjective   REASONS FOR FOLLOWUP:   1. T3N2M1,  ER/MD negative, HER-2 positive breast cancer.   2. Biopsy-proven isolated   liver metastasis. Plan to do aggressive chemotherapy with TCH/Perjeta and reevaluate the liver after 3 cycles and continue 6 cycles of chemotherapy followed by Perjeta, Herceptin indefinitely as long as there is no recurrence.   3. Excellent response to 2 cycles of TCH/Perjeta.   4. Day 8 Herceptin missed cycle 2 because of anal fissure which needed to be operated on.   5. Near complete resolution with 4 cycles of TCH/Perjeta.   6. No further improvement, but no visible tumor in the chest and stable, barely detectable nodu le in the liver. Perjeta, Herceptin by themselves continued for now, with plans for surgery and radiation.   7. Negative PET scan after 6 cycles of TCH/Perjeta. Surgery on the left breast and axilla planned.   8. At the time of surgery, 12/31/2014, the patient h ad [id number] disease. The decision was made to do radiation to the axilla and supraclavicular areas and the liver lesion. Continue Perjeta and Herceptin indefinitely until there are signs of progression.   9. Skin lesion in left chest wall positive breast canc er. CT scans of chest, abdomen and pelvis showed no new disease on 02/10/2015. Radiation to the chest wall. Herceptin and Perjeta to continue until there is clear progression.   10. Stereotactic day radiation to solitary brain lesion completed. The patient remains on a Decadron taper.   11. Isolated brain met, irradiated on 07/10/2015 with focused radiation.    12.  Progressive pulmonary metastasis and left supraclavicular nodes and brain met on 8/9/2016 switched to Kadcyla                   13.  Progressive disease in lymph nodes on Kadcyla switched to Tykerb and Xeloda in 1/17    History of Present Illness  patient is a 54 yo female with metastatic HER-2 positive breast cancer ER/MD negative with progression in the brain and nodes on Kadcyla.  She is  receiving focused radiation to her brain metastases with concurrent Tykerb and is just finished one two-week cycle of Xeloda 5 pills a day which she tolerated well.  She comes in today obviously concerned because her left supraclavicular nodes are increasing significantly in size despite being on Xeloda and Tykerb.  In addition she has a acneiform eruption on her face which is very  bothersome from the Tykerb.  It is clear that she is not responding to these medications and she is finally agreeable to a clinical trial.  I called Dr. Betty Rivera at [facility] and they currently do not have a trial that she is eligible for.  We do have the match trial here in our institution and we have tentatively given her the consent form for this.  I've elected to rebiopsy her supra-clavicular node that this point to see if they are still HER-2 positive regardless of the study but if she agrees to the study we could use this tissue to do the testing for targeted option on the match trial.  I have given her some MetroGel ointment for her skin rash and she will continue the Tykerb for the time being.  I will also reschedule scans to have a new baseline    PAST MEDICAL HISTORY: Negative except for anemia intermittently which responded to iron.   Active Ambulatory Problems     Diagnosis Date Noted   • Breast cancer metastasized to brain 2016   • Breast cancer metastasized to liver 2016   • Encounter for long-term (current) use of high-risk medication 2016   • Fitting and adjustment of vascular catheter 03/15/2017     Resolved Ambulatory Problems     Diagnosis Date Noted   • No Resolved Ambulatory Problems     Past Medical History   Diagnosis Date   • Anemia    • Breast cancer    • Liver metastasis    • Metastasis to brain        PAST SURGICAL HISTORY: None.     OB-GYN HISTORY: Menarche at age 15, menopause .  2, para 2. First childbirth was at age 31. She breast-fed her first child for 4 months, but not  her second one. She has not been on hormone replacement.     HEMATOLOGIC/ONCOLOGIC HISTORY:    The patient is a 54-year-old  with [employer] who noted a mass in her right breast recently and immediately realized that it is probably a malignancy and went to see her family doctor who sent her for a mammogram and ultrasound. Mammogram and ultrasound confirmed an irregular mass in the 9 o'clock position of the left breast with an additional smaller area a mass in the subareolar tissue. Left breast ultrasound revealed 2.3 x 1.9 cm solid mass at the 9 o'clock position and a solid mass in the subareolar region, measuring 6 x 5 x 5, and also an enlarged lymph node measuring 2.3 x 2 in the left axilla. The patient underwent FNA of all three lesions and the pathology report showed the two lesions in the breast to be high grade invasive ductal car cinoma, grade 3, estrogen/progesterone receptors negative, HER-2 positivity was reported but I am not sure if both lesions were tested. Axillary lymph node cytology was positive for malignant cells.   The patient was seen on 07/03/2014 with a normal bone sc an and echo which shows EF of 50% although it looked visually higher. CT scans of the chest, abdomen, and pelvis unfortunately showed the obvious breast mass in the left breast with enlarged lymph nodes in the axilla. In addition, there was a 6 mm pleural based nodule of the left lung base and a 1.5 cm low attenuation lesion in the hepatic segment, suspicious for metastatic disease. The decision was made to do a PET scan and biopsy of the liver lesion and tailor therapy according to the results. We discu ssed also that if she has an isolated metastasis in the liver, we would consider being aggressive and proceeding with treatment, as previously outlined, followed by a response of targeted therapy at the liver at the conclusion of treatment.   Liver biopsy s howed metastatic breast cancer and based on the fact this was  an isolated metastasis we presented her at conference and opted to go for aggressive treatment with TCH/Perjeta, re-evaluate the liver lesion and if there is a complete response to continue wi  Arimidex, Perjeta and Herceptin as long as she is tolerating it well.   The patient had significant diarrhea toxicity with cycle 1. Neulasta added for cycle 2 with IV fluid support on day 5 and scans planned after cycle 2. Hemorrhoids being addressed by Dr. Molly Lopez.   The patient was seen on 08/21/2014 with scans that showed dramatic improvement in the primary breast tumor and the left axillary node and subpectoral nodes and the isolated liver metastasis, which is 50% smaller. Plan to continue for 6 do ses with followup scans and local therapy to the liver and breast if tolerated.   The patient was seen on 10/01/2014 with CAT scans that showed almost complete resolution of her single liver metastasis and is now 4 mm in size. The breast mass was not commen lauren on, suggesting that this is also essentially unremarkable and no significant axillary adenopathy appreciated. The decision was made to finish 6 cycles of TCH/Perjeta and have surgery with left mastectomy and dissection followed by focus radiation to t he liver lesion and chest with Herceptin and Perjeta continued in a treatment setting.   Patient seen through triage in the infusion center on 11/06/2014 with reports of fever. Blood cultures obtained, urinalysis obtained and chest x-ray revealed negative results. She was started on Augmentin 875 mg twice daily for 7 days.   Patient seen on 11/13/2014 after 6 cycles of TCH/Perjeta. There is no tumor visible on the chest wall, breasts, axillae or supraclavicular areas. There is a 6 mm lung nodule, which is unc hanged and stable from pretreatment and very likely benign. The liver lesion is just barely perceptible and unchanged from the last scan. Decision was made to continue with Perjeta and Herceptin by themselves  and evaluate for surgery and radiation therea fter. Possible directive therapy to the liver if there has been a complete pathological response in the chest and axilla.   Patient seen on 01/20/2015 with pathology report from her surgery showing just residual ductal carcinoma in situ with no invasive felix or left in the breast but 2 of 3 sentinel nodes positive for metastatic disease 2 mm deposits including the node with the clip in it. Decision was made to radiate the chest wall and liver lesion and continue Perjeta/Herceptin.   The patient was seen on 0 2/10/2015 with CT scans of chest, abdomen and pelvis which showed no evidence of progressive disease. There is a stable 6 mm lung nodule that is unchanged and a stable site of metastasis in the liver which is unchanged and no new evidence of metastatic d isease. Biopsy of the chest wall lesion showed breast cancer with HER-2 positivity. The decision was made, because there is no other progression outside the local area, to continue with radiation to the chest wall, including axillary, subpectoral, supracl a vicular nodes and the skin lesion, and continue Perjeta and Herceptin until there is clinical progression outside the chest wall. We do realize at this point that there is rapid progression in the chest wall and it is very unlikely she is going to be cure d from her disease.   The patient was seen on 05/05/2015. Echocardiogram was stable. PET scan shows consolidation of the left upper lobe at the site of her radiation, which consists of radiation pneumonitis. There is a very low level activity in the chest wall, probably corresponding to her skin metastasis in the radiated field and the liver metastasis has resolved. The decision was made to continue with Herceptin/Perjeta for the time being with repeat scans in 4 months.   The patient had an MRI dated 06/18/ 2015 which showed an isolated 1 cm metastasis in the left inferior temporal gyrus. Focused radiation was  given with good results. Patient seen on 07/28/2015 clinically doing well. She had a viral GI infection 2 days prior which has resolved with some re sultant mild leukopenia and thrombocytopenia. Echo showed ejection fraction of 52% but it was done at another facility and we will have it reviewed and continue with treatment for now, with scans planned in 3 weeks.   The patient was seen on 08/18/2015 wit h CT scans of chest, abdomen, and pelvis that show essentially stable disease with no new metastasis and some small nodules in the chest which are unchanged and no liver metastasis. Perjeta and Herceptin continue with plans for bone scan and MRI of the b rain to follow up on a brain in 3 weeks and mammogram and ultrasound ordered for some minimal swelling of the breast with no obvious masses.   Patient seen on 09/08/2015 with negative bone scan and MRI showing further resolution of her isolated brain met. Th e decision was made to continue on current treatment for the time being and follow up echoes every 4 months. Borderline iron depletion with good improvement in symptoms with Feraheme.   Patient seen on 12/22/2015 with a brain MRI that shows 2 stable resid ual small lesions, post radiation in her brain. CT abdomen and pelvis and chest CT are negative for metastatic disease or obvious bony lesions. Perjeta/Herceptin continued with plans for echocardiograms every 4 months.    Patient seen on 02/02/2016, clinically stable. Echocardiogram dated 01/07/2016 shows an EF of 62%. Patient is having slow recovery from upper respiratory infection and we have given her a steroid pack and continued treatment, as she had scans just a month ago that were very good     Progressive pulmonary metastasis and left supraclavicular nodes and brain met on 8/9/2016 switched to Kadcyla  Unfortunately as we expected her CAT scans show progressive disease in the left supraclavicular area and mediastinum as well as some small pulmonary nodules  and no disease in the abdomen.  Her brain MRI also shows increase in size of the solitary lesion in the left temporal occipital junction since October with some surrounding edema which is very asymptomatic       At this point clearly we need a change in systemic treatment and some local treatment for her solitary brain met.  Refer to radiation therapy for focused radiation and will switch to Tykerb and Xeloda.  Because of her borderline blood counts I have started was 5 pills of Xeloda daily 2 weeks on and 2 weeks off plus the Tykerb and she will have chemotherapy education for this next couple of days.  She will start the Tykerb next week and hold off on the Xeloda until her counts are better and the radiation is completed in 2 weeks  Progressive disease in lymph nodes and one new brain metastases on Kadcyla switched to Tykerb and Xeloda in 1/17 plus focused radiation to the brain    SOCIAL HISTORY: She is  and lives with her . She works as a  for [employer]. She does not smoke or drink. She has no risk factors for HIV or drug history.     FAMILY HISTORY: Parents are both alive, father at 95 years and mother at 81 years. She has two sisters and one half-brother. Maternal grandmother had uterine cancer in 50s. No breast or ovarian cancer in the family.     Review of Systems   Constitutional: Positive for fatigue. Negative for activity change and fever.   HENT: Negative for mouth sores and nosebleeds.    Eyes: Negative for visual disturbance.   Respiratory: Positive for cough and shortness of breath. Negative for chest tightness.    Cardiovascular: Negative for palpitations and leg swelling.   Gastrointestinal: Negative for constipation, diarrhea and nausea.   Genitourinary: Negative for difficulty urinating.   Musculoskeletal: Negative for arthralgias.   Skin: Positive for rash. Negative for color change.   Neurological: Negative for weakness and light-headedness.   Psychiatric/Behavioral:  "The patient is not nervous/anxious.    All other systems reviewed and are negative.         Current Outpatient Prescriptions on File Prior to Visit   Medication Sig Dispense Refill   • B COMPLEX VITAMINS PO Take  by mouth daily.     • capecitabine (XELODA) 500 MG chemo tablet Take 3 tabs po in the AM then take 2 tabs in the PM for 14 days on then 14 days off. 70 tablet 2   • Cholecalciferol (VITAMIN D PO) Take  by mouth.     • lapatinib (TYKERB) 250 MG chemo tablet Take 5 tablets by mouth Daily. 150 tablet 6   • Multiple Vitamins-Minerals (MULTIVITAMIN PO) Take  by mouth daily.     • ondansetron (ZOFRAN) 8 MG tablet Take 1 tablet by mouth Every 8 (Eight) Hours As Needed for nausea or vomiting. 30 tablet 2   • prochlorperazine (COMPAZINE) 10 MG tablet Take 1 tablet by mouth Every 6 (Six) Hours As Needed for nausea or vomiting. 30 tablet 2     No current facility-administered medications on file prior to visit.          ALLERGIES:    Allergies   Allergen Reactions   • Latex        Objective      Vitals:    03/21/17 1009   BP: 96/62   Pulse: 107   Resp: 18   Temp: 97.4 °F (36.3 °C)   TempSrc: Oral   SpO2: 99%   Weight: 132 lb 3.2 oz (60 kg)   Height: 67\" (170.2 cm)   PainSc: 0-No pain     Current Status 3/21/2017   ECOG score 0       Physical Exam    GENERAL:  Well-developed, well-nourished in no acute distress.   SKIN:  Warm, dry without rashes, purpura or petechiae.  EYES:  Pupils equal, round and reactive to light.  EOMs intact.  Conjunctivae normal.  EARS:  Hearing intact.  NOSE:  Septum midline.  No excoriations or nasal discharge.  MOUTH:  Tongue is well-papillated; no stomatitis or ulcers.  Lips normal.  THROAT:  Oropharynx without lesions or exudates.  NECK:  Supple with good range of motion; no thyromegaly or masses, no JVD. 2x2cm left supraclavicular node felt plus an additional 2-3 nodes felt  posteriorly  LYMPHATICS:  No cervical, supraclavicular, axillary or inguinal adenopathy.  CHEST:  Lungs clear to " auscultation. Good airflow.  CARDIAC:  Regular rate and rhythm without murmurs, rubs or gallops. Normal S1,S2.  ABDOMEN:  Soft, nontender with no hepatosplenomegaly or masses.  EXTREMITIES:  No clubbing, cyanosis or edema.  NEUROLOGICAL:  Cranial Nerves II-XII grossly intact.  No focal neurological deficits.  PSYCHIATRIC:  Normal affect and mood.            RECENT LABS:  Hematology WBC   Date Value Ref Range Status   03/21/2017 3.97 (L) 4.00 - 10.00 10*3/mm3 Final   12/22/2014 3.30 (L) 4.50 - 10.70 K/Cumm Final     RBC   Date Value Ref Range Status   03/21/2017 3.32 (L) 3.90 - 5.00 10*6/mm3 Final   12/22/2014 3.70 (L) 3.90 - 5.20 Million Final     HEMOGLOBIN   Date Value Ref Range Status   03/21/2017 10.8 (L) 11.5 - 14.9 g/dL Final   12/22/2014 11.4 (L) 11.9 - 15.5 g/dL Final     HEMATOCRIT   Date Value Ref Range Status   03/21/2017 31.0 (L) 34.0 - 45.0 % Final   12/22/2014 34.8 (L) 35.6 - 45.5 % Final     PLATELETS   Date Value Ref Range Status   03/21/2017 188 150 - 375 10*3/mm3 Final   12/22/2014 201 140 - 500 K/Cumm Final        .  Assessment/Plan   1. Metastatic ER/CT negative HER-2 positive breast cancer to brain and liver and chest wall.  Currently on Tykerb and Xeloda with good tolerance   2. Mild elevations of LFTs  from Perjeta.   3.  Further Mild increase in size of 1 brain metastasis treated with focused radiation in 1/17   4.  Left supraclavicular nodeincreased significantly in number and size on Tykerb and Xeloda confirming progression    Plan   1.  Continue Tykerb and  stopped Xeloda   2.  Port flushes every 6 weeks  3.  Restaging with CAT scans and bone scan and rebiopsy the left supraclavicular nodes for ER/CT and HER-2  4.  Possible match trial candidate  5.  MetroGel cream for acneform rash  6.  Return in 2-3 weeks review these tests and make a treatment decision                    3/21/2017      CC:

## 2017-03-22 NOTE — TELEPHONE ENCOUNTER
----- Message from Brittani Gerber sent at 3/22/2017  3:36 PM EDT -----   Pt says insurance will not cover Levequin 250 mg but will cover 500 mg a day.  Needs the change called to [facility] at [phone]    Spoke with pharmacy. Informed them that I sent the wrong dosing instructions in. New script escribed

## 2017-03-22 NOTE — TELEPHONE ENCOUNTER
Dr. Manley called and asked me to notify pt that her xray should pneumonia on the left side. Dr. Manley wants her to take levaquin 500mg twice daily for 7 days. Informed pt. Pt v/u. Med sent to pt's pharmacy

## 2017-03-23 NOTE — RESEARCH
RESEARCH INFORMED CONSENT    EAY 131 Consent for Screening for Treatment Assignment Based on Genetic Testing for Research Studies. [id number]: Molecular Analysis for Therapy Choice (MATCH)          The consent form was explained and summarized by the investigator and/or coordinator. The Subject was given the opportunity as well to continue to review the consent and take copy home to review further and share with family and/or significant others.  The subject was given the opportunity to ask questions of the coordinator and the investigator. All of subjects questions were answered to subjects complete satisfaction.       The subject has read and verbalized an understanding of the informed consent. All questions and concerns were addressed. she signed and was given a copy of the signed, written informed consent prior to performing any study-related procedures.      The subject had an ECG done per protocol after the informed consent process.    Reviewed the subject's medication log. The subject reported that she stopped Xeloda 3/21/2017 and Tykerb 3/22/2017.

## 2017-03-28 NOTE — TELEPHONE ENCOUNTER
----- Message from Marilyn Snow sent at 3/28/2017  3:58 PM EDT -----   Pt was started on levaquin and she dose not feel any better and she has a cough    [phone]    Reviewed symptoms with Dr. Manley, she does not want to do another round of abx, because she feels like the cough is likely related to her cancer.  Will give patient script for hycodan to help with cough.  Patient v/u and will  script when she can.

## 2017-04-03 NOTE — PROGRESS NOTES
Procedure     ECG 12 Lead  Date/Time: 3/23/2017 9:41 AM  Performed by: OTTO SALGADO JR  Authorized by: ISRA CORDERO   Comparison: not compared with previous ECG   Previous ECG: no previous ECG available  Rhythm: sinus tachycardia  BPM: 103  Other findings: early transition  Clinical impression: normal ECG

## 2017-04-03 NOTE — RESEARCH
Notified patient of Her 2 results (3+) from recent biopsy on 3/24/17 per Dr. Manley. Patient voiced understanding.

## 2017-04-11 NOTE — PROGRESS NOTES
Subjective   REASONS FOR FOLLOWUP:   1. T3N2M1,  ER/[place] negative, HER-2 positive breast cancer.   2. Biopsy-proven isolated   liver metastasis. Plan to do aggressive chemotherapy with TCH/Perjeta and reevaluate the liver after 3 cycles and continue 6 cycles of chemotherapy followed by Perjeta, Herceptin indefinitely as long as there is no recurrence.   3. Excellent response to 2 cycles of TCH/Perjeta.   4. Day 8 Herceptin missed cycle 2 because of anal fissure which needed to be operated on.   5. Near complete resolution with 4 cycles of TCH/Perjeta.   6. No further improvement, but no visible tumor in the chest and stable, barely detectable nodu le in the liver. Perjeta, Herceptin by themselves continued for now, with plans for surgery and radiation.   7. Negative PET scan after 6 cycles of TCH/Perjeta. Surgery on the left breast and axilla planned.   8. At the time of surgery, 12/31/2014, the patient h ad [id number] disease. The decision was made to do radiation to the axilla and supraclavicular areas and the liver lesion. Continue Perjeta and Herceptin indefinitely until there are signs of progression.   9. Skin lesion in left chest wall positive breast canc er. CT scans of chest, abdomen and pelvis showed no new disease on 02/10/2015. Radiation to the chest wall. Herceptin and Perjeta to continue until there is clear progression.   10. Stereotactic day radiation to solitary brain lesion completed. The patient remains on a Decadron taper.   11. Isolated brain met, irradiated on 07/10/2015 with focused radiation.    12.  Progressive pulmonary metastasis and left supraclavicular nodes and brain met on 8/9/2016 switched to Kadcyla                   13.  Progressive disease in lymph nodes on Kadcyla switched to Tykerb and Xeloda in 1/17    History of Present Illness  patient is a 56-year-old lady with metastatic HER-2 positive breast cancer to brain and bones and chest comes in today to review the results of her lymph  node biopsy and imaging.  Lymph node biopsy confirmed breast cancer which is still 3+ Her2 positive.  CT of the chest and abdomen shows progressive disease in the left lung and mediastinum and hilum with no metastasis in the abdomen.    She has had progressive pain in the chest on coughing more shortness of breath weight loss and general decline.  She has been taking plain Tylenol and Advil which is inadequate to control her pain and has resisted taking stronger medications but realizes that this time to do so.  She does not like taking his Hycodan syrup in the daytime and given her some Tessalon Perles and I've added prednisone 20 mg twice a day because there is a little bit of a lymphangitic spread in the left upper lobe which might benefit from the steroids.  I  The final testing on her tumor has not come back from M.D. [facility] awaiting this to put on the clinical trial which her tumor testing will determine and hopefully we can get started this week.  PAST MEDICAL HISTORY: Negative except for anemia intermittently which responded to iron.   Active Ambulatory Problems     Diagnosis Date Noted   • Breast cancer metastasized to brain 2016   • Breast cancer metastasized to liver 2016   • Encounter for long-term (current) use of high-risk medication 2016   • Fitting and adjustment of vascular catheter 03/15/2017     Resolved Ambulatory Problems     Diagnosis Date Noted   • No Resolved Ambulatory Problems     Past Medical History:   Diagnosis Date   • Anemia    • Breast cancer    • Liver metastasis    • Metastasis to brain        PAST SURGICAL HISTORY: None.     OB-GYN HISTORY: Menarche at age 15, menopause .  2, para 2. First childbirth was at age 31. She breast-fed her first child for 4 months, but not her second one. She has not been on hormone replacement.     HEMATOLOGIC/ONCOLOGIC HISTORY:    The patient is a 54-year-old  with [employer] who noted a mass in her right  breast recently and immediately realized that it is probably a malignancy and went to see her family doctor who sent her for a mammogram and ultrasound. Mammogram and ultrasound confirmed an irregular mass in the 9 o'clock position of the left breast with an additional smaller area a mass in the subareolar tissue. Left breast ultrasound revealed 2.3 x 1.9 cm solid mass at the 9 o'clock position and a solid mass in the subareolar region, measuring 6 x 5 x 5, and also an enlarged lymph node measuring 2.3 x 2 in the left axilla. The patient underwent FNA of all three lesions and the pathology report showed the two lesions in the breast to be high grade invasive ductal car cinoma, grade 3, estrogen/progesterone receptors negative, HER-2 positivity was reported but I am not sure if both lesions were tested. Axillary lymph node cytology was positive for malignant cells.   The patient was seen on 07/03/2014 with a normal bone sc an and echo which shows EF of 50% although it looked visually higher. CT scans of the chest, abdomen, and pelvis unfortunately showed the obvious breast mass in the left breast with enlarged lymph nodes in the axilla. In addition, there was a 6 mm pleural based nodule of the left lung base and a 1.5 cm low attenuation lesion in the hepatic segment, suspicious for metastatic disease. The decision was made to do a PET scan and biopsy of the liver lesion and tailor therapy according to the results. We discu ssed also that if she has an isolated metastasis in the liver, we would consider being aggressive and proceeding with treatment, as previously outlined, followed by a response of targeted therapy at the liver at the conclusion of treatment.   Liver biopsy s howed metastatic breast cancer and based on the fact this was an isolated metastasis we presented her at conference and opted to go for aggressive treatment with TCH/Perjeta, re-evaluate the liver lesion and if there is a complete response to  continue wi th Arimidex, Perjeta and Herceptin as long as she is tolerating it well.   The patient had significant diarrhea toxicity with cycle 1. Neulasta added for cycle 2 with IV fluid support on day 5 and scans planned after cycle 2. Hemorrhoids being addressed by Dr. Molly Lopez.   The patient was seen on 08/21/2014 with scans that showed dramatic improvement in the primary breast tumor and the left axillary node and subpectoral nodes and the isolated liver metastasis, which is 50% smaller. Plan to continue for 6 do ses with followup scans and local therapy to the liver and breast if tolerated.   The patient was seen on 10/01/2014 with CAT scans that showed almost complete resolution of her single liver metastasis and is now 4 mm in size. The breast mass was not commen lauren on, suggesting that this is also essentially unremarkable and no significant axillary adenopathy appreciated. The decision was made to finish 6 cycles of TCH/Perjeta and have surgery with left mastectomy and dissection followed by focus radiation to t he liver lesion and chest with Herceptin and Perjeta continued in a treatment setting.   Patient seen through triage in the infusion center on 11/06/2014 with reports of fever. Blood cultures obtained, urinalysis obtained and chest x-ray revealed negative results. She was started on Augmentin 875 mg twice daily for 7 days.   Patient seen on 11/13/2014 after 6 cycles of TCH/Perjeta. There is no tumor visible on the chest wall, breasts, axillae or supraclavicular areas. There is a 6 mm lung nodule, which is unc hanged and stable from pretreatment and very likely benign. The liver lesion is just barely perceptible and unchanged from the last scan. Decision was made to continue with Perjeta and Herceptin by themselves and evaluate for surgery and radiation therea fter. Possible directive therapy to the liver if there has been a complete pathological response in the chest and axilla.   Patient seen  on 01/20/2015 with pathology report from her surgery showing just residual ductal carcinoma in situ with no invasive felix or left in the breast but 2 of 3 sentinel nodes positive for metastatic disease 2 mm deposits including the node with the clip in it. Decision was made to radiate the chest wall and liver lesion and continue Perjeta/Herceptin.   The patient was seen on 0 2/10/2015 with CT scans of chest, abdomen and pelvis which showed no evidence of progressive disease. There is a stable 6 mm lung nodule that is unchanged and a stable site of metastasis in the liver which is unchanged and no new evidence of metastatic d isease. Biopsy of the chest wall lesion showed breast cancer with HER-2 positivity. The decision was made, because there is no other progression outside the local area, to continue with radiation to the chest wall, including axillary, subpectoral, supracl a vicular nodes and the skin lesion, and continue Perjeta and Herceptin until there is clinical progression outside the chest wall. We do realize at this point that there is rapid progression in the chest wall and it is very unlikely she is going to be cure d from her disease.   The patient was seen on 05/05/2015. Echocardiogram was stable. PET scan shows consolidation of the left upper lobe at the site of her radiation, which consists of radiation pneumonitis. There is a very low level activity in the chest wall, probably corresponding to her skin metastasis in the radiated field and the liver metastasis has resolved. The decision was made to continue with Herceptin/Perjeta for the time being with repeat scans in 4 months.   The patient had an MRI dated 06/18/ 2015 which showed an isolated 1 cm metastasis in the left inferior temporal gyrus. Focused radiation was given with good results. Patient seen on 07/28/2015 clinically doing well. She had a viral GI infection 2 days prior which has resolved with some re sultant mild leukopenia and  thrombocytopenia. Echo showed ejection fraction of 52% but it was done at another facility and we will have it reviewed and continue with treatment for now, with scans planned in 3 weeks.   The patient was seen on 08/18/2015 wit h CT scans of chest, abdomen, and pelvis that show essentially stable disease with no new metastasis and some small nodules in the chest which are unchanged and no liver metastasis. Perjeta and Herceptin continue with plans for bone scan and MRI of the b rain to follow up on a brain in 3 weeks and mammogram and ultrasound ordered for some minimal swelling of the breast with no obvious masses.   Patient seen on 09/08/2015 with negative bone scan and MRI showing further resolution of her isolated brain met. Th e decision was made to continue on current treatment for the time being and follow up echoes every 4 months. Borderline iron depletion with good improvement in symptoms with Feraheme.   Patient seen on 12/22/2015 with a brain MRI that shows 2 stable resid ual small lesions, post radiation in her brain. CT abdomen and pelvis and chest CT are negative for metastatic disease or obvious bony lesions. Perjeta/Herceptin continued with plans for echocardiograms every 4 months.    Patient seen on 02/02/2016, clinically stable. Echocardiogram dated 01/07/2016 shows an EF of 62%. Patient is having slow recovery from upper respiratory infection and we have given her a steroid pack and continued treatment, as she had scans just a month ago that were very good     Progressive pulmonary metastasis and left supraclavicular nodes and brain met on 8/9/2016 switched to Kadcyla  Unfortunately as we expected her CAT scans show progressive disease in the left supraclavicular area and mediastinum as well as some small pulmonary nodules and no disease in the abdomen.  Her brain MRI also shows increase in size of the solitary lesion in the left temporal occipital junction since October with some surrounding  edema which is very asymptomatic       At this point clearly we need a change in systemic treatment and some local treatment for her solitary brain met.  Refer to radiation therapy for focused radiation and will switch to Tykerb and Xeloda.  Because of her borderline blood counts I have started was 5 pills of Xeloda daily 2 weeks on and 2 weeks off plus the Tykerb and she will have chemotherapy education for this next couple of days.  She will start the Tykerb next week and hold off on the Xeloda until her counts are better and the radiation is completed in 2 weeks  Progressive disease in lymph nodes and one new brain metastases on Kadcyla switched to Tykerb and Xeloda in 1/17 plus focused radiation to the brain  patient is a 54 yo female with metastatic HER-2 positive breast cancer ER/[place] negative with progression in the brain and nodes on Kadcyla.  She is receiving focused radiation to her brain metastases with concurrent Tykerb and is just finished one two-week cycle of Xeloda 5 pills a day which she tolerated well.  She comes in today obviously concerned because her left supraclavicular nodes are increasing significantly in size despite being on Xeloda and Tykerb.  In addition she has a acneiform eruption on her face which is very  bothersome from the Tykerb.  It is clear that she is not responding to these medications and she is finally agreeable to a clinical trial.  I called Dr. Betty Rivera at [facility] and they currently do not have a trial that she is eligible for.  We do have the match trial here in our institution and we have tentatively given her the consent form for this.  I've elected to rebiopsy her supra-clavicular node that this point to see if they are still HER-2 positive regardless of the study but if she agrees to the study we could use this tissue to do the testing for targeted option on the match trial.    SOCIAL HISTORY: She is  and lives with her . She works as a   for [employer]. She does not smoke or drink. She has no risk factors for HIV or drug history.     FAMILY HISTORY: Parents are both alive, father at 95 years and mother at 81 years. She has two sisters and one half-brother. Maternal grandmother had uterine cancer in 50s. No breast or ovarian cancer in the family.     Review of Systems   Constitutional: Positive for activity change, fatigue and unexpected weight change. Negative for fever.   HENT: Negative for mouth sores and nosebleeds.    Eyes: Negative for visual disturbance.   Respiratory: Positive for cough, shortness of breath and wheezing. Negative for chest tightness.    Cardiovascular: Positive for chest pain. Negative for palpitations and leg swelling.   Gastrointestinal: Positive for nausea. Negative for constipation and diarrhea.   Genitourinary: Negative for difficulty urinating.   Musculoskeletal: Negative for arthralgias.   Skin: Positive for rash. Negative for color change.   Neurological: Negative for weakness and light-headedness.   Psychiatric/Behavioral: The patient is not nervous/anxious.    All other systems reviewed and are negative.         Current Outpatient Prescriptions on File Prior to Visit   Medication Sig Dispense Refill   • B COMPLEX VITAMINS PO Take  by mouth daily.     • HYDROcodone-homatropine (HYCODAN) 5-1.5 MG/5ML syrup Take 5 mL by mouth Every 6 (Six) Hours As Needed for Cough. 473 mL 0   • metroNIDAZOLE (METROGEL) 1 % gel Apply  topically Daily. 60 g 0   • Multiple Vitamins-Minerals (MULTIVITAMIN PO) Take  by mouth daily.     • ondansetron (ZOFRAN) 8 MG tablet Take 1 tablet by mouth Every 8 (Eight) Hours As Needed for nausea or vomiting. 30 tablet 2   • prochlorperazine (COMPAZINE) 10 MG tablet Take 1 tablet by mouth Every 6 (Six) Hours As Needed for nausea or vomiting. 30 tablet 2   • [DISCONTINUED] levoFLOXacin (LEVAQUIN) 500 MG tablet Take 1 tablet by mouth 2 (Two) Times a Day. 14 tablet 0     No current facility-administered  "medications on file prior to visit.          ALLERGIES:    Allergies   Allergen Reactions   • Latex        Objective      Vitals:    04/11/17 0754   BP: 120/70   Pulse: 96   Resp: 16   Temp: 98 °F (36.7 °C)   Weight: 134 lb 3.2 oz (60.9 kg)   Height: 67\" (170.2 cm)   PainSc: 6  Comment: shoulders and back     Current Status 4/11/2017   ECOG score 0       Physical Exam    GENERAL:  Well-developed, well-nourished in no acute distress.   SKIN:  Warm, dry without rashes, purpura or petechiae.  EYES:  Pupils equal, round and reactive to light.  EOMs intact.  Conjunctivae normal.  EARS:  Hearing intact.  NOSE:  Septum midline.  No excoriations or nasal discharge.  MOUTH:  Tongue is well-papillated; no stomatitis or ulcers.  Lips normal.  THROAT:  Oropharynx without lesions or exudates.  NECK:  Supple with good range of motion; no thyromegaly or masses, no JVD. 2x2cm left supraclavicular node felt plus an additional 2-3 nodes felt  posteriorly  LYMPHATICS:  Left, supraclavicular nodes palpated 2 x 2,   CHEST:  Lungs Decreased breath sounds in the entire left lung the right lung is normal with Good airflow.  CARDIAC:  Regular rate and rhythm without murmurs, rubs or gallops. Normal S1,S2.  ABDOMEN:  Soft, nontender with no hepatosplenomegaly or masses.  EXTREMITIES:  No clubbing, cyanosis or edema.  NEUROLOGICAL:  Cranial Nerves II-XII grossly intact.  No focal neurological deficits.  PSYCHIATRIC:  Normal affect and mood.            RECENT LABS:  Hematology WBC   Date Value Ref Range Status   04/11/2017 4.64 4.00 - 10.00 10*3/mm3 Final     RBC   Date Value Ref Range Status   04/11/2017 3.25 (L) 3.90 - 5.00 10*6/mm3 Final     Hemoglobin   Date Value Ref Range Status   04/11/2017 10.8 (L) 11.5 - 14.9 g/dL Final     Hematocrit   Date Value Ref Range Status   04/11/2017 31.6 (L) 34.0 - 45.0 % Final     Platelets   Date Value Ref Range Status   04/11/2017 257 150 - 375 10*3/mm3 Final      CT CAP  .IMPRESSION:  1. There has been " significant worsening in the metastatic breast cancer  when compared to prior chest, neck, chest, abdomen and pelvic CT  12/30/2016.  2. This patient has had a previous left mastectomy. Since prior neck,  chest, abdomen and pelvic CT 12/30/2016, there has been interval  worsening with increase in size in the neoplastic nodes in the left  axilla and left subpectoral region worsening in the pretracheal, right  paratracheal, subcarinal and left hilar lymphadenopathy worsening of the  lymphangitic tumor spread to the left lung with multiple enlarging  peripheral left pulmonary masses along the tract of the lymphangitic  tumor spread. The single largest of which is in the lateral aspect of  the left lower lobe that measures 3.8 x 3.6 x 4.8 cm whereas on previous  exam was a vague 1.5 x 1.5 x 2 cm opacity and this mass tracks straight  back from the lateral pleural surface medially to the left hilum. There  are multiple peripheral irregular masses in the left upper and lower  lobe that are along the course of the lymphangitic tumor spread. There  is increasing very tiny irregular opacities in the right upper lobe as  well.  3. I see no evidence of metastatic disease in the abdomen or pelvis or  in the   Assessment/Plan   1. Metastatic ER/CA negative HER-2 positive breast cancer to brain and liver and chest wall progressed on Tykerb and Xeloda being considered for the match trial awaiting results from M.D [facility] on her tumor biopsy    2. Mild elevations of LFTs  from Perjeta.   3.  Further Mild increase in size of 1 brain metastasis treated with focused radiation in 1/17   4.  Left supraclavicular node i ncreased significantly in number and size on Tykerb and Xeloda confirming progression    Plan   1.  Await results from .D. Elijah for the  match trial   5.  Prednisone 20 twice a day for some lymphangitic spread in the lung   3. Tessalon Perles and Hycodan for cough and Percocet for pain   6.  Return in 1  Week and  have a bone scan done in the interim                    4/11/2017      CC:

## 2017-04-12 NOTE — TELEPHONE ENCOUNTER
----- Message from Amy Leonard sent at 4/12/2017  1:17 PM EDT -----  Contact: self   Sick from her percocet       Pt calling because she has vomited twice after taking percocet. She eats before she takes it. She has not been taking her antiemetics prior to taking her pain medication. Advised her to take either Zofran or compazine 30 min prior to taking Percocet. She v/u. She will call back if this does not help.

## 2017-04-14 NOTE — TELEPHONE ENCOUNTER
----- Message from Marilyn Snow sent at 4/14/2017  4:10 PM EDT -----   Pt is calling to see what study dr kauffman was wanting to  try  [phone]      Per Dr. Kauffman, it is a phase 3 trial which patient is ok with participating in.  It will be in [place] and we are not sure of a date yet because the facility wouldn't schedule it yet.  All records are being faxed to the facility ronal Ramirez at the appt desk.

## 2017-04-17 NOTE — PROGRESS NOTES
Pt first treatment with Navelbine- Printout from [facility] was given to and reviewed with pt. Specifically reviewed the most common side effects.  Verified with patient that she has compazine and zofran at home for nausea.  Consent form obtained.      Pt was also inquiring about the prednisone she is taking and how long she should be on it.  I reviewed this with Dr. Manley and she said she could take 1 tab/day- Per the patient she has been taking just one tab per day as this is what was written on the instruction- she continues to have issues with her breathing and coughing.  Dr. Malney then instructs her to increase to two tabs per day for one week then reduce back to one tab per day.      After completion of Navelbine pt complained of discomfort/pain in her back/lungs. No increase or change in her shortness of breath.  Pt has rx at home for percocet but did not bring them with her.  OK per Kaylan Landeros to order her one to take here.

## 2017-04-24 NOTE — PROGRESS NOTES
Pt still having difficulty with exhalation.  She is going back to her one a day prednisone starting today and she is a little hesitant about this d/t her breathing.  She wants to try to taper and will call us with any problems or if she feels like she needs to go back up on her prednisone.  She is also asking about taking OTC vitamin C.  I called and s/w Dr. Manley at [facility].  She would like pt to go see radiation for consult for possible radiation for her respiratory symptoms.  She is ok with pt taking vit. C until she sees MD next.  Pt sent to appt desk after chemo today to get radiation consult scheduled.

## 2017-04-26 PROBLEM — R52 INTRACTABLE PAIN: Status: ACTIVE | Noted: 2017-01-01

## 2017-04-26 PROBLEM — R11.2 NAUSEA AND VOMITING: Status: ACTIVE | Noted: 2017-01-01

## 2017-04-26 NOTE — TELEPHONE ENCOUNTER
Patient called tonight in severe pain 'all over'.      Has been taking one oxycodone/apap 3/325mg every 3.5 hours which isn't really helping.  Last dose around 6:45    Advised taking 600mg ibuprofen now and if that hasn't helped in about an hour to take two of the oxydoeone/apap.  Advised two 10mg predisone if needed but she wants to be able to sleep tonight.  Advised going to the ER if still in severe pain but she doesn't want to have to do that.  Advised calling the office in the AM if pain persists.

## 2017-04-26 NOTE — PROGRESS NOTES
Subjective   REASONS FOR FOLLOWUP:   1. T3N2M1,  ER/GA negative, HER-2 positive breast cancer.   2. Biopsy-proven isolated   liver metastasis. Plan to do aggressive chemotherapy with TCH/Perjeta and reevaluate the liver after 3 cycles and continue 6 cycles of chemotherapy followed by Perjeta, Herceptin indefinitely as long as there is no recurrence.   3. Excellent response to 2 cycles of TCH/Perjeta.   4. Day 8 Herceptin missed cycle 2 because of anal fissure which needed to be operated on.   5. Near complete resolution with 4 cycles of TCH/Perjeta.   6. No further improvement, but no visible tumor in the chest and stable, barely detectable nodu le in the liver. Perjeta, Herceptin by themselves continued for now, with plans for surgery and radiation.   7. Negative PET scan after 6 cycles of TCH/Perjeta. Surgery on the left breast and axilla planned.   8. At the time of surgery, 12/31/2014, the patient h ad [id number] disease. The decision was made to do radiation to the axilla and supraclavicular areas and the liver lesion. Continue Perjeta and Herceptin indefinitely until there are signs of progression.   9. Skin lesion in left chest wall positive breast canc er. CT scans of chest, abdomen and pelvis showed no new disease on 02/10/2015. Radiation to the chest wall. Herceptin and Perjeta to continue until there is clear progression.   10. Stereotactic day radiation to solitary brain lesion completed. The patient remains on a Decadron taper.   11. Isolated brain met, irradiated on 07/10/2015 with focused radiation.    12.  Progressive pulmonary metastasis and left supraclavicular nodes and brain met on 8/9/2016 switched to Kadcyla                   13.  Progressive disease in lymph nodes on Kadcyla switched to Tykerb and Xeloda in 1/17         14. Patient started on Navelbine and Herceptin                             on 4/17/2017. She was seen through triage                            With reports of increased  shortness of                                  breath, nausea and vomiting and                                            intractable pain. She was admitted to [facility]                          for MRI of the brain and radiation oncology                           consult.     History of Present Illness  patient is a 56-year-old lady with metastatic HER-2 positive breast cancer to brain and bones and chest comes in today to review the results of her lymph node biopsy and imaging.  Lymph node biopsy confirmed breast cancer which is still 3+ Her2 positive.  CT of the chest and abdomen shows progressive disease in the left lung and mediastinum and hilum with no metastasis in the abdomen.    SHe has progressed through multiple lines of therapy and was recently disqualified from a clinical trial at [facility]. She started Navelbine with Herceptin on 4/17/2017.    She is seen today through triage with reports of increased shortness of breath. This is particularly worse with expiration and she reports episodes where she feels like she cannot fully expel air.     She is also experiencing significant nausea and vomiting which is not relieved with Compazine and ZOfran. She has been taking prednisone 10mg daily for her shortness of breath biut was unable to take this morning because she is unable to hold anything down.    She reports significant pain in the back lung fields worse on the left side. She also reports intermittent pain all over. She has taken Percocet 10.325 just before coming into the office but this provides her with very little relief.She is and has been hesitant to take pain medication as she was a Hospice worker for 20 years and has strong feelings about this. She also does not like the way narcotics make her feel.    She denies fevers, chills, diarrhea. She is very weak and required a wheelchair in the office today for mobility. She is unable to stand for a weight.     She denies outright vision changes but does  experience ocular migraines and these have worsened over the last couple of days.      PAST MEDICAL HISTORY: Negative except for anemia intermittently which responded to iron.   Active Ambulatory Problems     Diagnosis Date Noted   • Breast cancer metastasized to brain 2016   • Breast cancer metastasized to liver 2016   • Encounter for long-term (current) use of high-risk medication 2016   • Fitting and adjustment of vascular catheter 03/15/2017     Resolved Ambulatory Problems     Diagnosis Date Noted   • No Resolved Ambulatory Problems     Past Medical History:   Diagnosis Date   • Anemia    • Breast cancer    • Liver metastasis    • Metastasis to brain        PAST SURGICAL HISTORY: None.     OB-GYN HISTORY: Menarche at age 15, menopause .  2, para 2. First childbirth was at age 31. She breast-fed her first child for 4 months, but not her second one. She has not been on hormone replacement.     HEMATOLOGIC/ONCOLOGIC HISTORY:    The patient is a 54-year-old  [facility] who noted a mass in her right breast recently and immediately realized that it is probably a malignancy and went to see her family doctor who sent her for a mammogram and ultrasound. Mammogram and ultrasound confirmed an irregular mass in the 9 o'clock position of the left breast with an additional smaller area a mass in the subareolar tissue. Left breast ultrasound revealed 2.3 x 1.9 cm solid mass at the 9 o'clock position and a solid mass in the subareolar region, measuring 6 x 5 x 5, and also an enlarged lymph node measuring 2.3 x 2 in the left axilla. The patient underwent FNA of all three lesions and the pathology report showed the two lesions in the breast to be high grade invasive ductal car cinoma, grade 3, estrogen/progesterone receptors negative, HER-2 positivity was reported but I am not sure if both lesions were tested. Axillary lymph node cytology was positive for malignant cells.   The  patient was seen on 07/03/2014 with a normal bone sc an and echo which shows EF of 50% although it looked visually higher. CT scans of the chest, abdomen, and pelvis unfortunately showed the obvious breast mass in the left breast with enlarged lymph nodes in the axilla. In addition, there was a 6 mm pleural based nodule of the left lung base and a 1.5 cm low attenuation lesion in the hepatic segment, suspicious for metastatic disease. The decision was made to do a PET scan and biopsy of the liver lesion and tailor therapy according to the results. We discu ssed also that if she has an isolated metastasis in the liver, we would consider being aggressive and proceeding with treatment, as previously outlined, followed by a response of targeted therapy at the liver at the conclusion of treatment.   Liver biopsy s howed metastatic breast cancer and based on the fact this was an isolated metastasis we presented her at conference and opted to go for aggressive treatment with TCH/Perjeta, re-evaluate the liver lesion and if there is a complete response to continue wi th Arimidex, Perjeta and Herceptin as long as she is tolerating it well.   The patient had significant diarrhea toxicity with cycle 1. Neulasta added for cycle 2 with IV fluid support on day 5 and scans planned after cycle 2. Hemorrhoids being addressed by Dr. Molly Lopez.   The patient was seen on 08/21/2014 with scans that showed dramatic improvement in the primary breast tumor and the left axillary node and subpectoral nodes and the isolated liver metastasis, which is 50% smaller. Plan to continue for 6 do ses with followup scans and local therapy to the liver and breast if tolerated.   The patient was seen on 10/01/2014 with CAT scans that showed almost complete resolution of her single liver metastasis and is now 4 mm in size. The breast mass was not commen lauren on, suggesting that this is also essentially unremarkable and no significant axillary  adenopathy appreciated. The decision was made to finish 6 cycles of TCH/Perjeta and have surgery with left mastectomy and dissection followed by focus radiation to t he liver lesion and chest with Herceptin and Perjeta continued in a treatment setting.   Patient seen through triage in the infusion center on 11/06/2014 with reports of fever. Blood cultures obtained, urinalysis obtained and chest x-ray revealed negative results. She was started on Augmentin 875 mg twice daily for 7 days.   Patient seen on 11/13/2014 after 6 cycles of TCH/Perjeta. There is no tumor visible on the chest wall, breasts, axillae or supraclavicular areas. There is a 6 mm lung nodule, which is unc hanged and stable from pretreatment and very likely benign. The liver lesion is just barely perceptible and unchanged from the last scan. Decision was made to continue with Perjeta and Herceptin by themselves and evaluate for surgery and radiation therea fter. Possible directive therapy to the liver if there has been a complete pathological response in the chest and axilla.   Patient seen on 01/20/2015 with pathology report from her surgery showing just residual ductal carcinoma in situ with no invasive felix or left in the breast but 2 of 3 sentinel nodes positive for metastatic disease 2 mm deposits including the node with the clip in it. Decision was made to radiate the chest wall and liver lesion and continue Perjeta/Herceptin.   The patient was seen on 0 2/10/2015 with CT scans of chest, abdomen and pelvis which showed no evidence of progressive disease. There is a stable 6 mm lung nodule that is unchanged and a stable site of metastasis in the liver which is unchanged and no new evidence of metastatic d isease. Biopsy of the chest wall lesion showed breast cancer with HER-2 positivity. The decision was made, because there is no other progression outside the local area, to continue with radiation to the chest wall, including axillary,  subpectoral, supracl a vicular nodes and the skin lesion, and continue Perjeta and Herceptin until there is clinical progression outside the chest wall. We do realize at this point that there is rapid progression in the chest wall and it is very unlikely she is going to be cure d from her disease.   The patient was seen on 05/05/2015. Echocardiogram was stable. PET scan shows consolidation of the left upper lobe at the site of her radiation, which consists of radiation pneumonitis. There is a very low level activity in the chest wall, probably corresponding to her skin metastasis in the radiated field and the liver metastasis has resolved. The decision was made to continue with Herceptin/Perjeta for the time being with repeat scans in 4 months.   The patient had an MRI dated 06/18/ 2015 which showed an isolated 1 cm metastasis in the left inferior temporal gyrus. Focused radiation was given with good results. Patient seen on 07/28/2015 clinically doing well. She had a viral GI infection 2 days prior which has resolved with some re sultant mild leukopenia and thrombocytopenia. Echo showed ejection fraction of 52% but it was done at another facility and we will have it reviewed and continue with treatment for now, with scans planned in 3 weeks.   The patient was seen on 08/18/2015 wit h CT scans of chest, abdomen, and pelvis that show essentially stable disease with no new metastasis and some small nodules in the chest which are unchanged and no liver metastasis. Perjeta and Herceptin continue with plans for bone scan and MRI of the b rain to follow up on a brain in 3 weeks and mammogram and ultrasound ordered for some minimal swelling of the breast with no obvious masses.   Patient seen on 09/08/2015 with negative bone scan and MRI showing further resolution of her isolated brain met. Th e decision was made to continue on current treatment for the time being and follow up echoes every 4 months. Borderline iron  depletion with good improvement in symptoms with Feraheme.   Patient seen on 12/22/2015 with a brain MRI that shows 2 stable resid ual small lesions, post radiation in her brain. CT abdomen and pelvis and chest CT are negative for metastatic disease or obvious bony lesions. Perjeta/Herceptin continued with plans for echocardiograms every 4 months.    Patient seen on 02/02/2016, clinically stable. Echocardiogram dated 01/07/2016 shows an EF of 62%. Patient is having slow recovery from upper respiratory infection and we have given her a steroid pack and continued treatment, as she had scans just a month ago that were very good     Progressive pulmonary metastasis and left supraclavicular nodes and brain met on 8/9/2016 switched to Kadcyla  Unfortunately as we expected her CAT scans show progressive disease in the left supraclavicular area and mediastinum as well as some small pulmonary nodules and no disease in the abdomen.  Her brain MRI also shows increase in size of the solitary lesion in the left temporal occipital junction since October with some surrounding edema which is very asymptomatic       At this point clearly we need a change in systemic treatment and some local treatment for her solitary brain met.  Refer to radiation therapy for focused radiation and will switch to Tykerb and Xeloda.  Because of her borderline blood counts I have started was 5 pills of Xeloda daily 2 weeks on and 2 weeks off plus the Tykerb and she will have chemotherapy education for this next couple of days.  She will start the Tykerb next week and hold off on the Xeloda until her counts are better and the radiation is completed in 2 weeks  Progressive disease in lymph nodes and one new brain metastases on Kadcyla switched to Tykerb and Xeloda in 1/17 plus focused radiation to the brain  patient is a 56 yo female with metastatic HER-2 positive breast cancer ER/IN negative with progression in the brain and nodes on Kadcyla.  She is  receiving focused radiation to her brain metastases with concurrent Tykerb and is just finished one two-week cycle of Xeloda 5 pills a day which she tolerated well.  She comes in today obviously concerned because her left supraclavicular nodes are increasing significantly in size despite being on Xeloda and Tykerb.  In addition she has a acneiform eruption on her face which is very  bothersome from the Tykerb.  It is clear that she is not responding to these medications and she is finally agreeable to a clinical trial.  I called Dr. Betty Rivera at [facility] and they currently do not have a trial that she is eligible for.  We do have the match trial here in our institution and we have tentatively given her the consent form for this.  I've elected to rebiopsy her supra-clavicular node that this point to see if they are still HER-2 positive regardless of the study but if she agrees to the study we could use this tissue to do the testing for targeted option on the match trial.    SOCIAL HISTORY: She is  and lives with her . She works as a  for [employer]. She does not smoke or drink. She has no risk factors for HIV or drug history.     FAMILY HISTORY: Parents are both alive, father at 95 years and mother at 81 years. She has two sisters and one half-brother. Maternal grandmother had uterine cancer in 50s. No breast or ovarian cancer in the family.     Review of Systems   Constitutional: Positive for activity change, fatigue and unexpected weight change. Negative for fever.   HENT: Negative.  Negative for mouth sores and nosebleeds.    Eyes: Negative for visual disturbance.        See HPI   Respiratory: Positive for cough and wheezing. Negative for chest tightness.    Cardiovascular: Positive for chest pain. Negative for palpitations and leg swelling.   Gastrointestinal: Positive for abdominal pain, nausea and vomiting. Negative for constipation and diarrhea.   Endocrine: Negative.   "  Genitourinary: Negative.  Negative for difficulty urinating.   Musculoskeletal: Negative for arthralgias.   Skin: Positive for rash. Negative for color change.   Neurological: Negative for weakness and light-headedness.        See HPI   Psychiatric/Behavioral: Negative.  The patient is not nervous/anxious.    All other systems reviewed and are negative.         Current Outpatient Prescriptions on File Prior to Visit   Medication Sig Dispense Refill   • B COMPLEX VITAMINS PO Take  by mouth daily.     • benzonatate (TESSALON PERLES) 100 MG capsule Take 1 capsule by mouth 3 (Three) Times a Day As Needed for Cough. 90 capsule 3   • HYDROcodone-homatropine (HYCODAN) 5-1.5 MG/5ML syrup Take 5 mL by mouth Every 6 (Six) Hours As Needed for Cough. 473 mL 0   • metroNIDAZOLE (METROGEL) 1 % gel Apply  topically Daily. 60 g 0   • Multiple Vitamins-Minerals (MULTIVITAMIN PO) Take  by mouth daily.     • ondansetron (ZOFRAN) 8 MG tablet Take 1 tablet by mouth Every 8 (Eight) Hours As Needed for nausea or vomiting. 30 tablet 2   • oxyCODONE-acetaminophen (PERCOCET) 5-325 MG per tablet Take 1 tablet by mouth Every 4 (Four) Hours As Needed for Moderate Pain (4-6). 100 tablet 0   • predniSONE (DELTASONE) 10 MG tablet Take 1 tablet by mouth Daily. 60 tablet 3   • sennosides-docusate sodium (SENOKOT-S) 8.6-50 MG tablet Take 2 tablets by mouth Daily. 60 tablet 5   • [DISCONTINUED] ibuprofen (ADVIL,MOTRIN) 200 MG tablet Take 200 mg by mouth Every 6 (Six) Hours As Needed for Mild Pain (1-3). 2 tabs     • [DISCONTINUED] prochlorperazine (COMPAZINE) 10 MG tablet Take 1 tablet by mouth Every 6 (Six) Hours As Needed for nausea or vomiting. 30 tablet 2     No current facility-administered medications on file prior to visit.          ALLERGIES:    Allergies   Allergen Reactions   • Latex        Objective      Vitals:    04/26/17 1113   BP: 151/98   Pulse: 106   Resp: 16   Temp: 98.5 °F (36.9 °C)   TempSrc: Oral   SpO2: 92%   Height: 67\" (170.2 " cm)   PainSc: Comment: Unable to stand for weight.Pt weak,back pain,nausea.     Current Status 4/26/2017   ECOG score 0       Physical Exam   Constitutional: She is oriented to person, place, and time. She appears well-developed.   Ill appearing female seen in the infusion area with her . She is clearly uncomfortable and shifting frequently.   Eyes: Conjunctivae and EOM are normal. Pupils are equal, round, and reactive to light.   Neck: Normal range of motion. Neck supple.   Cardiovascular: Regular rhythm.    Tachycardic   Pulmonary/Chest:   Decreased throughout the lung with grunting noted on expiration.    Abdominal: Soft. Bowel sounds are normal. She exhibits no mass. There is no tenderness. There is no guarding.   Musculoskeletal: Normal range of motion.   Neurological: She is alert and oriented to person, place, and time.   Skin: Skin is warm and dry. No rash noted.   Psychiatric: She has a normal mood and affect. Her behavior is normal.   Nursing note and vitals reviewed.                RECENT LABS:  Hematology WBC   Date Value Ref Range Status   04/26/2017 5.19 4.00 - 10.00 10*3/mm3 Final     RBC   Date Value Ref Range Status   04/26/2017 3.39 (L) 3.90 - 5.00 10*6/mm3 Final     Hemoglobin   Date Value Ref Range Status   04/26/2017 11.4 (L) 11.5 - 14.9 g/dL Final     Hematocrit   Date Value Ref Range Status   04/26/2017 32.2 (L) 34.0 - 45.0 % Final     Platelets   Date Value Ref Range Status   04/26/2017 310 150 - 375 10*3/mm3 Final      CT CAP  .IMPRESSION:  1. There has been significant worsening in the metastatic breast cancer. She has progressed through multiple lines of therapy and started Navelbine and Herceptin on 4/17/2017.  2. Cancer related pain that is not controlled with Percocet 10. She does not want Morphine and has stated this adamantly.  3. Intractable nausea and vomiting. She is taking Compazine and Zofran. She is also taking Prednisone 10mg daily for shortness of breath but was unable  to hold this down this morning.  4. Increased weakness.   5. Hyponatremia. Her sodium today is 123 as opposed to 132 on 4/17/2017.     Assessment/Plan   1.We have provided Mrs Mott with 1000cc'c normal saline today. She recevied 1mg Kytril and 10mg Compazine. We also provided 4mg IV Decadron.    2.We provided the patient with 5/325 Lortab in the office today.     3. Dr. Manley has the opportunity to see the patient today and we have opted to go forward with admission to [facility]. I have ordered a consult to radiation oncology in hoped of radiating the left lung. I have also ordered and MRI of the brain.    4. We will provide pain control with 0.25-0.5mg of Dilaudid every 3 hours as needed.    5. I have discussed admission with the patient and her  today and they are pleased with the plan of care. The patient's son is graduating college on 5/8/2017 and she as verbalized the importance of attending his graduation.                    4/26/2017      CC:

## 2017-04-26 NOTE — H&P
Subjective .     REASON FOR ADMISSION:      HISTORY OF PRESENT ILLNESS:  The patient is a 56 y.o. year old female who is here for follow-up with the above-mentioned history.    History of Present Illness      REASONS FOR FOLLOWUP:   1. T3N2M1,  ER/CT negative, HER-2 positive breast cancer.   2. Biopsy-proven isolated   liver metastasis. Plan to do aggressive chemotherapy with TCH/Perjeta and reevaluate the liver after 3 cycles and continue 6 cycles of chemotherapy followed by Perjeta, Herceptin indefinitely as long as there is no recurrence.   3. Excellent response to 2 cycles of TCH/Perjeta.   4. Day 8 Herceptin missed cycle 2 because of anal fissure which needed to be operated on.   5. Near complete resolution with 4 cycles of TCH/Perjeta.   6. No further improvement, but no visible tumor in the chest and stable, barely detectable nodu le in the liver. Perjeta, Herceptin by themselves continued for now, with plans for surgery and radiation.   7. Negative PET scan after 6 cycles of TCH/Perjeta. Surgery on the left breast and axilla planned.   8. At the time of surgery, 12/31/2014, the patient h ad ypTisN1 disease. The decision was made to do radiation to the axilla and supraclavicular areas and the liver lesion. Continue Perjeta and Herceptin indefinitely until there are signs of progression.   9. Skin lesion in left chest wall positive breast canc er. CT scans of chest, abdomen and pelvis showed no new disease on 02/10/2015. Radiation to the chest wall. Herceptin and Perjeta to continue until there is clear progression.   10. Stereotactic day radiation to solitary brain lesion completed. The patient remains on a Decadron taper.   11. Isolated brain met, irradiated on 07/10/2015 with focused radiation.    12.  Progressive pulmonary metastasis and left supraclavicular nodes and brain met on 8/9/2016 switched to Kadcyla                   13.  Progressive disease in lymph nodes on Kadcyla switched to Tykerb and  Xeloda in 1/17 14. Patient started on Navelbine and Herceptin                             on 4/17/2017. She was seen through triage                            With reports of increased shortness of                                  breath, nausea and vomiting and                                            intractable pain. She was admitted to [facility]                          for MRI of the brain and radiation oncology                           consult.     History of Present Illness  patient is a 56-year-old lady with metastatic HER-2 positive breast cancer to brain and bones and chest comes in today to review the results of her lymph node biopsy and imaging.  Lymph node biopsy confirmed breast cancer which is still 3+ Her2 positive.  CT of the chest and abdomen shows progressive disease in the left lung and mediastinum and hilum with no metastasis in the abdomen.    SHe has progressed through multiple lines of therapy and was recently disqualified from a clinical trial at [facility]. She started Navelbine with Herceptin on 4/17/2017.    She is seen today through triage with reports of increased shortness of breath. This is particularly worse with expiration and she reports episodes where she feels like she cannot fully expel air.     She is also experiencing significant nausea and vomiting which is not relieved with Compazine and ZOfran. She has been taking prednisone 10mg daily for her shortness of breath biut was unable to take this morning because she is unable to hold anything down.    She reports significant pain in the back lung fields worse on the left side. She also reports intermittent pain all over. She has taken Percocet 10.325 just before coming into the office but this provides her with very little relief.She is and has been hesitant to take pain medication as she was a Hospice worker for 20 years and has strong feelings about this. She also does not like the way narcotics make her feel.    She  denies fevers, chills, diarrhea. She is very weak and required a wheelchair in the office today for mobility. She is unable to stand for a weight.     She denies outright vision changes but does experience ocular migraines and these have worsened over the last couple of days.      Past Medical History:   Diagnosis Date   • Anemia     intermittent which responded to iron.    • Breast cancer     Left, ER/AR negative, HER-2 positive    • Liver metastasis     biopsy proven    • Metastasis to brain     irradiated on 7/10/2015 w/ focus radiation       ONCOLOGIC HISTORY:  (History from previous dates can be found in the separate document.)    Current Outpatient Prescriptions on File Prior to Visit   Medication Sig Dispense Refill   • B COMPLEX VITAMINS PO Take  by mouth daily.     • benzonatate (TESSALON PERLES) 100 MG capsule Take 1 capsule by mouth 3 (Three) Times a Day As Needed for Cough. 90 capsule 3   • HYDROcodone-homatropine (HYCODAN) 5-1.5 MG/5ML syrup Take 5 mL by mouth Every 6 (Six) Hours As Needed for Cough. 473 mL 0   • metroNIDAZOLE (METROGEL) 1 % gel Apply  topically Daily. 60 g 0   • Multiple Vitamins-Minerals (MULTIVITAMIN PO) Take  by mouth daily.     • ondansetron (ZOFRAN) 8 MG tablet Take 1 tablet by mouth Every 8 (Eight) Hours As Needed for nausea or vomiting. 30 tablet 2   • oxyCODONE-acetaminophen (PERCOCET) 5-325 MG per tablet Take 1 tablet by mouth Every 4 (Four) Hours As Needed for Moderate Pain (4-6). 100 tablet 0   • predniSONE (DELTASONE) 10 MG tablet Take 1 tablet by mouth Daily. 60 tablet 3   • sennosides-docusate sodium (SENOKOT-S) 8.6-50 MG tablet Take 2 tablets by mouth Daily. 60 tablet 5   • [DISCONTINUED] ibuprofen (ADVIL,MOTRIN) 200 MG tablet Take 200 mg by mouth Every 6 (Six) Hours As Needed for Mild Pain (1-3). 2 tabs     • [DISCONTINUED] prochlorperazine (COMPAZINE) 10 MG tablet Take 1 tablet by mouth Every 6 (Six) Hours As Needed for nausea or vomiting. 30 tablet 2     No current  facility-administered medications on file prior to visit.        ALLERGIES:     Allergies   Allergen Reactions   • Latex        Social History     Social History   • Marital status:      Spouse name: Evaristo   • Number of children: 2   • Years of education: College     Occupational History   •  [employer]     Social History Main Topics   • Smoking status: Never Smoker   • Smokeless tobacco: Former User   • Alcohol use No   • Drug use: No   • Sexual activity: Not on file     Other Topics Concern   • Not on file     Social History Narrative    She is  and lives with her . She works as a  for [employer].          Cancer-related family history includes Cancer in her other; Cancer (age of onset: 70) in her mother; Uterine cancer in her maternal grandmother.     Review of Systems   Constitutional: Positive for activity change, fatigue and unexpected weight change.   HENT: Negative.    Eyes:        See HPI   Respiratory: Positive for shortness of breath and wheezing.    Cardiovascular: Positive for chest pain.   Gastrointestinal: Positive for abdominal pain, nausea and vomiting.   Endocrine: Negative.    Genitourinary: Negative.    Musculoskeletal: Positive for back pain.   Skin: Positive for pallor.   Neurological:        See HPI   Hematological: Negative.      A comprehensive 14 point review of systems was performed and was negative except as mentioned.    Objective      Vitals:    04/26/17 1113   BP: 151/98   Pulse: 106   Resp: 16   Temp: 98.5 °F (36.9 °C)   SpO2: 92%      Current Status 4/26/2017   ECOG score 0       Physical Exam   Constitutional: She is oriented to person, place, and time. She appears well-developed.   Ill appearing female seen in the infusion area with . She is clearly uncomfortable and shifting frequently.    HENT:   Head: Normocephalic.   Eyes: EOM are normal.   Neck: Normal range of motion.   Cardiovascular: Regular rhythm.     Tachycardic   Pulmonary/Chest:   Decreased throughout entire left lung. Grunting with expiration.   Abdominal: Soft.   Musculoskeletal:   Unable to stand for ling periods of time, unable to stand for weight   Neurological: She is alert and oriented to person, place, and time.   Skin: Skin is warm and dry. There is pallor.         RECENT LABS:  Hematology WBC   Date Value Ref Range Status   04/26/2017 5.19 4.00 - 10.00 10*3/mm3 Final     RBC   Date Value Ref Range Status   04/26/2017 3.39 (L) 3.90 - 5.00 10*6/mm3 Final     Hemoglobin   Date Value Ref Range Status   04/26/2017 11.4 (L) 11.5 - 14.9 g/dL Final     Hematocrit   Date Value Ref Range Status   04/26/2017 32.2 (L) 34.0 - 45.0 % Final     Platelets   Date Value Ref Range Status   04/26/2017 310 150 - 375 10*3/mm3 Final        Assessment/Plan   IMPRESSION:  1. There has been significant worsening in the metastatic breast cancer. She has progressed through multiple lines of therapy and started Navelbine and Herceptin on 4/17/2017.  2. Cancer related pain that is not controlled with Percocet 10. She does not want Morphine and has stated this adamantly.  3. Intractable nausea and vomiting. She is taking Compazine and Zofran. She is also taking Prednisone 10mg daily for shortness of breath but was unable to hold this down this morning.  4. Increased weakness.   5. Hyponatremia. Her sodium today is 123 as opposed to 132 on 4/17/2017.     Assessment/Plan   1.We have provided Mrs Mott with 1000cc'c normal saline today. She recevied 1mg Kytril and 10mg Compazine. We also provided 4mg IV Decadron.    2.We provided the patient with 5/325 Lortab in the office today.     3. Dr. Manley has the opportunity to see the patient today and we have opted to go forward with admission to [facility]. I have ordered a consult to radiation oncology in hoped of radiating the left lung. I have also ordered and MRI of the brain.    4. We will provide pain control with 0.25-0.5mg of  Dilaudid every 3 hours as needed.    5. I have discussed admission with the patient and her  today and they are pleased with the plan of care. The patient's son is graduating college on 5/8/2017 and she as verbalized the importance of attending his graduation.                     Cc:  Provider, No Known

## 2017-04-26 NOTE — TELEPHONE ENCOUNTER
Pt calling bc she is extremely weak. She states she has just gotten weaker and weaker over the last week. She states her pain is a little better. She is drinking good but hardly eating. No diarrhea. She has some nausea but controlled.     Spoke with Juany NP. Juany wants pt to come in now. Will call scheduling to add on pt to see juany and marissa. Spoke with Melanie. Pt will come in asap.

## 2017-04-27 PROBLEM — D72.819 LEUKOCYTOPENIA: Status: ACTIVE | Noted: 2017-01-01

## 2017-04-27 PROBLEM — E87.1 HYPONATREMIA SYNDROME: Status: ACTIVE | Noted: 2017-01-01

## 2017-04-27 PROBLEM — J90 BILATERAL PLEURAL EFFUSION: Status: ACTIVE | Noted: 2017-01-01

## 2017-04-27 PROBLEM — C78.02 MALIGNANT NEOPLASM METASTATIC TO LEFT LUNG (HCC): Status: ACTIVE | Noted: 2017-01-01

## 2017-04-27 PROBLEM — D64.81 ANEMIA ASSOCIATED WITH CHEMOTHERAPY: Status: ACTIVE | Noted: 2017-01-01

## 2017-04-27 PROBLEM — R06.00 DYSPNEA, UNSPECIFIED: Status: ACTIVE | Noted: 2017-01-01

## 2017-04-27 PROBLEM — T45.1X5A ANEMIA ASSOCIATED WITH CHEMOTHERAPY: Status: ACTIVE | Noted: 2017-01-01

## 2017-04-27 NOTE — PROGRESS NOTES
Patient came over to the radiation oncology department for CT simulation, and we found that she has complete opacification of the left lung -  looks like a complete effusion and some moderate fluid on the right side.  I contacted Dr. Sanderson as he is following in house for the [facility] group today and he plans I believe a diagnostic CT today and a pulmonary referral.  We can't plan or treat until this particular situation gets corrected.

## 2017-04-30 PROBLEM — D70.1 CHEMOTHERAPY INDUCED NEUTROPENIA (HCC): Status: ACTIVE | Noted: 2017-01-01

## 2017-04-30 PROBLEM — T45.1X5A CHEMOTHERAPY INDUCED NEUTROPENIA (HCC): Status: ACTIVE | Noted: 2017-01-01

## 2017-05-09 PROBLEM — C50.919 MALIGNANT NEOPLASM OF OTHER SPECIFIED SITES OF FEMALE BREAST: Status: ACTIVE | Noted: 2017-01-01

## 2017-05-23 PROBLEM — G89.3 CANCER-RELATED PAIN: Status: ACTIVE | Noted: 2017-01-01

## 2017-06-02 PROBLEM — R07.81 PLEURITIC CHEST PAIN: Status: ACTIVE | Noted: 2017-01-01

## 2017-06-02 PROBLEM — I31.39 PERICARDIAL EFFUSION WITHOUT CARDIAC TAMPONADE: Status: ACTIVE | Noted: 2017-01-01

## 2017-06-02 NOTE — CONSULTS
[facility]  Cardiology Consult Note  .  Patient Identification:  Name: Yury Mott  Age: 56 y.o.  Sex: female  :  1960  MRN: [id number]            Requesting Physician: Dr Lynne    Reason for Consultation: evaluate pericardial effusion       HPI  This is a 56-year-old white female, with no prior cardiac history, with a history for breast cancer, chemotherapy, anemia, who presents for after undergoing routine echocardiogram in our office found to have pericardial effusion.  Dr. Perez spoke with attending and recommendation for admission to this hospital for further evaluation treatment.  Information obtained from medical record, patient.  She states it's been undergoing chemotherapy for approximately 3 years for breast cancer and presented to our office for routine echo.  She states for the past 2 days has had chest pain.  She describes it chest pain as pressure, substernal, pain level 5/10, comes and goes.  Worsens with taking deep breath and position while lying flat makes it worse with shortness of breath.  No associated nausea, diaphoresis, radiation .  Does not take any medicine for it.  No palpitations, dizziness, swelling.  Has unintentional progressive weight loss down by 13 pounds and last 3 weeks or so she thinks.  She is eating.  She is homebound because has no energy other than going for doctor visits.  She has good appetite today she says because has not eaten yet.    Cardiac risk factors: No diabetes.  No hypertension.  Unknown cholesterol.  Nonsmoker.  No family history for early coronary artery disease.    States no prior history Yunior told heart attack or undergone any heart procedure, stenting or heart surgery.  No recent fever chills sweats.      Past Medical History:  Past Medical History:   Diagnosis Date   • Anemia     intermittent which responded to iron.    • Breast cancer     Left, ER/NE negative, HER-2 positive    • Liver metastasis      biopsy proven    • Metastasis to brain     irradiated on 7/10/2015 w/ focus radiation     Past Surgical History:  Past Surgical History:   Procedure Laterality Date   • MASTECTOMY Left 12/30/2014   • PLEURAL CATHETER INSERTION Left 5/1/2017    Procedure: PLEURX CATHETER INSERTION;  Surgeon: Asael Arriaga MD;  Location: [facility];  Service:    • PORTACATH PLACEMENT  06/2014      Current Meds:     Current Facility-Administered Medications:   •  bisacodyl (DULCOLAX) EC tablet 10 mg, 10 mg, Oral, Daily PRN, Chester Lynne MD  •  fentaNYL (DURAGESIC) 25 MCG/HR patch 1 patch, 1 patch, Transdermal, Q72H, Chester Lynne MD  •  Glycerin-Hypromellose- (ARTIFICIAL TEARS) 0.2-0.2-1 % ophthalmic solution solution 1 drop, 1 drop, Both Eyes, Q1H PRN, Chester Lynne MD  •  ondansetron (ZOFRAN) tablet 8 mg, 8 mg, Oral, Q8H PRN, Chester Lynne MD  •  prochlorperazine (COMPAZINE) tablet 10 mg, 10 mg, Oral, Q6H PRN, Chester Lynne MD  •  promethazine (PHENERGAN) tablet 12.5 mg, 12.5 mg, Oral, Q6H PRN, Chester Lynne MD  •  Scopolamine (TRANSDERM-SCOP) 1.5 MG/3DAYS patch 1 patch, 1 patch, Transdermal, Q72H, Chester Lynne MD  •  sennosides-docusate sodium (SENOKOT-S) 8.6-50 MG tablet 2 tablet, 2 tablet, Oral, Daily, Chester Lynne MD    Allergies:  Allergies   Allergen Reactions   • Latex Rash     Social History:   Social History   Substance Use Topics   • Smoking status: Never Smoker   • Smokeless tobacco: Former User   • Alcohol use No      Family History:  Family History   Problem Relation Age of Onset   • Uterine cancer Maternal Grandmother      mid 50's   • Cancer Other    • Other Other      Cardiac disorder   • Cancer Mother 70     Face   • Anemia Mother    • Heart disease Father         All systems were reviewed and negative except for:  Constitution:  positive for fatigue and weight loss  Cardiovascular: positive for  chest pressure / pain, at rest, orthopnea, paroxysmal nocturnal dyspnea and  "cough  Review of systems:  Pertenant positives are as mentioned in the HPI and all the other    review of systems are negative    Objective:  /79 (BP Location: Right arm, Patient Position: Lying)  Pulse (!) 127  Temp 98.1 °F (36.7 °C) (Oral)   Resp 20  Ht 67\" (170.2 cm)  Wt 123 lb 4.8 oz (55.9 kg)  SpO2 96%  BMI 19.31 kg/m2            Physical Examination: By     Physical Exam      General: In mild respiratroy distress, well developed, well nourished    Skin: Warm and dry, no diaphoresis noted;    no pallor or cyanosis   HEENT: Normal Pupills; no conjunctiva erythema; external ear and nose normal; oral mucosa moist, no xanthelasma, lips not cyanotic   Neck: Supple; no carotid bruits; mild  JVD; thyroid not enlarged. Trachea mid line    Heart: [place] not palpable, S1S2  regular rate and rhythm; no murmurs, no rubs or gallops are auscultated   Chest: Respirations regular, unlabored at rest, left breath sounds have markedly diecreased air entry throughout all lung fields; no crackles,  or wheezes auscultated.     Abdomen: Soft, non-tender, non-distended, positive bowel sounds, no hepatomegaly, No Bruit   Extremities: Bilateral lower extremities have no pre-tibial pitting edema; Radials pulses are palpable   Musculoskeletal:  Gait and station; in bed rest     No clubbing of the fingers   Neurological/  Psychological:  Alert and oriented; no new  motor deficits; speech and behavior appropriate;     Rest of the exam is stable     Lab Review:  Personally reviewed the labs, radiology imaging and other cardiac procedures.      Results from last 7 days  Lab Units 05/30/17  0848   SODIUM mmol/L 136   POTASSIUM mmol/L 4.1   CHLORIDE mmol/L 95*   TOTAL CO2 mmol/L 29.9*   BUN mg/dL 19   CREATININE mg/dL 0.68   CALCIUM mg/dL 8.8   BILIRUBIN mg/dL 0.6   ALK PHOS U/L 186*   ALT (SGPT) U/L 55*   AST (SGOT) U/L 39*   GLUCOSE mg/dL 124         @LABRCNTbnp@    Results from last 7 days  Lab Units 05/30/17  0848 "   WBC 10*3/mm3 6.17   HEMOGLOBIN g/dL 11.0*   HEMATOCRIT % 35.2   PLATELETS 10*3/mm3 398*             @LABRCNTIP(chol,trig,hdl,ldl)    I personally viewed and interpreted the patient's EKG/Telemetry data    ECG: ordered  Echocardiogram:  Done at \[facility]\"" office today, report not available    Past echo 2/28/2017  Interpretation Summary      · Left ventricular function is normal. Calculated EF = 64%. Estimated EF was in agreement with the calculated EF. Normal left ventricular cavity size and wall thickness noted. All left ventricular wall segments contract normally.  · Left ventricular diastolic function is normal.  · There is a trivial pericardial effusion adjacent to the right ventricle.  · No significant valvular stenosis or insufficiency.       XRAY:ordered    )  Imaging Results (last 24 hours)     ** No results found for the last 24 hours. **             Patient Active Problem List   Diagnosis   • Breast cancer metastasized to brain   • Breast cancer metastasized to liver   • Encounter for long-term (current) use of high-risk medication   • Fitting and adjustment of vascular catheter   • Intractable pain   • Nausea and vomiting   • Malignant neoplasm metastatic to left lung   • Leukocytopenia   • Anemia associated with chemotherapy   • Dyspnea, unspecified   • Hyponatremia syndrome   • Bilateral pleural effusion   • Chemotherapy induced neutropenia   • Malignant neoplasm of other specified sites of female breast   • Cancer-related pain          Assessment/ Plan :  Chest pain-×2 days, comes and goes, has very mild chest discomfort now.  No acute distress.  Wearing NC O2.  Will obtain laboratory studies for troponin and EKG.  Obtain labs: ProBNP, TSH, free T4. This is more likely due to pericardial effusion  Takes chemotherapy    Pericardial effusion by echo done in our office today,.  No cardiac tamponade clinically with no pulsus paradoxus. She has mild collapse of RA and RV in late diastole,   tachycardic, mild 103.  " No ectopy.  Blood pressure controlled 120/70's.    Cough- \"cancer cough\" worsening in the last week or so.  Looks like left pleurax cathetor is clogged with large left effusion    Obtain lipid profile in a.m.  Further recommendations to follow, discussed with and ongoing management by Dr. SAMARA Perez-Linh Irvin APRN    Will keep NPO and will discuss with Thoracic surgery in AM regarding treatment plan. Due to malignant effusion, pericardial window will be better option    Mg Perez MD,[facility]  6/2/2017, 2:36 PM    Patient personally interviewed and above subjective findings personally confirmed during a face to face contact with patient today, and I personally performed the  physical examination.  All labs, cardiac procedures,pertinent radiographs of the last 24 hours personally reviewed, Impression and plans discussed/elaborated and implemented by me or jointly as described above -----------Mg Perez MD        "

## 2017-06-02 NOTE — CONSULTS
Adult Nutrition  Assessment/PES    Patient Name:  Yury Mott  YOB: 1960  MRN: [id number]  Admit Date:  6/2/2017    Assessment Date:  6/2/2017     Comments:  Assessment completed, weight loss noted, added ensure enlive to meals and small between meal snacks to boost/supplement nutrient intake, follow per protocol.        Reason for Assessment       06/02/17 1552    Reason for Assessment    Reason For Assessment/Visit admission assessment    Identified At Risk By Screening Criteria unintentional loss of 10 lbs or more in the past 2 mos;MST SCORE 2+    Diagnosis Diagnosis    Gastrointestinal Vomiting;Nausea    Hematological Anemia   assoc. w/chemo    Oncology Breast cancer   mets to bone and liver              Nutrition/Diet History       06/02/17 1554    Nutrition/Diet History    Patient Reported Diet/Restrictions/Preferences regular    Supplemental Drinks/Foods/Additives takes ensure at home, at least once daily            Anthropometrics       06/02/17 1554    Anthropometrics    RD Documented Current Weight  55.8 kg (123 lb)    RD Documented Weight on Admission 55.8 kg (123 lb)    Usual Body Weight (UBW)    Weight Loss 5.897 kg (13 lb)    % Weight Loss  11 %    Weight Loss Time Frame 3-4 weeks with recent illness    Body Mass Index (BMI)    BMI Grade 19.1 - 24.9 - normal            Labs/Tests/Procedures/Meds       06/02/17 1555    Labs/Tests/Procedures/Meds    Diagnostic Test/Procedure Review reviewed    Labs/Tests Review Reviewed;Na+;K+;Creat;BUN;Glucose;Platlets;ALT;Alb    Medication Review Reviewed, pertinent;Laxative    Significant Vitals reviewed, pertinent            Physical Findings       06/02/17 1556    Physical Findings/Assessment    Additional Documentation Physical Appearance (Group)    Physical Appearance    Overall Physical Appearance loss of muscle mass;underweight    Skin --   intact            Estimated/Assessed Needs       06/02/17 1556    Calculation Measurements    Weight Used  "For Calculations 55.8 kg (123 lb)    Height Used for Calculations 1.702 m (5' 7\")    Estimated/Assessed Energy Needs    [facility]    kcal/kg 40    40 Kcal/Kg (kcal) 2231.68    Age 56    RMR ([facility] Equation) 1175.54    Activity Factors ([facility])  Out of bed, ambulatory  1.3   + 500 Kcal for weight gain    Total estimated needs ([facility]) 2029    Estimated Kcal Range  7089-2512    Estimated/Assessed Protein Needs    Weight Used for Protein Calculation 55.8 kg (123 lb)    Protein (gm/kg) 1.3    1.3 Gm Protein (gm) 72.53    Estimated Protein Range 67-74    Estimated/Assessed Fluid Needs    Fluid Need Method RDA method    RDA Method (mL)  1953            Nutrition Prescription Ordered       06/02/17 1558    Nutrition Prescription PO    Current PO Diet Regular            Evaluation of Received Nutrient/Fluid Intake       06/02/17 1558    PO Evaluation    Number of Days PO Intake Evaluated Insufficient Data   just admitted              Malnutrition Severity Assessment       06/02/17 1558    Malnutrition Severity Assessment    Malnutrition Type Chronic Illness Malnutrition    Weight Status (Chronic)    %UBW Mild (86-90%)    Weight Loss Severe (>5% / 1 mo)    Energy Intake Status (Chronic)    Energy Intake Mod (<75% / > or equal to 1 mo)    Criteria Met (Must meet criteria for severity in at least 2 of these categories: M Wasting, Fat Loss, Fluid, Secondary Signs, Wt. Status, Intake)    Patient meets criteria for  Moderate malnutrition          Problem/Interventions:        Problem 1       06/02/17 1600    Nutrition Diagnoses Problem 1    Problem 1 Unintended Weight Loss    Etiology (related to) Medical Diagnosis    Gastrointestinal Vomiting;Nausea    Oncology Breast cancer    Signs/Symptoms (evidenced by) Report/Observation    Reported/Observed By Patient    Appetite Poor    Mental State/Condition Discomfort;Weakness    Reported GI Symptoms Nausea and vomiting    " Best Intake Of Supplements                    Intervention Goal       06/02/17 1601    Intervention Goal    General Maintain nutrition;Reduce/improve symptoms;Meet nutritional needs for age/condition;Disease management/therapy    PO Establish PO;Tolerate PO;PO intake (%)    PO Intake % 75 %    Weight Maintain weight            Nutrition Intervention       06/02/17 1601    Nutrition Intervention    RD/Tech Action Advise alternate selection;Advise available snack;Interview for preference;Encourage intake;Await begin PO;Recommend/ordered;Supplement offered/refused;Follow Tx progress;Care plan reviewd    Recommended/Ordered Supplement;Snack            Nutrition Prescription       06/02/17 1601    Nutrition Prescription PO    PO Prescription Begin/change supplement    Supplement Ensure Enlive    Supplement Frequency 3 times a day;Snack time    Snack Times 10AM;2PM;Bedtime    New PO Prescription Ordered? Yes            Education/Evaluation       06/02/17 1602    Education    Education Will Instruct as appropriate    Monitor/Evaluation    Monitor Per protocol            Electronically signed by:  Barbara Seth RD  06/02/17 4:05 PM

## 2017-06-02 NOTE — PLAN OF CARE
Problem: Patient Care Overview (Adult)  Goal: Plan of Care Review  Outcome: Ongoing (interventions implemented as appropriate)    06/02/17 1335   Coping/Psychosocial Response Interventions   Plan Of Care Reviewed With patient;spouse   Patient Care Overview   Progress no change   Outcome Evaluation   Outcome Summary/Follow up Plan direct admit today, had ECHO showing fluid around heart, pt c/o cp. pt with hx CA with mets., has productive cough, suction placed to bedside.call placed to IVT to access port. awaiting orders from dr combs. will continue to monitor.         Problem: Acute Coronary Syndrome (ACS) (Adult)  Goal: Signs and Symptoms of Listed Potential Problems Will be Absent or Manageable (Acute Coronary Syndrome)  Outcome: Ongoing (interventions implemented as appropriate)    06/02/17 1335   Acute Coronary Syndrome (ACS)   Problems Assessed (Acute Coronary Syndrome (ACS)) all   Problems Present (Acute Coronary Syndrome (ACS)) chest pain (angina);situational response         Problem: Respiratory Insufficiency (Adult)  Goal: Identify Related Risk Factors and Signs and Symptoms    06/02/17 1335   Respiratory Insufficiency   Related Risk Factors (Respiratory Insufficiency) activity intolerance;medication effects;pain   Signs and Symptoms (Respiratory Insufficiency) abnormal breath sounds;blood pressure/heart rate changes;breathing pattern changes;chest pain;cough (productive/ineffective);decreased oxygen saturation;dyspnea;respiratory rate alterations;shortness of breath       Goal: Acid/Base Balance  Outcome: Ongoing (interventions implemented as appropriate)    06/02/17 1335   Respiratory Insufficiency (Adult)   Acid/Base Balance making progress toward outcome       Goal: Effective Ventilation  Outcome: Ongoing (interventions implemented as appropriate)    06/02/17 1335   Respiratory Insufficiency (Adult)   Effective Ventilation making progress toward outcome         Problem: Fall Risk (Adult)  Goal: Identify  Related Risk Factors and Signs and Symptoms  Outcome: Outcome(s) achieved Date Met:  06/02/17 06/02/17 1338   Fall Risk   Fall Risk: Related Risk Factors confusion/agitation;depression/anxiety;gait/mobility problems;objects hard to reach;sensory deficits   Fall Risk: Signs and Symptoms presence of risk factors       Goal: Absence of Falls  Outcome: Ongoing (interventions implemented as appropriate)    06/02/17 3945   Fall Risk (Adult)   Absence of Falls making progress toward outcome

## 2017-06-02 NOTE — CONSULTS
Inpatient Consult to Cardiothoracic Surgery  Consult performed by: LOCO KEN  Consult ordered by: NILS HECTOR  Reason for consult: Left PleurX catheter care  Assessment/Recommendations: Activase administered into left PleurX catheter today at 1545.  Going to radiology now for CXR pa and lateral.  Prefer to wait until tomorrow to drain PleurX but may drain today after letting Activase dwell for at least 2 hours if increased left pleural effusion present on today's CXR.            Patient Care Team:  Ted Manley MD as PCP - General (Hematology and Oncology)  eTd Manley MD as Consulting Physician (Hematology and Oncology)  Steven Barker MD as Referring Physician (Breast Surgery)    Chief Compliant:  Shortness of breath    Subjective     History of Present Illness  Yury oMtt known to our service due to recent left PleurX catheter insertion related to malignant pleural effusion which was placed on 5/1/17.  She is a 56-year-old female with a history of metastatic breast cancer that has been treated with several tiers of chemotherapy but has had progressive disease despite this. She has had problems with worsening shortness of breath.  She was being seen in Dr. Perez's office today and underwent echocardiogram which showed pericardial effusion.  She was admitted for further evaluation.  She also has been draining her left PleurX catheter three times per week and the last 2 episodes, she has had only about 10 cc of fluid removed.  Last week she had approximately 300 ml of fluid removed on Monday and it slowly started to decrease to about 150 ml.  Since then, she has had worsening shortness of breath, chest pain, fatigue, and dry cough.  She denies any fever or hemoptysis.      Review of Systems   Constitutional: Positive for activity change, appetite change and fatigue.   HENT: Negative.    Respiratory: Positive for cough and shortness of breath. Negative for wheezing.     Cardiovascular: Positive for chest pain.   Gastrointestinal: Negative.    Endocrine: Negative.    Genitourinary: Negative.    Musculoskeletal: Negative.    Skin: Negative.    Neurological: Negative.    Hematological: Negative.    Psychiatric/Behavioral: Negative.         Patient Active Problem List   Diagnosis   • Breast cancer metastasized to brain   • Breast cancer metastasized to liver   • Encounter for long-term (current) use of high-risk medication   • Fitting and adjustment of vascular catheter   • Intractable pain   • Nausea and vomiting   • Malignant neoplasm metastatic to left lung   • Leukocytopenia   • Anemia associated with chemotherapy   • Dyspnea, unspecified   • Hyponatremia syndrome   • Bilateral pleural effusion   • Chemotherapy induced neutropenia   • Malignant neoplasm of other specified sites of female breast   • Cancer-related pain     Past Medical History:   Diagnosis Date   • Anemia     intermittent which responded to iron.    • Breast cancer     Left, ER/OK negative, HER-2 positive    • Liver metastasis     biopsy proven    • Metastasis to brain     irradiated on 7/10/2015 w/ focus radiation     Past Surgical History:   Procedure Laterality Date   • MASTECTOMY Left 12/30/2014   • PLEURAL CATHETER INSERTION Left 5/1/2017    Procedure: PLEURX CATHETER INSERTION;  Surgeon: Asael Arriaga MD;  Location: [facility];  Service:    • PORTACATH PLACEMENT  06/2014     Family History   Problem Relation Age of Onset   • Uterine cancer Maternal Grandmother      mid 50's   • Cancer Other    • Other Other      Cardiac disorder   • Cancer Mother 70     Face   • Anemia Mother    • Heart disease Father      Social History     Social History   • Marital status:      Spouse name: Evaristo   • Number of children: 2   • Years of education: College     Occupational History   •  Hosp[employer]     Social History Main Topics   • Smoking status: Never Smoker   • Smokeless  tobacco: Former User   • Alcohol use No   • Drug use: No   • Sexual activity: Defer     Other Topics Concern   • Not on file     Social History Narrative    She is  and lives with her . She works as a  for [employer].      Prescriptions Prior to Admission   Medication Sig Dispense Refill Last Dose   • bisacodyl (DULCOLAX) 5 MG EC tablet Take 10 mg by mouth Daily As Needed for Constipation (PT STS TAKES 2-3 PPRN).      • Glycerin-Hypromellose- (ARTIFICIAL TEARS) 0.2-0.2-1 % solution ophthalmic solution Administer 1 drop to both eyes Every 1 (One) Hour As Needed for Dry Eyes. 1 bottle 2 Past Week at Unknown time   • lactulose (CHRONULAC) 10 GM/15ML solution Take 30 mL by mouth Daily. (Patient taking differently: Take 20 g by mouth Daily As Needed.) 300 mL 1 Past Week at Unknown time   • ondansetron (ZOFRAN) 8 MG tablet Take 1 tablet by mouth Every 8 (Eight) Hours As Needed for nausea or vomiting. 30 tablet 2 Past Week at Unknown time   • prochlorperazine (COMPAZINE) 10 MG tablet Take 10 mg by mouth Every 6 (Six) Hours As Needed for Nausea or Vomiting.   Past Week at Unknown time   • promethazine (PHENERGAN) 12.5 MG tablet Take 1 tablet by mouth Every 6 (Six) Hours As Needed for Nausea or Vomiting. 40 tablet 1 Past Week at Unknown time   • Scopolamine (TRANSDERM-SCOP, 1.5 MG,) 1.5 MG/3DAYS patch Place 1 patch on the skin Every 72 (Seventy-Two) Hours. (Patient taking differently: Place 1 patch on the skin Every 72 (Seventy-Two) Hours. DUE TO BE CHANGED 06/03/2017) 24 patch 3 5/31/2017 at Unknown time   • sennosides-docusate sodium (SENOKOT-S) 8.6-50 MG tablet Take 2 tablets by mouth Daily. 60 tablet 5 Past Month at Unknown time   • fentaNYL (DURAGESIC) 25 MCG/HR patch Place 1 patch on the skin Every 72 (Seventy-Two) Hours. (Patient taking differently: Place 1 patch on the skin Every 72 (Seventy-Two) Hours. DUE TO BE CHANGED 06/03/2017) 10 patch 0 5/31/2017     Allergies   Allergen  Reactions   • Latex Rash       Objective      Vital Signs  Temp:  [98.1 °F (36.7 °C)] 98.1 °F (36.7 °C)  Heart Rate:  [127] 127  Resp:  [20] 20  BP: ()/(64-79) 121/79    Intake & Output (last day)       06/01 0701 - 06/02 0700 06/02 0701 - 06/03 0700          Unmeasured Urine Occurrence  1 x          Physical Exam   Constitutional: She is oriented to person, place, and time. She has a sickly appearance.   HENT:   Head: Normocephalic and atraumatic. Hair is abnormal.   Scarf in place to head   Neck: Normal range of motion. Neck supple.   Cardiovascular: Regular rhythm, normal heart sounds and intact distal pulses.  Tachycardia present.    No murmur heard.  Pulmonary/Chest: Effort normal. She has decreased breath sounds in the left middle field and the left lower field. She has no wheezes. She has no rhonchi. She has no rales. She exhibits no tenderness.   Right lung clear. Frequent non-productive cough noted during exam.     Abdominal: Soft. There is no tenderness.   Musculoskeletal: Normal range of motion. She exhibits no edema or tenderness.   Neurological: She is alert and oriented to person, place, and time. She has normal strength.   Skin: Skin is warm and dry. No rash noted. No cyanosis or erythema.   Psychiatric: She has a normal mood and affect. Her behavior is normal. Judgment normal. Cognition and memory are normal.       Results Review:    I reviewed the patient's new clinical results.    Imaging Results (last 24 hours)     ** No results found for the last 24 hours. **        *CXR PA and lateral pending    Lab Results:  Lab Results (last 24 hours)     Procedure Component Value Units Date/Time    CBC & Differential [[id number]] Collected:  06/02/17 1432    Specimen:  Blood Updated:  06/02/17 1445    Narrative:       The following orders were created for panel order CBC & Differential.  Procedure                               Abnormality         Status                     ---------                                -----------         ------                     CBC Auto Differential[[id number]]        Abnormal            Final result                 Please view results for these tests on the individual orders.    CBC Auto Differential [[id number]]  (Abnormal) Collected:  06/02/17 1432    Specimen:  Blood Updated:  06/02/17 1445     WBC 7.52 10*3/mm3      RBC 3.86 (L) 10*6/mm3      Hemoglobin 11.3 (L) g/dL      Hematocrit 35.0 (L) %      MCV 90.7 fL      MCH 29.3 pg      MCHC 32.3 (L) g/dL      RDW 17.3 (H) %      RDW-SD 57.1 (H) fl      MPV 9.1 fL      Platelets 354 10*3/mm3      Neutrophil % 91.4 (H) %      Lymphocyte % 7.8 (L) %      Monocyte % 0.8 (L) %      Eosinophil % 0.0 (L) %      Basophil % 0.0 %      Immature Grans % 0.0 %      Neutrophils, Absolute 6.87 10*3/mm3      Lymphocytes, Absolute 0.59 (L) 10*3/mm3      Monocytes, Absolute 0.06 (L) 10*3/mm3      Eosinophils, Absolute 0.00 10*3/mm3      Basophils, Absolute 0.00 10*3/mm3      Immature Grans, Absolute 0.00 10*3/mm3     Troponin [[id number]]  (Normal) Collected:  06/02/17 1432    Specimen:  Blood Updated:  06/02/17 1511     Troponin T <0.010 ng/mL     Narrative:       Troponin T Reference Ranges:  Less than 0.03 ng/mL:    Negative for AMI  0.03 to 0.09 ng/mL:      Indeterminant for AMI  Greater than 0.09 ng/mL: Positive for AMI    BNP [[id number]]  (Normal) Collected:  06/02/17 1432    Specimen:  Blood Updated:  06/02/17 1522     proBNP 173.0 pg/mL     Narrative:       Among patients with dyspnea, NT-proBNP is highly sensitive for the detection of acute congestive heart failure. In addition NT-proBNP of <300 pg/ml effectively rules out acute congestive heart failure with 99% negative predictive value.    TSH [[id number]]  (Normal) Collected:  06/02/17 1432    Specimen:  Blood Updated:  06/02/17 1522     TSH 0.437 mIU/mL     T4, Free [[id number]]  (Normal) Collected:  06/02/17 1432    Specimen:  Blood Updated:  06/02/17 1522     Free T4 1.17 ng/dL      Comprehensive Metabolic Panel [[id number]]  (Abnormal) Collected:  06/02/17 1432    Specimen:  Blood Updated:  06/02/17 1537     Glucose 126 (H) mg/dL      BUN 15 mg/dL      Creatinine 0.56 (L) mg/dL      Sodium 135 (L) mmol/L      Potassium 4.5 mmol/L      Chloride 96 (L) mmol/L      CO2 25.4 mmol/L      Calcium 8.7 mg/dL      Total Protein 5.8 (L) g/dL      Albumin 2.80 (L) g/dL      ALT (SGPT) 49 (H) U/L      AST (SGOT) 45 (H) U/L      Alkaline Phosphatase 174 (H) U/L      Total Bilirubin 0.9 mg/dL      eGFR Non African Amer 112 mL/min/1.73      Globulin 3.0 gm/dL      A/G Ratio 0.9 g/dL      BUN/Creatinine Ratio 26.8 (H)     Anion Gap 13.6 mmol/L               Assessment/Plan     Active Problems:    Malignant pleural effusion        Assessment:    Condition: In serious condition.  Unchanged.   (Malignant pleural effusion  Pericardial effusion  History of breast cancer  ).     Plan:   (Due to symptoms of shortness of breath, decreased breath sounds on left side, and recent decrease in pleural fluid drainage from PleurX, I went ahead and administered Activase 2mg into PleurX catheter today around 1545.  She then went down to radiology for CXR PA and lateral.  Plan to have PleurX drained tomorrow if able.  If increased left pleural effusion, may drain PleurX today but would prefer to wait at least 2 hours after Activase.  IV nurse consulted to access Power port.  Cardiology following concerning pericardial effusion.  ).       I discussed the patients findings and our recommendations with patient, family and nursing staff    Thank you for this consult and allowing us to participate in the care of your patient.  We will follow along with you during this hospitalization.       Yokasta Luo, APRN  06/02/17  4:02 PM

## 2017-06-02 NOTE — H&P
HISTORY AND PHYSICAL   [facility], [facility]      2017    Patient Identification:    Name: Yury oMtt  Age: 56 y.o.  Sex: female  :  1960  MRN: [id number]                       Primary Care Physician: Ted Manley MD    Chief Complaint:      Chest pain and SOB      History of Present Illness:     Patient is a 57 y/o female, with h/o metastatic breast cancer, with metastasis to brain, lungs, pleura, liver, apparently progressing despite multiple previous chemotherapy regimens, but now with some response to current regimen. . She does have a malignant left pleural effusion that has caused SOB before and a PleuRx was placed at the end of 2017. She was found to have pericardial effusion and was then seen by Dr. Perez, she was complaining of chest pain, pressure-like, that comes and goes, pain gets worse with deep inspiration. No radiation of the pain, no other symptoms associated with it (Nausea, vomiting, diaphoresis, dizziness), she does have SOB and severe fatigue and weight loss. Due to the severity of the symptoms, she was admitted for further management.    Past Medical History:  Past Medical History:   Diagnosis Date   • Anemia     intermittent which responded to iron.    • Breast cancer     Left, ER/UT negative, HER-2 positive    • Liver metastasis     biopsy proven    • Metastasis to brain     irradiated on 7/10/2015 w/ focus radiation     Past Surgical History:  Past Surgical History:   Procedure Laterality Date   • MASTECTOMY Left 2014   • PLEURAL CATHETER INSERTION Left 2017    Procedure: PLEURX CATHETER INSERTION;  Surgeon: Asael Arriaga MD;  Location: [facility];  Service:    • PORTACATH PLACEMENT  2014      Home Meds:  Prescriptions Prior to Admission   Medication Sig Dispense Refill Last Dose   • bisacodyl (DULCOLAX) 5 MG EC tablet Take 10 mg by mouth Daily As Needed for Constipation (PT STS TAKES 2-3 PPRN).      • Glycerin-Hypromellose-PEG  400 (ARTIFICIAL TEARS) 0.2-0.2-1 % solution ophthalmic solution Administer 1 drop to both eyes Every 1 (One) Hour As Needed for Dry Eyes. 1 bottle 2 Past Week at Unknown time   • lactulose (CHRONULAC) 10 GM/15ML solution Take 30 mL by mouth Daily. (Patient taking differently: Take 20 g by mouth Daily As Needed.) 300 mL 1 Past Week at Unknown time   • ondansetron (ZOFRAN) 8 MG tablet Take 1 tablet by mouth Every 8 (Eight) Hours As Needed for nausea or vomiting. 30 tablet 2 Past Week at Unknown time   • prochlorperazine (COMPAZINE) 10 MG tablet Take 10 mg by mouth Every 6 (Six) Hours As Needed for Nausea or Vomiting.   Past Week at Unknown time   • promethazine (PHENERGAN) 12.5 MG tablet Take 1 tablet by mouth Every 6 (Six) Hours As Needed for Nausea or Vomiting. 40 tablet 1 Past Week at Unknown time   • Scopolamine (TRANSDERM-SCOP, 1.5 MG,) 1.5 MG/3DAYS patch Place 1 patch on the skin Every 72 (Seventy-Two) Hours. (Patient taking differently: Place 1 patch on the skin Every 72 (Seventy-Two) Hours. DUE TO BE CHANGED 06/03/2017) 24 patch 3 5/31/2017 at Unknown time   • sennosides-docusate sodium (SENOKOT-S) 8.6-50 MG tablet Take 2 tablets by mouth Daily. 60 tablet 5 Past Month at Unknown time   • fentaNYL (DURAGESIC) 25 MCG/HR patch Place 1 patch on the skin Every 72 (Seventy-Two) Hours. (Patient taking differently: Place 1 patch on the skin Every 72 (Seventy-Two) Hours. DUE TO BE CHANGED 06/03/2017) 10 patch 0 5/31/2017       Allergies:  Allergies   Allergen Reactions   • Latex Rash     Immunizations:    There is no immunization history on file for this patient.  Social History:   Social History     Social History Narrative    She is  and lives with her . She works as a  for [employer].      Social History   Substance Use Topics   • Smoking status: Never Smoker   • Smokeless tobacco: Former User   • Alcohol use No     Family History:  Family History   Problem Relation Age of Onset   • Uterine  "cancer Maternal Grandmother      mid 50's   • Cancer Other    • Other Other      Cardiac disorder   • Cancer Mother 70     Face   • Anemia Mother    • Heart disease Father         Review of Systems  See history of present illness and past medical history.    Patient denies headache, dizziness, syncope, falls, trauma, change in vision, change in hearing, change in taste, focal weakness, numbness, or paresthesia.    Patient denies chest pain, palpitations, dyspnea, orthopnea, PND, cough, sinus pressure, rhinorrhea, epistaxis, hemoptysis, nausea, vomiting,hematemesis, diarrhea, constipation or hematchezia.    Denies cold or heat intolerance, polydipsia, polyuria, polyphagia.   Denies hematuria, pyuria, dysuria, hesitancy, frequency or urgency.   Denies consumption of raw and under cooked meats foods or change in water source.  Denies fever, chills, sweats, night sweats.    Denies missing any routine medications.   Remainder of ROS is negative.    Objective:    Exam:    tMax 24 hrs: Temp (24hrs), Av.2 °F (36.8 °C), Min:98.1 °F (36.7 °C), Max:98.2 °F (36.8 °C)    Vitals Ranges:   Temp:  [98.1 °F (36.7 °C)-98.2 °F (36.8 °C)] 98.2 °F (36.8 °C)  Heart Rate:  [108-127] 108  Resp:  [20-22] 22  BP: ()/(64-85) 124/85    /85 (BP Location: Right arm, Patient Position: Lying)  Pulse 108  Temp 98.2 °F (36.8 °C) (Oral)   Resp 22  Ht 67\" (170.2 cm)  Wt 123 lb 4.8 oz (55.9 kg)  SpO2 97%  BMI 19.31 kg/m2    General: Alert, oriented x 3 . Cooperative, no distress while on O2, appears older than stated age  HEENT:    Head: Normocephalic, without obvious abnormality, atraumatic  Eyes: EOM are normal. Pupils are equal, round, and reactive to light.   Oropharynx: Mucosa and tongue normal  Neck: Supple, symmetrical, trachea midline, no adenopathy;              thyroid:  no enlargement/tenderness/nodules;              no carotid bruit or JVD  Cardiovascular: Tachycardic, Tachypnea, regular rhythm and intact distal " pulses.              Exam reveals no gallop and no friction rub. No murmur or gallops.  Chest wall: TTP in left side  Pulmonary: Diminished BS in left side, rhonchi left side. Diminished BS right base. No crackles or wheezing.  Abdominal: Soft. non-tender, bowel sounds active all four quadrants,     no masses, no hepatomegaly, no splenomegaly.   Extremities: Normal, atraumatic, no cyanosis or edema  Pulses: 2 + symmetric all extremities  Neurological: He is alert and oriented to person, place, and time.                 CNII-XII intact, normal strength, sensation intact throughout  Skin: Skin color, texture, turgor normal, no rashes or lesions      Data Review:      Results from last 7 days  Lab Units 06/02/17  1432 05/30/17  0848   WBC 10*3/mm3 7.52 6.17   HEMOGLOBIN g/dL 11.3* 11.0*   HEMATOCRIT % 35.0* 35.2   PLATELETS 10*3/mm3 354 398*         Results from last 7 days  Lab Units 06/02/17  1432 05/30/17  0848   SODIUM mmol/L 135* 136   POTASSIUM mmol/L 4.5 4.1   CHLORIDE mmol/L 96* 95*   TOTAL CO2 mmol/L 25.4 29.9*   BUN mg/dL 15 19   CREATININE mg/dL 0.56* 0.68   CALCIUM mg/dL 8.7 8.8   BILIRUBIN mg/dL 0.9 0.6   ALK PHOS U/L 174* 186*   ALT (SGPT) U/L 49* 55*   AST (SGOT) U/L 45* 39*   GLUCOSE mg/dL 126* 124                 Assessment:    Principal Problem:    Pericardial effusion without cardiac tamponade  Active Problems:    Breast cancer metastasized to brain    Breast cancer metastasized to liver    Dyspnea, unspecified    Bilateral pleural effusion    Cancer-related pain    Pleuritic chest pain    Malignant neoplasm metastatic to left lung      Patient Active Problem List   Diagnosis Code   • Breast cancer metastasized to brain C50.919, C79.31   • Breast cancer metastasized to liver C50.919, C78.7   • Encounter for long-term (current) use of high-risk medication Z79.899   • Fitting and adjustment of vascular catheter Z45.2   • Intractable pain R52   • Nausea and vomiting R11.2   • Malignant neoplasm  metastatic to left lung C78.02   • Leukocytopenia D72.819   • Anemia associated with chemotherapy D64.81, T45.1X5A   • Dyspnea, unspecified R06.00   • Hyponatremia syndrome E87.1   • Bilateral pleural effusion J90   • Chemotherapy induced neutropenia D70.1, T45.1X5A   • Malignant neoplasm of other specified sites of female breast C50.919   • Cancer-related pain G89.3   • Pleuritic chest pain R07.81   • Pericardial effusion without cardiac tamponade I31.3       Plan:    Inpatient admission  CXR, basic labs. May need a CT scan to see eval the extension of the effusions.  Cardiology consult  Oncology consult  Thoracic surgery consult  Pain and nausea control  Monitor and correct electrolytes  Monitor mental status  Home medications  DVT/stress ulcer prophylaxis  PT consult  Labs in am        Chester Lynne MD  6/2/2017  11:24 PM      Much of this encounter note is an electronic transcription/translation of spoken language to printed text. The electronic translation of spoken language may permit erroneous, or at times, nonsensical words or phrases to be inadvertently transcribed; Although I have reviewed the note for such errors, some may still exist

## 2017-06-02 NOTE — CONSULTS
Malnutrition Severity Assessment    Patient Name:  Yury Mott  YOB: 1960  MRN: [id number]  Admit Date:  6/2/2017    Patient meets criteria for : Moderate malnutrition    Comments:  Based on current weight status and po intake.      Malnutrition Type: Chronic Illness Malnutrition     Malnutrition Type (last 8 hours)      Malnutrition Severity Assessment       06/02/17 1558    Malnutrition Severity Assessment    Malnutrition Type Chronic Illness Malnutrition          Weight Status         Most Recent Value    %UBW  Mild (86-90%)    Weight Loss  Severe (>5% / 1 mo)      Energy Intake Status         Most Recent Value    Energy Intake  Mod (<75% / > or equal to 1 mo)              Electronically signed by:  Barbara Seth RD  06/02/17 4:04 PM

## 2017-06-03 NOTE — PROGRESS NOTES
"DAILY PROGRESS NOTE  [facility]    Patient Identification:  Name: Yury Mott  Age: 56 y.o.  Sex: female  :  1960  MRN: [id number]         Primary Care Physician: Ted Manley MD    Subjective:very slight improvement in breathing after fluid removed. Awaiting results of tests. Just returned to room.  Interval History: 55 yo wf with breast cancer, recurrent pleural effusions admitted 17 with increased SOA. Removed 350cc pleural fluid today. Possible pericardial fluid. ECHO and CT pending. Cardio and CTS consulted.      Objective:    Scheduled Meds:  fentaNYL 1 patch Transdermal Q72H   Scopolamine 1 patch Transdermal Q72H   sennosides-docusate sodium 2 tablet Oral Daily   sodium chloride 10 mL Intracatheter Q12H     Continuous Infusions:  sodium chloride 75 mL/hr Last Rate: 75 mL/hr (17 1052)       Vital signs in last 24 hours:  Temp:  [98.1 °F (36.7 °C)-98.2 °F (36.8 °C)] 98.1 °F (36.7 °C)  Heart Rate:  [107-127] 115  Resp:  [18-22] 18  BP: (119-124)/(77-85) 120/77    Intake/Output:    Intake/Output Summary (Last 24 hours) at 17 1158  Last data filed at 17 1105   Gross per 24 hour   Intake                0 ml   Output              350 ml   Net             -350 ml       Exam:  /77 (BP Location: Right arm, Patient Position: Lying)  Pulse 115  Temp 98.1 °F (36.7 °C) (Oral)   Resp 18  Ht 67\" (170.2 cm)  Wt 124 lb (56.2 kg)  SpO2 97%  BMI 19.42 kg/m2    General Appearance:    Alert, cooperative, no distress, AAOx3                          Head:    Normocephalic, without obvious abnormality, atraumatic                           Eyes:    PERRL, conjunctiva/corneas clear, EOM's intact, both eyes                         Throat:   Lips, tongue, gums normal; oral mucosa pink and moist                           Neck:   Supple, symmetrical, trachea midline, no JVD                         Lungs:    Clear to auscultation bilaterally, respirations unlabored              "    Chest Wall:    No tenderness or deformity. Port on R side                          Heart:    Regular rate and rhythm, S1 and S2 normal, no murmur,rub or gallop                  Abdomen:     Soft, non-tender, bowel sounds active, no masses, no organomegaly                  Extremities:   Extremities normal, atraumatic, no cyanosis or edema                        Pulses:   Pulses palpable in all extremities                            Skin:   Skin is warm and dry,  no rashes or palpable lesions                  Neurologic:   CNII-XII intact, motor strength grossly intact, no focal deficits noted       Data Review:     Ref. Range 6/3/2017 06:21   Glucose Latest Ref Range: 65 - 99 mg/dL 84   Sodium Latest Ref Range: 136 - 145 mmol/L 137   Potassium Latest Ref Range: 3.5 - 5.2 mmol/L 4.2   CO2 Latest Ref Range: 22.0 - 29.0 mmol/L 29.0   Chloride Latest Ref Range: 98 - 107 mmol/L 96 (L)   Anion Gap Latest Units: mmol/L 12.0   Creatinine Latest Ref Range: 0.57 - 1.00 mg/dL 0.56 (L)   BUN Latest Ref Range: 6 - 20 mg/dL 16   BUN/Creatinine Ratio Latest Ref Range: 7.0 - 25.0  28.6 (H)   Calcium Latest Ref Range: 8.6 - 10.5 mg/dL 8.8   eGFR Non African Amer Latest Ref Range: >60 mL/min/1.73 112   Total Cholesterol Latest Ref Range: 0 - 200 mg/dL 143   HDL Cholesterol Latest Ref Range: 40 - 60 mg/dL 38 (L)   LDL Cholesterol  Latest Ref Range: 0 - 100 mg/dL 86   VLDL Cholesterol Latest Ref Range: 5 - 40 mg/dL 19   Triglycerides Latest Ref Range: 0 - 150 mg/dL 95   LDL/HDL Ratio Unknown 2.26   WBC Latest Ref Range: 4.50 - 10.70 10*3/mm3 7.05   RBC Latest Ref Range: 3.90 - 5.20 10*6/mm3 3.88 (L)   Hemoglobin Latest Ref Range: 11.9 - 15.5 g/dL 11.1 (L)   Hematocrit Latest Ref Range: 35.6 - 45.5 % 34.6 (L)   RDW Latest Ref Range: 11.7 - 13.0 % 17.3 (H)   MCV Latest Ref Range: 80.5 - 98.2 fL 89.2   MCH Latest Ref Range: 26.9 - 32.0 pg 28.6   MCHC Latest Ref Range: 32.4 - 36.3 g/dL 32.1 (L)   MPV Latest Ref Range: 6.0 - 12.0 fL 8.6    Platelets Latest Ref Range: 140 - 500 10*3/mm3 389         Patient Active Problem List   Diagnosis Code   • Breast cancer metastasized to brain C50.919, C79.31   • Breast cancer metastasized to liver C50.919, C78.7   • Encounter for long-term (current) use of high-risk medication Z79.899   • Fitting and adjustment of vascular catheter Z45.2   • Intractable pain R52   • Nausea and vomiting R11.2   • Malignant neoplasm metastatic to left lung C78.02   • Leukocytopenia D72.819   • Anemia associated with chemotherapy D64.81, T45.1X5A   • Dyspnea, unspecified R06.00   • Hyponatremia syndrome E87.1   • Bilateral pleural effusion J90   • Chemotherapy induced neutropenia D70.1, T45.1X5A   • Malignant neoplasm of other specified sites of female breast C50.919   • Cancer-related pain G89.3   • Pleuritic chest pain R07.81   • Pericardial effusion without cardiac tamponade I31.3       Assessment:  Principal Problem:    Pericardial effusion without cardiac tamponade  Active Problems:    Breast cancer metastasized to brain    Breast cancer metastasized to liver    Malignant neoplasm metastatic to left lung    Dyspnea, unspecified    Bilateral pleural effusion    Cancer-related pain    Pleuritic chest pain    Monitor and correct electrolytes    PT consult    Plan:  Await results of testing  Symptom management  Onc consult  Follow all consultant recs  Monitor and correct electrolytes    Dayna Benitez MD  6/3/2017  11:58 AM

## 2017-06-03 NOTE — PROGRESS NOTES
[facility]  Cardiology Progress Note    Patient Identification:  Name:Yury Mott  Age:56 y.o.  Sex: female  :  1960  MRN: [id number]             Length of Stay: 1    Follow up for:  Pericardial effusion      Interval History :  Discussed with Dr. Bansal. Repeat echo showed moderately large pericardial effusion but his main concern is the complete opacificaiton of the left lung which he thinks is most likely due to metastatic disease with atelectasis rather than fluid. At this time her BP is ok. Overall her prognosis is guarded and she doesn't want to be resuscitated. Her tachycardia is multifactorial and pulmonary issue may be contributing more. Not much JVD elevation and not significant pulsus paradoxus.  We decided to hold any pericardial drain today    HPI    The following portions of the patient's history were reviewed and updated as appropriate: allergies, current medications, past family history, past medical history, past social history, past surgical history and problem list.  Past Medical History:   Diagnosis Date   • Anemia     intermittent which responded to iron.    • Breast cancer     Left, ER/TX negative, HER-2 positive    • Liver metastasis     biopsy proven    • Metastasis to brain     irradiated on 7/10/2015 w/ focus radiation       Scheduled Meds:    Current Facility-Administered Medications:   •  bisacodyl (DULCOLAX) EC tablet 10 mg, 10 mg, Oral, Daily PRN, Chester Lynne MD  •  fentaNYL (DURAGESIC) 25 MCG/HR patch 1 patch, 1 patch, Transdermal, Q72H, Chester Lynne MD, 1 patch at 17 0892  •  Glycerin-Hypromellose- (ARTIFICIAL TEARS) 0.2-0.2-1 % ophthalmic solution solution 1 drop, 1 drop, Both Eyes, Q1H PRN, Chester Lynne MD  •  heparin flush (porcine) 100 UNIT/ML injection 500 Units, 5 mL, Intracatheter, PRN, Yokasta Luo, APRN, 500 Units at 17 2784  •  HYDROmorphone (DILAUDID) injection 1 mg, 1 mg, Intravenous, Q2H PRN,  "Chester Lynne MD, 1 mg at 06/03/17 1620  •  ondansetron (ZOFRAN) tablet 8 mg, 8 mg, Oral, Q8H PRN, Chester Lynne MD  •  prochlorperazine (COMPAZINE) tablet 10 mg, 10 mg, Oral, Q6H PRN, Chester Lynne MD  •  promethazine (PHENERGAN) tablet 12.5 mg, 12.5 mg, Oral, Q6H PRN, Chester Lynne MD, 12.5 mg at 06/03/17 0634  •  Scopolamine (TRANSDERM-SCOP) 1.5 MG/3DAYS patch 1 patch, 1 patch, Transdermal, Q72H, Chester Lynne MD, 1 patch at 06/03/17 0853  •  sennosides-docusate sodium (SENOKOT-S) 8.6-50 MG tablet 2 tablet, 2 tablet, Oral, Daily, Chester Lynne MD, 2 tablet at 06/02/17 1832  •  sodium chloride 0.9 % flush 10 mL, 10 mL, Intracatheter, Q12H, Yokasta N Head, APRN, 10 mL at 06/03/17 0854  •  sodium chloride 0.9 % flush 10 mL, 10 mL, Intracatheter, PRN, Yokasta N Head, APRN    All systems were reviewed and negative except for:  Respiratory: positive for  shortness of air    /74 (BP Location: Right arm, Patient Position: Lying)  Pulse 115  Temp 98.1 °F (36.7 °C) (Oral)   Resp 18  Ht 67\" (170.2 cm)  Wt 124 lb (56.2 kg)  SpO2 93%  BMI 19.42 kg/m2       Intake/Output Summary (Last 24 hours) at 06/03/17 1952  Last data filed at 06/03/17 1600   Gross per 24 hour   Intake              450 ml   Output              350 ml   Net              100 ml          Wt Readings from Last 1 Encounters:   06/03/17 0537 124 lb (56.2 kg)   06/02/17 1207 123 lb 4.8 oz (55.9 kg)       Physical Examination: By         Physical Exam    General: Mild respiratory distress and short of breath   Skin: Warm and dry, no diaphoresis noted   HEENT: No ptosis;  oral mucosa moist   Neck: Supple; no carotid bruits; no JVD, Trachea mid line   Heart: S1S2  regular rate and rhythm;  no murmurs; no gallop or rub appreciated, No thrills palpable   Chest: Respirations unlabored at rest, bilateral breath sounds have decreased air entry; left side worse, no  wheezes auscultated.     Abdomen: Soft, non-tender, -distended, " positive bowel sounds  ,No aneurysms palpable   Extremities: Bilateral lower extremities have no pre-tibial pitting edema; Radials are palpable   Neurological: Alert and oriented ; no new motor deficits,       Lab Review:  Personally reviewed the labs, radiology imaging and other cardiac procedures.       Results from last 7 days  Lab Units 06/03/17  0621 06/02/17  1432   SODIUM mmol/L 137 135*   POTASSIUM mmol/L 4.2 4.5   CHLORIDE mmol/L 96* 96*   TOTAL CO2 mmol/L 29.0 25.4   BUN mg/dL 16 15   CREATININE mg/dL 0.56* 0.56*   CALCIUM mg/dL 8.8 8.7   BILIRUBIN mg/dL  --  0.9   ALK PHOS U/L  --  174*   ALT (SGPT) U/L  --  49*   AST (SGOT) U/L  --  45*   GLUCOSE mg/dL 84 126*       Results from last 7 days  Lab Units 06/02/17  1432   TROPONIN T ng/mL <0.010     @[id number]@    Results from last 7 days  Lab Units 06/03/17  0621 06/02/17  1432 05/30/17  0848   WBC 10*3/mm3 7.05 7.52 6.17   HEMOGLOBIN g/dL 11.1* 11.3* 11.0*   HEMATOCRIT % 34.6* 35.0* 35.2   PLATELETS 10*3/mm3 389 354 398*           Estimated Creatinine Clearance: 98.3 mL/min (by C-G formula based on Cr of 0.56).    I personally viewed and interpreted the patient's EKG/Telemetry data        Patient Active Problem List   Diagnosis   • Breast cancer metastasized to brain   • Breast cancer metastasized to liver   • Encounter for long-term (current) use of high-risk medication   • Fitting and adjustment of vascular catheter   • Intractable pain   • Nausea and vomiting   • Malignant neoplasm metastatic to left lung   • Leukocytopenia   • Anemia associated with chemotherapy   • Dyspnea, unspecified   • Hyponatremia syndrome   • Bilateral pleural effusion   • Chemotherapy induced neutropenia   • Malignant neoplasm of other specified sites of female breast   • Cancer-related pain   • Pleuritic chest pain   • Pericardial effusion without cardiac tamponade       Assessment/ Plan :    Moderately large pericardial effusion with pretamponade physiology with probably  bilateral lung tumor metastasis. Patient want to be DNR. Will d/w Thoracic and Oncology MD's in am to decide further plan.    Discussed with  and changed to code status to dnr    I not only counseled the patient today on the significant factors noted in the assessment and plan, but I also recommended that the patient reduce salt and saturated animal fat intake in diet, as well as to perform scheduled exercise on a regular basis.    Mg Perez MD, [facility]  6/3/37962:52 PM    Patient personally interviewed and above subjective findings personally confirmed during a face to face contact with patient today, and I personally performed the  physical examination.  All labs, cardiac procedures,pertinent radiographs of the last 24 hours personally reviewed, Impression and plans discussed/elaborated and implemented by me or jointly as described above -----------Mg Perez MD

## 2017-06-03 NOTE — PROGRESS NOTES
Subjective     CHIEF COMPLAINT:     Metastatic breat cancer.    HISTORY OF PRESENT ILLNESS:    Yury Mott is a 56 y.o. patient with metastatic HER-2 positive breast cancer to brain, liver, bone and chest wall.  She progressed on recent therapy with Tykerb with Xeloda and she was intolerant to Navelbine with Herceptin (chest pain during infusion).  She was seen by Dr. Manley at our office on 5/30/17 and her treatment was switched to Abraxane and Herceptin.    The patient has history of a malignant left pleural effusion causing SOB.  She had PleuRx catheter placed on 5/1/17. She was initially draining up to 300 mL of fluid.     The patient recently started having increasing SOB and chest pain. The output from the PleurX catheter recently decreased to a very small amount of fluid a few days ago.        She was seen by Dr. Perez on 6/2/17 and was found to have a large pericardial effusion.  She was directly admitted to the hospital.        Past Medical History:   Diagnosis Date   • Anemia     intermittent which responded to iron.    • Breast cancer     Left, ER/TN negative, HER-2 positive    • Liver metastasis     biopsy proven    • Metastasis to brain     irradiated on 7/10/2015 w/ focus radiation       Past Surgical History:   Procedure Laterality Date   • MASTECTOMY Left 12/30/2014   • PLEURAL CATHETER INSERTION Left 5/1/2017    Procedure: PLEURX CATHETER INSERTION;  Surgeon: Asael Arriaga MD;  Location: [facility];  Service:    • PORTACATH PLACEMENT  06/2014       SCHEDULED MEDS:    fentaNYL 1 patch Transdermal Q72H   Scopolamine 1 patch Transdermal Q72H   sennosides-docusate sodium 2 tablet Oral Daily   sodium chloride 10 mL Intracatheter Q12H     INFUSIONS:    sodium chloride 75 mL/hr     PRN MEDS:  •  bisacodyl  •  Glycerin-Hypromellose-  •  heparin flush (porcine)  •  HYDROmorphone  •  ondansetron  •  prochlorperazine  •  promethazine  •  sodium chloride    REVIEW OF SYSTEMS:  GENERAL:   weak.   SKIN:  No skin rashes or lesions.  HEME/LYMPH: Mild Anemia.   RESPIRATORY:  Shortness of breath. Cough.   CVS:  Chest pain. Palpitations. No Lower extremity swelling.  GI:  No Abdominal pain. No Nausea. No Vomiting.   MUSCULOSKELETAL:  No Bone pain. No Joint pain. No Joint stiffness.    Objective   VITAL SIGNS:  Temp:  [98.1 °F (36.7 °C)-98.2 °F (36.8 °C)] 98.1 °F (36.7 °C)  Heart Rate:  [107-127] 115  Resp:  [18-22] 18  BP: (119-124)/(77-85) 120/77    Wt Readings from Last 3 Encounters:   06/03/17 124 lb (56.2 kg)   06/02/17 127 lb (57.6 kg)   05/30/17 127 lb 12.8 oz (58 kg)         PHYSICAL EXAMINATION  GENERAL:  The patient is dyspneic and tachypneic.  SKIN:  No skin rashes or lesions. No ecchymosis or petechiae.  HEAD:  Normocephalic.  EYES:  No Jaundice. Pallor.   NECK:  Supple with Good ROM. No Thyromegaly. No Masses.  LYMPHATICS:  No Lymphadenopathy.  CHEST:  Decrease in breath sounds in left lung base.  CARDIAC:  Tachycardic.  ABDOMEN:  Soft. No tenderness. No Hepatomegaly. No Splenomegaly.   EXTREMITIES:  No Edema. No Cyanosis. No Calf tenderness.  NEUROLOGICAL:  No Focal neurological deficits.      RESULT REVIEW:     Results from last 7 days  Lab Units 06/03/17 0621 06/02/17 1432 05/30/17  0848   WBC 10*3/mm3 7.05 7.52 6.17   NEUTROS ABS 10*3/mm3  --  6.87 4.50   HEMOGLOBIN g/dL 11.1* 11.3* 11.0*   HEMATOCRIT % 34.6* 35.0* 35.2   PLATELETS 10*3/mm3 389 354 398*       Results from last 7 days  Lab Units 06/03/17 0621 06/02/17  1432 05/30/17  0848   SODIUM mmol/L 137 135* 136   POTASSIUM mmol/L 4.2 4.5 4.1   CHLORIDE mmol/L 96* 96* 95*   TOTAL CO2 mmol/L 29.0 25.4 29.9*   BUN mg/dL 16 15 19   CREATININE mg/dL 0.56* 0.56* 0.68   CALCIUM mg/dL 8.8 8.7 8.8   ALBUMIN g/dL  --  2.80* 2.80*   BILIRUBIN mg/dL  --  0.9 0.6   ALK PHOS U/L  --  174* 186*   ALT (SGPT) U/L  --  49* 55*   AST (SGOT) U/L  --  45* 39*             Assessment/Plan   1.  Metastatic HER-2 positive breast cancer to brain, liver, bone  and chest wall.  She progressed on recent therapy with Tykerb with Xeloda and she was intolerant to Navelbine with Herceptin.  She was seen by Dr. Manley at our office on 5/30/17 and her treatment was switched to Abraxane and Herceptin.    2.  Pleural effusion.  The output has recently decreased.  Therefore, Activase was instilled into PelurX catheter on 6/2/17. The staff will attempt to drain the tube this morning.       3.  Pericardial effusion. She was seen by Dr. Perez on 6/2/17 and had an ECHO done revealing a large pericardial effusion. She does not have cardiac tamponade.  He is going to discuss with thoracic surgery having a pericardial window.     4.  History of neutropenia due to chemotherapy. The patient received her chemotherapy on 5/30/17 and her WBC and ANC counts remain adequate. We will monitor.         Joel Lopez MD  06/03/17

## 2017-06-03 NOTE — PLAN OF CARE
"Problem: Patient Care Overview (Adult)  Goal: Plan of Care Review  Outcome: Ongoing (interventions implemented as appropriate)    06/03/17 0028   Coping/Psychosocial Response Interventions   Plan Of Care Reviewed With patient;spouse;family   Patient Care Overview   Progress no change   Outcome Evaluation   Outcome Summary/Follow up Plan Pt remains tachycardic with dry cough brought on by movement. States she feels her breathing is a bit better after Pleurx drain gave output of 350mls.Pt does not complain of much pain but notes she is just \"uncomfortable\". All MDs aware of test results, will continue to monitor       Goal: Adult Individualization and Mutuality  Outcome: Ongoing (interventions implemented as appropriate)  Goal: Discharge Needs Assessment  Outcome: Ongoing (interventions implemented as appropriate)    Problem: Acute Coronary Syndrome (ACS) (Adult)  Goal: Signs and Symptoms of Listed Potential Problems Will be Absent or Manageable (Acute Coronary Syndrome)  Outcome: Ongoing (interventions implemented as appropriate)    Problem: Respiratory Insufficiency (Adult)  Goal: Identify Related Risk Factors and Signs and Symptoms  Outcome: Ongoing (interventions implemented as appropriate)  Goal: Acid/Base Balance  Outcome: Ongoing (interventions implemented as appropriate)  Goal: Effective Ventilation  Outcome: Ongoing (interventions implemented as appropriate)    Problem: Fall Risk (Adult)  Goal: Absence of Falls  Outcome: Ongoing (interventions implemented as appropriate)    Problem: Infection, Risk/Actual (Adult)  Goal: Identify Related Risk Factors and Signs and Symptoms  Outcome: Ongoing (interventions implemented as appropriate)  Goal: Infection Prevention/Resolution  Outcome: Ongoing (interventions implemented as appropriate)      "

## 2017-06-03 NOTE — PROGRESS NOTES
Patient is seen and examined and case discussed at length with Dr. Parks.  Patient with advanced metastatic breast cancer.  She was seen by her cardiologist and had an echocardiogram as an outpatient or pericardial effusion was identified without signs of pericardial tamponade.  The patient was admitted.  She is more short of breath and she is at her baseline.  She is having some upper chest discomfort.  About a month ago she had a large left-sided pleural effusion drained and a Pleurx catheter was placed.  She has been symptomatically improved since that time.  Currently the Pleurx catheter is not draining very much.  Chest x-ray obtained yesterday shows slight mediastinal shift towards the side of the opacity on the left indicating that there is not a large pleural effusion causing hemodynamic compromise.  A chest CT scan is ordered for today and is pending.  Similarly, a repeat echocardiogram is been ordered.  The patient has intermittent coughing particularly when changing positions.  She short of breath with exertion.  She has a resting tachycardia with a heart rate between 100 and 125.  Chest radiographs have been personally examined by me and I reviewed the radiology reports.  Laboratories are reviewed renal function is normal.  Hemoglobin is stable.  Platelet count is adequate  Review of systems no head neck chest or abdominal pain.  Other than reported above.  Physical examination afebrile vital signs stable sclerae are anicteric neck is supple trachea is midline no jugular venous distention decreased breath sounds at left base with dullness to percussion of the left base.  Right side is clear.  Abdomen is nondistended nontender.  Extremities are free of cyanosis clubbing or edema.  Mentation is normal neurological examination is nonfocal.    Echocardiogram is reviewed and examined with cardiology.  Report also reviewed.    #1 pericardial effusion with possible early tapenade getting a repeat echo  today  #2 left chest opacity-I think this is more consistent with atelectasis however chest CT scan with IV contrast will be performed today to clarify the issue.  #3 metastatic breast cancer with extensive metastases  Plan we'll follow-up on radiographic studies and discussed with cardiology

## 2017-06-03 NOTE — PLAN OF CARE
Problem: Patient Care Overview (Adult)  Goal: Plan of Care Review  Outcome: Ongoing (interventions implemented as appropriate)    06/03/17 0536   Coping/Psychosocial Response Interventions   Plan Of Care Reviewed With patient   Patient Care Overview   Progress no change   Outcome Evaluation   Outcome Summary/Follow up Plan Patients vitals stable. Patient denies pain during shift. Patient had nonproductive cough. Patient reports takes breakthrough pain medication. Order for PRN pain medication received from Dr Lynne. Will continue to monitor.         Problem: Acute Coronary Syndrome (ACS) (Adult)  Goal: Signs and Symptoms of Listed Potential Problems Will be Absent or Manageable (Acute Coronary Syndrome)  Outcome: Ongoing (interventions implemented as appropriate)    Problem: Respiratory Insufficiency (Adult)  Goal: Identify Related Risk Factors and Signs and Symptoms  Outcome: Ongoing (interventions implemented as appropriate)  Goal: Acid/Base Balance  Outcome: Ongoing (interventions implemented as appropriate)  Goal: Effective Ventilation  Outcome: Ongoing (interventions implemented as appropriate)    Problem: Fall Risk (Adult)  Goal: Absence of Falls  Outcome: Ongoing (interventions implemented as appropriate)

## 2017-06-04 NOTE — PROGRESS NOTES
Addendum to previous note.  #1 widely metastatic breast cancer #2 moderate pericardial effusion without tamponade 9 #3 left lung consolidation possible lymphangitic spread number for enlarging right pleural effusion #5 relatively small left pleural effusion probably loculated.

## 2017-06-04 NOTE — PROGRESS NOTES
[facility]  Cardiology Progress Note    Patient Identification:  Name:Yury Mott  Age:56 y.o.  Sex: female  :  1960  MRN: [id number]             Length of Stay: 2    Follow up for:  Pericardial effusion      Interval History :  Discussed with Dr. Bansal. Repeat echo showed moderately large pericardial effusion but his main concern is the complete opacificaiton of the left lung which he thinks is most likely due to metastatic disease with atelectasis rather than fluid. At this time her BP is ok. Overall her prognosis is guarded and she doesn't want to be resuscitated. Her tachycardia is multifactorial and pulmonary issue may be contributing more. Not much JVD elevation and not significant pulsus paradoxus.  We decided to hold any pericardial window today and proceed with pericardiocentesis tomorrow , had thoracentesis today    HPI    The following portions of the patient's history were reviewed and updated as appropriate: allergies, current medications, past family history, past medical history, past social history, past surgical history and problem list.  Past Medical History:   Diagnosis Date   • Anemia     intermittent which responded to iron.    • Breast cancer     Left, ER/VT negative, HER-2 positive    • Liver metastasis     biopsy proven    • Metastasis to brain     irradiated on 7/10/2015 w/ focus radiation       Scheduled Meds:    Current Facility-Administered Medications:   •  bisacodyl (DULCOLAX) EC tablet 10 mg, 10 mg, Oral, Daily PRN, Chester Lynne MD  •  fentaNYL (DURAGESIC) 25 MCG/HR patch 1 patch, 1 patch, Transdermal, Q72H, Chester Lynne MD, 1 patch at 17 [id number]  •  Glycerin-Hypromellose- (ARTIFICIAL TEARS) 0.2-0.2-1 % ophthalmic solution solution 1 drop, 1 drop, Both Eyes, Q1H PRN, Chester Lynne MD  •  HYDROmorphone (DILAUDID) injection 1 mg, 1 mg, Intravenous, Q2H PRN, Chester Lynne MD, 1 mg at 17 6095  •  lactulose  "(CHRONULAC) 10 GM/15ML solution 30 mL, 30 mL, Oral, Once, Dayna Benitez MD  •  ondansetron (ZOFRAN) tablet 8 mg, 8 mg, Oral, Q8H PRN, Chester Lynne MD  •  prochlorperazine (COMPAZINE) tablet 10 mg, 10 mg, Oral, Q6H PRN, Chester Lynne MD  •  promethazine (PHENERGAN) tablet 12.5 mg, 12.5 mg, Oral, Q6H PRN, Chester Lynne MD, 12.5 mg at 06/03/17 0634  •  Scopolamine (TRANSDERM-SCOP) 1.5 MG/3DAYS patch 1 patch, 1 patch, Transdermal, Q72H, Chester Lynne MD, 1 patch at 06/03/17 0853  •  sennosides-docusate sodium (SENOKOT-S) 8.6-50 MG tablet 2 tablet, 2 tablet, Oral, Daily, Chester Lynne MD, 2 tablet at 06/04/17 0802  •  sodium chloride 0.9 % flush 10 mL, 10 mL, Intracatheter, Q12H, Yokasta N Head, APRN, 10 mL at 06/04/17 0900  •  sodium chloride 0.9 % flush 10 mL, 10 mL, Intracatheter, PRN, Yokasta N Head, APRN    All systems were reviewed and negative except for:  Respiratory: positive for  shortness of air    /89 (BP Location: Right arm, Patient Position: Sitting)  Pulse 120  Temp 98.2 °F (36.8 °C) (Oral)   Resp 20  Ht 67\" (170.2 cm)  Wt 124 lb (56.2 kg)  SpO2 99%  BMI 19.42 kg/m2       Intake/Output Summary (Last 24 hours) at 06/04/17 1953  Last data filed at 06/04/17 1612   Gross per 24 hour   Intake                0 ml   Output              300 ml   Net             -300 ml          Wt Readings from Last 1 Encounters:   06/04/17 0554 124 lb (56.2 kg)   06/03/17 0537 124 lb (56.2 kg)   06/02/17 1207 123 lb 4.8 oz (55.9 kg)       Physical Examination: By         Physical Exam    General: Mild respiratory distress and short of breath   Skin: Warm and dry, no diaphoresis noted   HEENT: No ptosis;  oral mucosa moist   Neck: Supple; no carotid bruits; no JVD, Trachea mid line   Heart: S1S2  regular rate and rhythm;  no murmurs; no gallop or rub appreciated, No thrills palpable   Chest: Respirations unlabored at rest, bilateral breath sounds have decreased air entry; left side worse, no  " wheezes auscultated.     Abdomen: Soft, non-tender, -distended, positive bowel sounds  ,No aneurysms palpable   Extremities: Bilateral lower extremities have no pre-tibial pitting edema; Radials are palpable   Neurological: Alert and oriented ; no new motor deficits,       Lab Review:  Personally reviewed the labs, radiology imaging and other cardiac procedures.       Results from last 7 days  Lab Units 06/04/17  0641  06/02/17  1432   SODIUM mmol/L 132*  < > 135*   POTASSIUM mmol/L 4.4  < > 4.5   CHLORIDE mmol/L 92*  < > 96*   TOTAL CO2 mmol/L 26.8  < > 25.4   BUN mg/dL 20  < > 15   CREATININE mg/dL 0.74  < > 0.56*   CALCIUM mg/dL 8.9  < > 8.7   BILIRUBIN mg/dL  --   --  0.9   ALK PHOS U/L  --   --  174*   ALT (SGPT) U/L  --   --  49*   AST (SGOT) U/L  --   --  45*   GLUCOSE mg/dL 99  < > 126*   < > = values in this interval not displayed.    Results from last 7 days  Lab Units 06/02/17  1432   TROPONIN T ng/mL <0.010     @[id number]@    Results from last 7 days  Lab Units 06/04/17  0641 06/03/17  0621 06/02/17  1432   WBC 10*3/mm3 7.36 7.05 7.52   HEMOGLOBIN g/dL 12.5 11.1* 11.3*   HEMATOCRIT % 39.5 34.6* 35.0*   PLATELETS 10*3/mm3 430 389 354           Estimated Creatinine Clearance: 74.4 mL/min (by C-G formula based on Cr of 0.74).    I personally viewed and interpreted the patient's EKG/Telemetry data        Patient Active Problem List   Diagnosis   • Breast cancer metastasized to brain   • Breast cancer metastasized to liver   • Encounter for long-term (current) use of high-risk medication   • Fitting and adjustment of vascular catheter   • Intractable pain   • Nausea and vomiting   • Malignant neoplasm metastatic to left lung   • Leukocytopenia   • Anemia associated with chemotherapy   • Dyspnea, unspecified   • Hyponatremia syndrome   • Bilateral pleural effusion   • Chemotherapy induced neutropenia   • Malignant neoplasm of other specified sites of female breast   • Cancer-related pain   • Pleuritic chest  pain   • Pericardial effusion without cardiac tamponade       Assessment/ Plan :    Moderately large pericardial effusion with pretamponade physiology with probably bilateral lung tumor metastasis. Patient want to be DNR.     Discussed with  and changed to code status to dnr    Bp is stable today and for pericardiocentesis tomorrow, procedure and complications explained.    I not only counseled the patient today on the significant factors noted in the assessment and plan, but I also recommended that the patient reduce salt and saturated animal fat intake in diet, as well as to perform scheduled exercise on a regular basis.    Mg Perez MD, FACC  6/4/20177:52 PM    Patient personally interviewed and above subjective findings personally confirmed during a face to face contact with patient today, and I personally performed the  physical examination.  All labs, cardiac procedures,pertinent radiographs of the last 24 hours personally reviewed, Impression and plans discussed/elaborated and implemented by me or jointly as described above -----------Mg Perez MD

## 2017-06-04 NOTE — PROGRESS NOTES
Subjective     CHIEF COMPLAINT:     Metastatic breast cancer.    HISTORY OF PRESENT ILLNESS:    The patient continues to feel short of breath and continues to cough.  No hemoptysis.        Past Medical History:   Diagnosis Date   • Anemia     intermittent which responded to iron.    • Breast cancer     Left, ER/VT negative, HER-2 positive    • Liver metastasis     biopsy proven    • Metastasis to brain     irradiated on 7/10/2015 w/ focus radiation       Past Surgical History:   Procedure Laterality Date   • MASTECTOMY Left 12/30/2014   • PLEURAL CATHETER INSERTION Left 5/1/2017    Procedure: PLEURX CATHETER INSERTION;  Surgeon: Asael Arriaga MD;  Location: [facility];  Service:    • PORTACATH PLACEMENT  06/2014       SCHEDULED MEDS:    fentaNYL 1 patch Transdermal Q72H   Scopolamine 1 patch Transdermal Q72H   sennosides-docusate sodium 2 tablet Oral Daily   sodium chloride 10 mL Intracatheter Q12H     INFUSIONS:     PRN MEDS:  •  bisacodyl  •  Glycerin-Hypromellose-  •  heparin flush (porcine)  •  HYDROmorphone  •  ondansetron  •  prochlorperazine  •  promethazine  •  sodium chloride    REVIEW OF SYSTEMS:  GENERAL:  weak.   SKIN:  No skin rashes or lesions.  HEME/LYMPH: Anemia has improved.   RESPIRATORY:  Shortness of breath. Cough.   CVS:  Chest pain. Palpitations. No Lower extremity swelling.  GI:  No Abdominal pain.        Objective   VITAL SIGNS:  Temp:  [98.1 °F (36.7 °C)-98.7 °F (37.1 °C)] 98.2 °F (36.8 °C)  Heart Rate:  [114-116] 115  Resp:  [18-22] 18  BP: (103-113)/(60-74) 103/60    Wt Readings from Last 3 Encounters:   06/04/17 124 lb (56.2 kg)   06/02/17 127 lb (57.6 kg)   05/30/17 127 lb 12.8 oz (58 kg)         PHYSICAL EXAMINATION  GENERAL:  The patient is dyspneic and tachypneic.  SKIN:  No skin rashes or lesions. No ecchymosis or petechiae.  HEAD:  Normocephalic.  EYES:  No Jaundice. Pallor.   CHEST:  Decrease in breath sounds in the mid and lowe lung fields bilaterally.  CARDIAC:   Tachycardic.      RESULT REVIEW:     Results from last 7 days  Lab Units 06/04/17  0641 06/03/17  0621 06/02/17  1432 05/30/17  0848   WBC 10*3/mm3 7.36 7.05 7.52 6.17   NEUTROS ABS 10*3/mm3 5.26  --  6.87 4.50   HEMOGLOBIN g/dL 12.5 11.1* 11.3* 11.0*   HEMATOCRIT % 39.5 34.6* 35.0* 35.2   PLATELETS 10*3/mm3 430 389 354 398*       Results from last 7 days  Lab Units 06/04/17  0641 06/03/17  0621 06/02/17  1432 05/30/17  0848   SODIUM mmol/L 132* 137 135* 136   POTASSIUM mmol/L 4.4 4.2 4.5 4.1   CHLORIDE mmol/L 92* 96* 96* 95*   TOTAL CO2 mmol/L 26.8 29.0 25.4 29.9*   BUN mg/dL 20 16 15 19   CREATININE mg/dL 0.74 0.56* 0.56* 0.68   CALCIUM mg/dL 8.9 8.8 8.7 8.8   ALBUMIN g/dL  --   --  2.80* 2.80*   BILIRUBIN mg/dL  --   --  0.9 0.6   ALK PHOS U/L  --   --  174* 186*   ALT (SGPT) U/L  --   --  49* 55*   AST (SGOT) U/L  --   --  45* 39*          CT scan chest on 6/3/17:  There is a moderately large pericardial effusion that measures  on the order of 2.3 cm in thickness. There is a small bore left-sided  chest tube positioned with the tip at the apex of the left pleural  space. A small to moderate-sized left pleural effusion that is  predominantly subpulmonic in volume remains. There is considerable  consolidation throughout the left lung with only approximately 25% of  the left lung demonstrating aeration. Interstitial opacification and  patchy areas of consolidation are noted throughout this region.      There is a large right pleural effusion. Nodular areas of groundglass  opacification is seen within the right upper lobe, right middle lobe and  right lower lobe. Volume loss at the base of the right lower lobe is  noted.      There is evidence of prior left mastectomy. No evidence for axillary  adenopathy is appreciated. The visualized soft tissue structures of the  upper abdomen have a normal appearance.      IMPRESSION:  1. Moderately large pericardial effusion as described.  2. Moderate-sized left pleural  effusion with considerable consolidation  and volume loss throughout the left lung. The imaging features suggest a  combination of atelectasis, pneumonia and likely metastatic disease  within the left lung.  3. Large right pleural effusion.  4. Groundglass opacities throughout the right upper lobe and right lower  lobe which may represent pulmonary metastases or an atypical pneumonia,  however pulmonary metastases are favored.  I personally reviewed the CT scan.     Assessment/Plan   1.  Metastatic HER-2 positive breast cancer to brain, liver, bone and chest wall.  She progressed on recent therapy with Tykerb with Xeloda and she was intolerant to Navelbine with Herceptin.  Her treatment was switched on 5/30/17 to Abraxane and Herceptin. There is significant consolidation in the left lung concerning for it being due to the tumor as well as lymphangitic spread (only 25% of the lung is aerated).     2.  Bilateral Pleural effusion.  After the use of Activase, 350 mL fluid drained from left. It will be drained again todoay. She will also have right ultrasound guided thoracentesis today.        3.  Pericardial effusion. On 6/2/17,  ECHO revealed a large pericardial effusion without cardiac tamponade. Thoracic surgery recommended percutaneous drainage of the pericardial effusion first. If her symptoms improve, pericardial window will be considered.      4.  History of neutropenia due to chemotherapy. The patient received her chemotherapy on 5/30/17 and her WBC and ANC are normal.     I explained to the patient that if she does not feel better with drainage of the fluid, her symptoms would be attributed to the metastatic disease in the chest. Improvement in her symptoms will therefore depend on her response to the new chemotherapy regimen that was just started.  I will discuss her case with Dr. Manley at our office tomorrow.        Joel Lopez MD  06/04/17

## 2017-06-04 NOTE — NURSING NOTE
Returned from ultrasound. Dressing to mid right back dry and intact. No SOA. C/o discomfort at puncture site 8/10. No distress noted.Awaitng transport.

## 2017-06-04 NOTE — SIGNIFICANT NOTE
06/04/17 0953   Rehab Treatment   Discipline physical therapist   Rehab Evaluation   Evaluation Not Performed other (see comments)  (Attempted skilled PT x 2 today. On first attempt pt's resting , and RN notified and gave pt pain medication. On second attempt pt's resting . Pt and RN made aware of PT holding today due to this issue and are agreeable. )   Recommendation   PT - Next Appointment 06/05/17

## 2017-06-04 NOTE — PROGRESS NOTES
"DAILY PROGRESS NOTE  [facility]    Patient Identification:  Name: Yury Mott  Age: 56 y.o.  Sex: female  :  1960  MRN: [id number]         Primary Care Physician: Ted Manley MD    Subjective:feels ok. Able to ambulate to bathroom   Interval History: 57 yo wf with breast cancer, recurrent pleural effusions admitted 17 with increased SOA. Removed 350cc pleural fluid yesterday.Pericardial fluid per ECHO.CT results reviewed. Cardio and CTS on board.Plan for R sided thoracentesis, needle aspiration of pericardial fluid.     Objective:    Scheduled Meds:  fentaNYL 1 patch Transdermal Q72H   Scopolamine 1 patch Transdermal Q72H   sennosides-docusate sodium 2 tablet Oral Daily   sodium chloride 10 mL Intracatheter Q12H     Continuous Infusions:     Vital signs in last 24 hours:  Temp:  [98.1 °F (36.7 °C)-98.7 °F (37.1 °C)] 98.2 °F (36.8 °C)  Heart Rate:  [114-116] 115  Resp:  [18-22] 18  BP: (103-113)/(60-74) 103/60    Intake/Output:    Intake/Output Summary (Last 24 hours) at 17 1053  Last data filed at 17 1600   Gross per 24 hour   Intake              450 ml   Output              350 ml   Net              100 ml       Exam:  /60 (BP Location: Right arm, Patient Position: Lying)  Pulse 115  Temp 98.2 °F (36.8 °C) (Oral)   Resp 18  Ht 67\" (170.2 cm)  Wt 124 lb (56.2 kg)  SpO2 93%  BMI 19.42 kg/m2    General Appearance:   Alert, cooperative, no distress, AAOx3  Head:   Normocephalic, without obvious abnormality, atraumatic  Eyes:   PERRL, conjunctiva/corneas clear, EOM's intact, both eyes  Throat:  Lips, tongue, gums normal; oral mucosa pink and moist  Neck:  Supple, symmetrical, trachea midline, no JVD  Lungs: Diminished breath sounds bilaterally(L>R), respirations unlabored  Chest Wall:   No tenderness or deformity. Port on R side  Heart:   Regular rate and rhythm, S1 and S2 normal, no murmur,rub or gallop  Abdomen:   Soft, non-tender, bowel sounds active, no " masses, no organomegaly   Extremities:  Extremities normal, atraumatic, no cyanosis or edema  Pulses:  Pulses palpable in all extremities  Skin:  Skin is warm and dry, no rashes or palpable lesions  Neurologic:  CNII-XII intact, motor strength grossly intact, no focal deficits noted     Data Review:     Ref. Range 6/4/2017 06:41   Glucose Latest Ref Range: 65 - 99 mg/dL 99   Sodium Latest Ref Range: 136 - 145 mmol/L 132 (L)   Potassium Latest Ref Range: 3.5 - 5.2 mmol/L 4.4   CO2 Latest Ref Range: 22.0 - 29.0 mmol/L 26.8   Chloride Latest Ref Range: 98 - 107 mmol/L 92 (L)   Anion Gap Latest Units: mmol/L 13.2   Creatinine Latest Ref Range: 0.57 - 1.00 mg/dL 0.74   BUN Latest Ref Range: 6 - 20 mg/dL 20   BUN/Creatinine Ratio Latest Ref Range: 7.0 - 25.0  27.0 (H)   Calcium Latest Ref Range: 8.6 - 10.5 mg/dL 8.9   eGFR Non African Amer Latest Ref Range: >60 mL/min/1.73 81   WBC Latest Ref Range: 4.50 - 10.70 10*3/mm3 7.36   RBC Latest Ref Range: 3.90 - 5.20 10*6/mm3 4.32   Hemoglobin Latest Ref Range: 11.9 - 15.5 g/dL 12.5   Hematocrit Latest Ref Range: 35.6 - 45.5 % 39.5   RDW Latest Ref Range: 11.7 - 13.0 % 17.2 (H)   MCV Latest Ref Range: 80.5 - 98.2 fL 91.4   MCH Latest Ref Range: 26.9 - 32.0 pg 28.9   MCHC Latest Ref Range: 32.4 - 36.3 g/dL 31.6 (L)   MPV Latest Ref Range: 6.0 - 12.0 fL 9.3   Platelets Latest Ref Range: 140 - 500 10*3/mm3 430       Patient Active Problem List   Diagnosis Code   • Breast cancer metastasized to brain C50.919, C79.31   • Breast cancer metastasized to liver C50.919, C78.7   • Encounter for long-term (current) use of high-risk medication Z79.899   • Fitting and adjustment of vascular catheter Z45.2   • Intractable pain R52   • Nausea and vomiting R11.2   • Malignant neoplasm metastatic to left lung C78.02   • Leukocytopenia D72.819   • Anemia associated with chemotherapy D64.81, T45.1X5A   • Dyspnea, unspecified R06.00   • Hyponatremia syndrome E87.1   • Bilateral pleural effusion J90    • Chemotherapy induced neutropenia D70.1, T45.1X5A   • Malignant neoplasm of other specified sites of female breast C50.919   • Cancer-related pain G89.3   • Pleuritic chest pain R07.81   • Pericardial effusion without cardiac tamponade I31.3       Assessment:  Principal Problem:    Pericardial effusion without cardiac tamponade  Active Problems:    Breast cancer metastasized to brain    Breast cancer metastasized to liver    Malignant neoplasm metastatic to left lung    Dyspnea, unspecified    Bilateral pleural effusion    Cancer-related pain    Pleuritic chest pain      Plan:  Follow consultant recs  Symptom management  monitor and correct electrolytes  EOL discussion with pt today. Not ready for hospice care. Not sure yet if she wants to die at home. Trying to decide about when sons should come home. Both in  training right now.        Dayna Benitez MD  6/4/2017  10:53 AM

## 2017-06-04 NOTE — PLAN OF CARE
Problem: Patient Care Overview (Adult)  Goal: Plan of Care Review  Outcome: Ongoing (interventions implemented as appropriate)    06/03/17 1548 06/03/17 2003 06/04/17 0504   Coping/Psychosocial Response Interventions   Plan Of Care Reviewed With --  patient --    Patient Care Overview   Progress no change --  --    Outcome Evaluation   Outcome Summary/Follow up Plan --  --  Patients vitals stable. Patient reports pain once and takes PRN pain medication. Parient denies other complaints reports just tired. Patient slept most of shift. Will continue to monitor.         Problem: Acute Coronary Syndrome (ACS) (Adult)  Goal: Signs and Symptoms of Listed Potential Problems Will be Absent or Manageable (Acute Coronary Syndrome)  Outcome: Ongoing (interventions implemented as appropriate)    Problem: Respiratory Insufficiency (Adult)  Goal: Identify Related Risk Factors and Signs and Symptoms  Outcome: Ongoing (interventions implemented as appropriate)  Goal: Acid/Base Balance  Outcome: Ongoing (interventions implemented as appropriate)  Goal: Effective Ventilation  Outcome: Ongoing (interventions implemented as appropriate)    Problem: Fall Risk (Adult)  Goal: Absence of Falls  Outcome: Ongoing (interventions implemented as appropriate)    Problem: Infection, Risk/Actual (Adult)  Goal: Identify Related Risk Factors and Signs and Symptoms  Outcome: Ongoing (interventions implemented as appropriate)  Goal: Infection Prevention/Resolution  Outcome: Ongoing (interventions implemented as appropriate)

## 2017-06-04 NOTE — PLAN OF CARE
Problem: Patient Care Overview (Adult)  Goal: Plan of Care Review  Outcome: Ongoing (interventions implemented as appropriate)    06/03/17 1548 06/04/17 1329   Coping/Psychosocial Response Interventions   Plan Of Care Reviewed With --  patient;spouse   Patient Care Overview   Progress no change --    Outcome Evaluation   Outcome Summary/Follow up Plan --  Pt's vitals remain stable with exception of tachycardia. Pt had Rt Thoracentisis today and 850ml was drawn off. Pt tolerated well. Will attempt to drain Left Pleurx drain. Will continue to monitor.         06/03/17 1548 06/04/17 1329   Coping/Psychosocial Response Interventions   Plan Of Care Reviewed With --  patient;spouse   Patient Care Overview   Progress no change --    Outcome Evaluation   Outcome Summary/Follow up Plan --  Pt's vitals remain stable with exception of tachycardia. Pt had Rt Thoracentisis today and 850ml was drawn off. Pt tolerated well. Will attempt to drain Left Pleurx drain. Plan to have pericardial effusion drained tomorrow. Will continue to monitor. .       Goal: Adult Individualization and Mutuality  Outcome: Ongoing (interventions implemented as appropriate)  Goal: Discharge Needs Assessment  Outcome: Ongoing (interventions implemented as appropriate)    Problem: Acute Coronary Syndrome (ACS) (Adult)  Goal: Signs and Symptoms of Listed Potential Problems Will be Absent or Manageable (Acute Coronary Syndrome)  Outcome: Ongoing (interventions implemented as appropriate)    Problem: Respiratory Insufficiency (Adult)  Goal: Identify Related Risk Factors and Signs and Symptoms  Outcome: Ongoing (interventions implemented as appropriate)  Goal: Acid/Base Balance  Outcome: Ongoing (interventions implemented as appropriate)  Goal: Effective Ventilation  Outcome: Ongoing (interventions implemented as appropriate)    Problem: Fall Risk (Adult)  Goal: Absence of Falls  Outcome: Ongoing (interventions implemented as appropriate)    Problem:  Infection, Risk/Actual (Adult)  Goal: Identify Related Risk Factors and Signs and Symptoms  Outcome: Ongoing (interventions implemented as appropriate)  Goal: Infection Prevention/Resolution  Outcome: Ongoing (interventions implemented as appropriate)

## 2017-06-04 NOTE — PROGRESS NOTES
Patient seen and examined.  Results of studies gone over with the patient and her .  All questions answered.  Also discussed her case at length with cardiology consultant.  CT scan of the chest examined.  Echocardiographic report reviewed.    Chief complaint shortness of breath    Patient's symptoms are about the same as they were yesterday.  has frequent coughing when changing position.  Minor right upper chest discomfort.  He also has generalized fullness or pressure feeling within the chest.  Cough is nonproductive.  No hemoptysis.    Review of systems no head neck abdominal or extremity pain.    CT scan examined.  Most of the left lung is consolidated.  There is residual fluid within the left chest but this does not appear to be causing the lung consolidation.  The Pleurx catheter is in the middle of the pleural fluid.  There is a large right pleural effusion.  We'll drain ultrasonographically today.  There is a moderate pericardial effusion.  Echocardiogram shows no evidence of tamponade.    The Pleurx catheter draining to 350 cc after thrombolytics were placed.  We'll attempt re-drainage of Pleurx catheter today.    Patient is concerned about her duration of survival.  Her 2 children out of town and will not be available until the end of the month.    I am concerned about surgical intervention in this patient.  She may have lymphangitic spread within her left lung.  General anesthetic may exacerbate this process.    I recommend percutaneous drainage of the right pleural effusion and percutaneous drainage of the pericardial effusion as well as therapeutic and a diagnostic procedure.  If her symptoms are particularly improved by the pericardial drainage then a pericardial window may be performed perhaps even under monitored anesthesia care.  We'll get repeat radiographic imaging tomorrow to assess the pleural catheter drainage and the post thoracentesis drainage.

## 2017-06-05 NOTE — PLAN OF CARE
Problem: Patient Care Overview (Adult)  Goal: Plan of Care Review    06/05/17 2888   Coping/Psychosocial Response Interventions   Plan Of Care Reviewed With patient   Outcome Evaluation   Outcome Summary/Follow up Plan Pt and RN report pt has been ambulating in room independently. Walked in álvarez on 02 w/o safety concerns or assist. Discussed activity recommendations while inpatient and PT POC. DC PT.

## 2017-06-05 NOTE — TREATMENT PLAN
Pt reports she has not had blood return from her port since her last admission. Port flushes easily with no resistance. Pt reports she has had success with the use of cathflo to create blood return. Cath joey ordered and IV will assess port today.  Post cathflo, blood return present.

## 2017-06-05 NOTE — PLAN OF CARE
Problem: Pain, Acute (Adult)  Goal: Identify Related Risk Factors and Signs and Symptoms  Outcome: Ongoing (interventions implemented as appropriate)    06/05/17 1650   Pain, Acute   Related Risk Factors (Acute Pain) fear;patient perception;persistent pain;positioning;terminal illness   Signs and Symptoms (Acute Pain) moaning;pacing/restlessness;nausea/vomiting/anorexia;verbalization of pain descriptors       Goal: Acceptable Pain Control/Comfort Level  Outcome: Ongoing (interventions implemented as appropriate)

## 2017-06-05 NOTE — PLAN OF CARE
Problem: Patient Care Overview (Adult)  Goal: Plan of Care Review  Outcome: Ongoing (interventions implemented as appropriate)  Pt does get out ad ravin. Denies weakness. Pain is only complaint     06/05/17 1418 06/05/17 3785   Coping/Psychosocial Response Interventions   Plan Of Care Reviewed With patient;spouse --    Patient Care Overview   Progress --  no change       Goal: Adult Individualization and Mutuality  Outcome: Ongoing (interventions implemented as appropriate)  Goal: Discharge Needs Assessment  Outcome: Ongoing (interventions implemented as appropriate)  Pt still in significant pain. Pt refusing to have increased pain medication at this time.     Problem: Acute Coronary Syndrome (ACS) (Adult)  Goal: Signs and Symptoms of Listed Potential Problems Will be Absent or Manageable (Acute Coronary Syndrome)  Outcome: Ongoing (interventions implemented as appropriate)    Problem: Respiratory Insufficiency (Adult)  Goal: Identify Related Risk Factors and Signs and Symptoms  Outcome: Ongoing (interventions implemented as appropriate)  Pt has significant pain at site of drainage on right side. CXR today to evaluate. Dr. Arriaga at bedside today and aware.   Goal: Acid/Base Balance  Outcome: Ongoing (interventions implemented as appropriate)  Goal: Effective Ventilation  Outcome: Ongoing (interventions implemented as appropriate)  Anxiety increases her WOB per Pt.     Problem: Fall Risk (Adult)  Goal: Absence of Falls  Outcome: Ongoing (interventions implemented as appropriate)    Problem: Infection, Risk/Actual (Adult)  Goal: Identify Related Risk Factors and Signs and Symptoms  Outcome: Ongoing (interventions implemented as appropriate)  Goal: Infection Prevention/Resolution  Outcome: Ongoing (interventions implemented as appropriate)

## 2017-06-05 NOTE — PROGRESS NOTES
Subjective     CHIEF COMPLAINT:     Metastatic breat cancer.    HISTORY OF PRESENT ILLNESS:    Yury Mott is a 56 y.o. patient with metastatic HER-2 positive breast cancer to brain, liver, bone and chest wall.  She progressed on recent therapy with Tykerb with Xeloda and she was intolerant to Navelbine with Herceptin (chest pain during infusion).  She was seen by Dr. Manley at our office on 5/30/17 and her treatment was switched to Abraxane and Herceptin.    The patient has history of a malignant left pleural effusion causing SOB.  She had PleuRx catheter placed on 5/1/17. She was initially draining up to 300 mL of fluid.     The patient recently started having increasing SOB and chest pain. The output from the PleurX catheter recently decreased to a very small amount of fluid a few days ago.        She was seen by Dr. Perez on 6/2/17 and was found to have a large pericardial effusion.  She was directly admitted to the hospital.        Past Medical History:   Diagnosis Date   • Anemia     intermittent which responded to iron.    • Breast cancer     Left, ER/MA negative, HER-2 positive    • Liver metastasis     biopsy proven    • Metastasis to brain     irradiated on 7/10/2015 w/ focus radiation       Past Surgical History:   Procedure Laterality Date   • MASTECTOMY Left 12/30/2014   • PLEURAL CATHETER INSERTION Left 5/1/2017    Procedure: PLEURX CATHETER INSERTION;  Surgeon: Asael Arriaga MD;  Location: [facility];  Service:    • PORTACATH PLACEMENT  06/2014       SCHEDULED MEDS:    fentaNYL 1 patch Transdermal Q72H   lactulose 30 mL Oral Once   Scopolamine 1 patch Transdermal Q72H   sennosides-docusate sodium 2 tablet Oral Daily   sodium chloride 10 mL Intracatheter Q12H     INFUSIONS:     PRN MEDS:  •  bisacodyl  •  Glycerin-Hypromellose-  •  HYDROmorphone  •  ondansetron  •  prochlorperazine  •  promethazine  •  sodium chloride    REVIEW OF SYSTEMS:  GENERAL:  weak.   SKIN:  No skin  rashes or lesions.  HEME/LYMPH: Mild Anemia.   RESPIRATORY:  Shortness of breath. Cough.   CVS:  Chest pain. Palpitations. No Lower extremity swelling.  GI:  No Abdominal pain. No Nausea. No Vomiting.   MUSCULOSKELETAL:  No Bone pain. No Joint pain. No Joint stiffness.    Objective   VITAL SIGNS:  Temp:  [97.7 °F (36.5 °C)-98 °F (36.7 °C)] 97.7 °F (36.5 °C)  Heart Rate:  [107-115] 110  Resp:  [17-22] 17  BP: ()/(58-89) 99/70    Wt Readings from Last 3 Encounters:   06/05/17 124 lb (56.2 kg)   06/02/17 127 lb (57.6 kg)   05/30/17 127 lb 12.8 oz (58 kg)         PHYSICAL EXAMINATION  GENERAL:  The patient is dyspneic and tachypneic.  SKIN:  No skin rashes or lesions. No ecchymosis or petechiae.  HEAD:  Normocephalic.  EYES:  No Jaundice. Pallor.   NECK:  Supple with Good ROM. No Thyromegaly. No Masses.  LYMPHATICS:  No Lymphadenopathy.  CHEST:  Decrease in breath sounds in left lung base.  CARDIAC:  Tachycardic.  ABDOMEN:  Soft. No tenderness. No Hepatomegaly. No Splenomegaly.   EXTREMITIES:  No Edema. No Cyanosis. No Calf tenderness.  NEUROLOGICAL:  No Focal neurological deficits.      RESULT REVIEW:     Results from last 7 days  Lab Units 06/05/17  0649 06/04/17  0641 06/03/17  0621 06/02/17  1432 05/30/17  0848   WBC 10*3/mm3 5.34 7.36 7.05 7.52 6.17   NEUTROS ABS 10*3/mm3 4.03 5.26  --  6.87 4.50   HEMOGLOBIN g/dL 11.4* 12.5 11.1* 11.3* 11.0*   HEMATOCRIT % 35.9 39.5 34.6* 35.0* 35.2   PLATELETS 10*3/mm3 331 430 389 354 398*       Results from last 7 days  Lab Units 06/05/17  0649 06/04/17  0641 06/03/17  0621 06/02/17  1432 05/30/17  0848   SODIUM mmol/L 134* 132* 137 135* 136   POTASSIUM mmol/L 4.0 4.4 4.2 4.5 4.1   CHLORIDE mmol/L 95* 92* 96* 96* 95*   TOTAL CO2 mmol/L 28.3 26.8 29.0 25.4 29.9*   BUN mg/dL 18 20 16 15 19   CREATININE mg/dL 0.55* 0.74 0.56* 0.56* 0.68   CALCIUM mg/dL 8.8 8.9 8.8 8.7 8.8   ALBUMIN g/dL  --   --   --  2.80* 2.80*   BILIRUBIN mg/dL  --   --   --  0.9 0.6   ALK PHOS U/L  --    --   --  174* 186*   ALT (SGPT) U/L  --   --   --  49* 55*   AST (SGOT) U/L  --   --   --  45* 39*             Assessment/Plan   1.  Metastatic HER-2 positive breast cancer to brain, liver, bone and chest wall.  She progressed on recent therapy with Tykerb with Xeloda and she was intolerant to Navelbine with Herceptin.  She was seen by Dr. Manley at our office on 5/30/17 and her treatment was switched to Abraxane and Herceptin.  Patient is on day 1 and day 8 of Abraxane/Herceptin in on day 1 and Abraxane alone on day 8.  She is due for day 8 chemotherapy tomorrow.  Discussion done with Dr. Manley today and plan is to continue chemotherapy.    2.  Pleural effusion.  The output has recently decreased.  Therefore, Activase was instilled into PelurX catheter on 6/2/17. The staff will attempt to drain the tube this morning.       3.  Pericardial effusion. She was seen by Dr. Perez on 6/2/17 and had an ECHO done revealing a large pericardial effusion. She does not have cardiac tamponade.  Repeat echocardiogram done today did not show evidence of any pericardial effusion.  Dr. Perez does not feel the need to do any pericardiocentesis.  4.  History of neutropenia due to chemotherapy. The patient received her chemotherapy on 5/30/17 and her WBC and ANC counts remain adequate. We will monitor.     5.  Severe right chest pain after thoracocentesis, we will obtain chest x-ray PA and lateral.  We will transfer patient to [facility] towers to control pain and also to give her day 8 Abraxane tomorrow.        Jocelyn Daly MD  06/05/17

## 2017-06-05 NOTE — PROGRESS NOTES
"    Chief Complaint: Shortness of breath      Subjective:  Symptoms:  Stable.  No shortness of breath, cough or chest pain.  (Decreased shortness of breath since draining left PleurX and right thoracentesis.  Still having right pleuritic chest pain otherwise feels better.  Went for pericardial drain but she states the fluid had decreased and procedure was cancelled.  ).    Diet:  Adequate intake.  No nausea or vomiting.    Activity level: Normal.        Vital Signs:  Temp:  [97.7 °F (36.5 °C)-98 °F (36.7 °C)] 97.9 °F (36.6 °C)  Heart Rate:  [107-120] 110  Resp:  [18-22] 18  BP: ()/(58-89) 99/70    Intake & Output (last day)       06/04 0701 - 06/05 0700 06/05 0701 - 06/06 0700    I.V. (mL/kg)      Total Intake(mL/kg)      Other 300     Total Output 300      Net -300            Unmeasured Urine Occurrence 1 x           Objective:  General Appearance:  Comfortable, in no acute distress and ill-appearing.    Vital signs: (most recent): Blood pressure 99/70, pulse 110, temperature 97.9 °F (36.6 °C), temperature source Oral, resp. rate 20, height 67\" (170.2 cm), weight 124 lb (56.2 kg), SpO2 96 %.  No fever.    Lungs:  Normal respiratory rate and normal effort.  There are decreased breath sounds.    Heart: Tachycardia.  Regular rhythm.  No murmur.   Abdomen: Abdomen is soft and non-distended.    Extremities: Normal range of motion.  There is no dependent edema.    Neurological: Patient is alert and oriented to person, place and time.    Skin:  Warm and dry.            Results Review:     I reviewed the patient's new clinical results.  I reviewed the patient's new imaging results and agree with the interpretation.    Imaging Results (last 24 hours)     Procedure Component Value Units Date/Time    US Thoracentesis Right [[id number]] Collected:  06/04/17 1401     Updated:  06/04/17 1404    Narrative:       HISTORY: Right pleural effusion.     PROCEDURE: Ultrasound guided right thoracentesis     Procedure Note: Informed " consent was obtained. Preliminary sonography of  the right hemithorax was performed. A pleural effusion was visualized.  The overlying skin was prepped in the usual sterile fashion. Local  anesthesia was achieved with 1% lidocaine. A small nick was made in the  skin with a scalpel. Through the nick was inserted a needle catheter  device under sonographic guidance. The needle was removed and the  catheter remained and was connected to suction. 850 cc of bloody fluid  was withdrawn. The patient tolerated the procedure without evidence for  complication and left the procedure room in stable condition.       Impression:       1. Technically successful ultrasound guided right thoracentesis.     This report was finalized on 6/4/2017 2:01 PM by Dr. Devon Pastrana MD.       CT Chest With Contrast [[id number]] Collected:  06/03/17 1353     Updated:  06/04/17 1608    Narrative:       EXAMINATION: CT OF THE CHEST WITH CONTRAST     HISTORY: 56-year-old female with history of metastatic breast carcinoma  undergoing possible collapse of the left lung and a pericardial  effusion.     TECHNIQUE: Contiguous axial images were obtained through the chest  following intravenous administration of nonionic contrast.     COMPARISON: Chest x-ray, 06/02/2017.     FINDINGS: There is a moderately large pericardial effusion that measures  on the order of 2.3 cm in thickness. There is a small bore left-sided  chest tube positioned with the tip at the apex of the left pleural  space. A small to moderate-sized left pleural effusion that is  predominantly subpulmonic in volume remains. There is considerable  consolidation throughout the left lung with only approximately 25% of  the left lung demonstrating aeration. Interstitial opacification and  patchy areas of consolidation are noted throughout this region.     There is a large right pleural effusion. Nodular areas of groundglass  opacification is seen within the right upper lobe, right middle  lobe and  right lower lobe. Volume loss at the base of the right lower lobe is  noted.     There is evidence of prior left mastectomy. No evidence for axillary  adenopathy is appreciated. The visualized soft tissue structures of the  upper abdomen have a normal appearance.       Impression:       1. Moderately large pericardial effusion as described.  2. Moderate-sized left pleural effusion with considerable consolidation  and volume loss throughout the left lung. The imaging features suggest a  combination of atelectasis, pneumonia and likely metastatic disease  within the left lung.  3. Large right pleural effusion.  4. Groundglass opacities throughout the right upper lobe and right lower  lobe which may represent pulmonary metastases or an atypical pneumonia,  however pulmonary metastases are favored.     This report was finalized on 6/4/2017 4:05 PM by Dr. Devon Pastrana MD.       XR Chest 1 View [[id number]] Updated:  06/05/17 0608          Lab Results:     Lab Results (last 24 hours)     Procedure Component Value Units Date/Time    Basic Metabolic Panel [[id number]]  (Abnormal) Collected:  06/04/17 0641    Specimen:  Blood Updated:  06/04/17 0817     Glucose 99 mg/dL      BUN 20 mg/dL      Creatinine 0.74 mg/dL      Sodium 132 (L) mmol/L      Potassium 4.4 mmol/L      Chloride 92 (L) mmol/L      CO2 26.8 mmol/L      Calcium 8.9 mg/dL      eGFR Non African Amer 81 mL/min/1.73      BUN/Creatinine Ratio 27.0 (H)     Anion Gap 13.2 mmol/L     Narrative:       GFR Normal >60  Chronic Kidney Disease <60  Kidney Failure <15    CBC & Differential [[id number]] Collected:  06/05/17 0649    Specimen:  Blood Updated:  06/05/17 0706    Narrative:       The following orders were created for panel order CBC & Differential.  Procedure                               Abnormality         Status                     ---------                               -----------         ------                     CBC Auto Differential[[id number]]         Abnormal            Final result                 Please view results for these tests on the individual orders.    CBC Auto Differential [[id number]]  (Abnormal) Collected:  06/05/17 0649    Specimen:  Blood Updated:  06/05/17 0706     WBC 5.34 10*3/mm3      RBC 3.96 10*6/mm3      Hemoglobin 11.4 (L) g/dL      Hematocrit 35.9 %      MCV 90.7 fL      MCH 28.8 pg      MCHC 31.8 (L) g/dL      RDW 16.7 (H) %      RDW-SD 55.4 (H) fl      MPV 8.9 fL      Platelets 331 10*3/mm3      Neutrophil % 75.4 %      Lymphocyte % 18.2 (L) %      Monocyte % 4.9 (L) %      Eosinophil % 0.6 %      Basophil % 0.2 %      Immature Grans % 0.7 (H) %      Neutrophils, Absolute 4.03 10*3/mm3      Lymphocytes, Absolute 0.97 10*3/mm3      Monocytes, Absolute 0.26 10*3/mm3      Eosinophils, Absolute 0.03 10*3/mm3      Basophils, Absolute 0.01 10*3/mm3      Immature Grans, Absolute 0.04 (H) 10*3/mm3     Basic Metabolic Panel [[id number]]  (Abnormal) Collected:  06/05/17 0649    Specimen:  Blood Updated:  06/05/17 0730     Glucose 111 (H) mg/dL      BUN 18 mg/dL      Creatinine 0.55 (L) mg/dL      Sodium 134 (L) mmol/L      Potassium 4.0 mmol/L      Chloride 95 (L) mmol/L      CO2 28.3 mmol/L      Calcium 8.8 mg/dL      eGFR Non African Amer 114 mL/min/1.73      BUN/Creatinine Ratio 32.7 (H)     Anion Gap 10.7 mmol/L     Narrative:       GFR Normal >60  Chronic Kidney Disease <60  Kidney Failure <15           Assessment/Plan     Principal Problem:    Pericardial effusion without cardiac tamponade  Active Problems:    Breast cancer metastasized to brain    Breast cancer metastasized to liver    Malignant neoplasm metastatic to left lung    Dyspnea, unspecified    Bilateral pleural effusion    Cancer-related pain    Pleuritic chest pain       Assessment:    Condition: In stable condition.  Improving.   (Metastatic breast cancer  Malignant pleural effusion).     Plan:   (Drain left PleurX catheter today and resume schedule to drain on Monday,  Wednesday, Friday and prn for SOA.  She knows to contact our office if PleurX stops draining again.  OK for family member to drain PleurX catheter if needed.  ).       Yokasta Luo, APRN  [facility]  06/05/17  8:08 AM

## 2017-06-05 NOTE — PROGRESS NOTES
"DAILY PROGRESS NOTE  [facility], [facility]    2017    Patient Identification:  Name: Yury Mott  Age: 56 y.o.  Sex: female  :  1960  MRN: [id number]           Primary Care Physician: Ted Manley MD    Subjective:    Interval History:    Felt much better after thoracentesis done yesterday.  Today, echo showed no much pericardial effusion, so no pericardiocentesis done.   PleuRx to be drained this pm        ROS:     No nausea, vomiting, diarrhea or constipation    Objective:    Scheduled Meds:    fentaNYL 1 patch Transdermal Q72H   Scopolamine 1 patch Transdermal Q72H   sennosides-docusate sodium 2 tablet Oral Daily   sodium chloride 10 mL Intracatheter Q12H       Continuous Infusions:       PRN Meds:  •  bisacodyl  •  Glycerin-Hypromellose-  •  HYDROmorphone  •  ondansetron  •  prochlorperazine  •  promethazine  •  promethazine  •  sodium chloride    Intake/Output:    Intake/Output Summary (Last 24 hours) at 17 1545  Last data filed at 17 1300   Gross per 24 hour   Intake              480 ml   Output              300 ml   Net              180 ml         Exam:    tMax 24 hrs: Temp (24hrs), Av.8 °F (36.6 °C), Min:97.7 °F (36.5 °C), Max:98 °F (36.7 °C)    Vitals Ranges:   Temp:  [97.7 °F (36.5 °C)-98 °F (36.7 °C)] 97.7 °F (36.5 °C)  Heart Rate:  [107-115] 110  Resp:  [17-22] 17  BP: ()/(58-89) 99/70    BP 99/70 (BP Location: Right arm, Patient Position: Sitting)  Pulse 110  Temp 97.7 °F (36.5 °C) (Oral)   Resp 17  Ht 67\" (170.2 cm)  Wt 124 lb (56.2 kg)  SpO2 97%  BMI 19.42 kg/m2       General Appearance: Alert, cooperative, no distress, AAOx3  Neck: Supple, symmetrical, trachea midline, no JVD  Lungs: Diminished breath sounds bilaterally(L>R), respirations unlabored  Chest Wall: No tenderness or deformity. Port on R side  Heart: Regular rate and rhythm, S1 and S2 normal, no murmur,rub or gallop  Abdomen: Soft, non-tender, " bowel sounds active, no masses, no organomegaly   Extremities: Extremities normal, atraumatic, no cyanosis or edema  Pulses: Pulses palpable in all extremities  Skin: Skin is warm and dry, no rashes or palpable lesions  Neurologic: CNII-XII intact, motor strength grossly intact, no focal deficits noted          Data Review:      Results from last 7 days  Lab Units 06/05/17  0649 06/04/17  0641 06/03/17  0621   WBC 10*3/mm3 5.34 7.36 7.05   HEMOGLOBIN g/dL 11.4* 12.5 11.1*   HEMATOCRIT % 35.9 39.5 34.6*   PLATELETS 10*3/mm3 331 430 389         Results from last 7 days  Lab Units 06/05/17  0649 06/04/17  0641 06/03/17  0621 06/02/17  1432 05/30/17  0848   SODIUM mmol/L 134* 132* 137 135* 136   POTASSIUM mmol/L 4.0 4.4 4.2 4.5 4.1   CHLORIDE mmol/L 95* 92* 96* 96* 95*   TOTAL CO2 mmol/L 28.3 26.8 29.0 25.4 29.9*   BUN mg/dL 18 20 16 15 19   CREATININE mg/dL 0.55* 0.74 0.56* 0.56* 0.68   CALCIUM mg/dL 8.8 8.9 8.8 8.7 8.8   BILIRUBIN mg/dL  --   --   --  0.9 0.6   ALK PHOS U/L  --   --   --  174* 186*   ALT (SGPT) U/L  --   --   --  49* 55*   AST (SGOT) U/L  --   --   --  45* 39*   GLUCOSE mg/dL 111* 99 84 126* 124             0  Lab Value Date/Time   TROPONINT <0.010 06/02/2017 1432       Microbiology Results (last 10 days)     ** No results found for the last 240 hours. **           Imaging Results (last 7 days)     Procedure Component Value Units Date/Time    XR Chest PA & Lateral [[id number]] Collected:  06/02/17 1613     Updated:  06/02/17 1622    Narrative:       PA AND LATERAL CHEST     CLINICAL HISTORY: Metastatic breast carcinoma. Malignant effusion.  Dyspnea.     Compared to the previous portable chest x-ray dated 05/01/2017.     There is a large left pleural effusion producing near complete  compressive atelectasis of the left lung that shows slight interval  increase in size, despite the presence of a Pleurx chest tube. A small  right pleural effusion has also developed in the interval. The right  lung appears  slightly better expanded and remains free of focal  infiltrates. There is atelectasis in the medial aspect of the right lung  base. A Mediport device is in place in satisfactory position in the  right subclavian vein without change.     This report was finalized on 6/2/2017 4:19 PM by Dr. Tyson Thompson MD.       US Thoracentesis Right [[id number]] Collected:  06/04/17 1401     Updated:  06/04/17 1404    Narrative:       HISTORY: Right pleural effusion.     PROCEDURE: Ultrasound guided right thoracentesis     Procedure Note: Informed consent was obtained. Preliminary sonography of  the right hemithorax was performed. A pleural effusion was visualized.  The overlying skin was prepped in the usual sterile fashion. Local  anesthesia was achieved with 1% lidocaine. A small nick was made in the  skin with a scalpel. Through the nick was inserted a needle catheter  device under sonographic guidance. The needle was removed and the  catheter remained and was connected to suction. 850 cc of bloody fluid  was withdrawn. The patient tolerated the procedure without evidence for  complication and left the procedure room in stable condition.       Impression:       1. Technically successful ultrasound guided right thoracentesis.     This report was finalized on 6/4/2017 2:01 PM by Dr. Devon Pastrana MD.       CT Chest With Contrast [[id number]] Collected:  06/03/17 1353     Updated:  06/04/17 1608    Narrative:       EXAMINATION: CT OF THE CHEST WITH CONTRAST     HISTORY: 56-year-old female with history of metastatic breast carcinoma  undergoing possible collapse of the left lung and a pericardial  effusion.     TECHNIQUE: Contiguous axial images were obtained through the chest  following intravenous administration of nonionic contrast.     COMPARISON: Chest x-ray, 06/02/2017.     FINDINGS: There is a moderately large pericardial effusion that measures  on the order of 2.3 cm in thickness. There is a small bore left-sided  chest  tube positioned with the tip at the apex of the left pleural  space. A small to moderate-sized left pleural effusion that is  predominantly subpulmonic in volume remains. There is considerable  consolidation throughout the left lung with only approximately 25% of  the left lung demonstrating aeration. Interstitial opacification and  patchy areas of consolidation are noted throughout this region.     There is a large right pleural effusion. Nodular areas of groundglass  opacification is seen within the right upper lobe, right middle lobe and  right lower lobe. Volume loss at the base of the right lower lobe is  noted.     There is evidence of prior left mastectomy. No evidence for axillary  adenopathy is appreciated. The visualized soft tissue structures of the  upper abdomen have a normal appearance.       Impression:       1. Moderately large pericardial effusion as described.  2. Moderate-sized left pleural effusion with considerable consolidation  and volume loss throughout the left lung. The imaging features suggest a  combination of atelectasis, pneumonia and likely metastatic disease  within the left lung.  3. Large right pleural effusion.  4. Groundglass opacities throughout the right upper lobe and right lower  lobe which may represent pulmonary metastases or an atypical pneumonia,  however pulmonary metastases are favored.     This report was finalized on 6/4/2017 4:05 PM by Dr. Devon Pastrana MD.       XR Chest 1 View [[id number]] Collected:  06/05/17 0853     Updated:  06/05/17 1241    Narrative:       HISTORY: 56-year-old female with metastatic breast cancer, pleural  effusions, shortness of breath, left PleurX catheter, now 1 day status  post right thoracentesis.     PORTABLE AP ERECT CHEST DATED 06/05/2017 AT 0523 HOURS.     FINDINGS: When compared to PA and lateral chest radiographs of  06/02/2017 and CT chest exam of 06/03/2017, there is again subtotal  opacification of the left chest with some  modest patchy aeration of left  upper lung parenchyma present. The left PleurX catheter is again  demonstrated. The right subclavian port catheter remains and terminates  in the distribution of superior vena cava. The cardiac silhouette is  largely obscured but appears likely top normal to mildly enlarged  caliber, compatible with demonstration of pericardial effusion on CT  exam of 06/03/2017. Some trace residual right pleural fluid versus  pleural thickening at the lateral right pleural stripe and right  costophrenic angle remain. Monitoring lead wires are present. Oxygen  cannula tubing is present about the neck and right shoulder. No large  pneumothorax is seen. There may be a 1 mm trace superolateral and apical  right pneumothorax. A tiny air bubble at the left apex projecting  adjacent to the PleurX catheter is noted.     CONCLUSION: Decreased right pleural fluid with minimal if any right  apical pneumothorax. Tiny trace of air lucency at the left apex with  superimposed PleurX catheter noted. There is again subtotal  opacification of the left chest and apparent enlargement of cardiac  silhouette as discussed above.     This report was finalized on 6/5/2017 12:38 PM by Dr. Moe Coon MD.               Assessment:      Principal Problem:    Pericardial effusion without cardiac tamponade  Active Problems:    Breast cancer metastasized to brain    Breast cancer metastasized to liver    Dyspnea, unspecified    Bilateral pleural effusion    Cancer-related pain    Pleuritic chest pain    Malignant neoplasm metastatic to left lung      Patient Active Problem List   Diagnosis Code   • Breast cancer metastasized to brain C50.919, C79.31   • Breast cancer metastasized to liver C50.919, C78.7   • Encounter for long-term (current) use of high-risk medication Z79.899   • Fitting and adjustment of vascular catheter Z45.2   • Intractable pain R52   • Nausea and vomiting R11.2   • Malignant neoplasm metastatic to left lung  C78.02   • Leukocytopenia D72.819   • Anemia associated with chemotherapy D64.81, T45.1X5A   • Dyspnea, unspecified R06.00   • Hyponatremia syndrome E87.1   • Bilateral pleural effusion J90   • Chemotherapy induced neutropenia D70.1, T45.1X5A   • Malignant neoplasm of other specified sites of female breast C50.919   • Cancer-related pain G89.3   • Pleuritic chest pain R07.81   • Pericardial effusion without cardiac tamponade I31.3       Plan:    Feeling much better, agree to transfer to [facility] for chemotherapy  Monitor and correct electrolytes  Continue home medications  DVT/stress ulcer prophylaxis  Labs in       Chester Lynne MD  6/5/2017  3:45 PM        Much of this encounter note is an electronic transcription/translation of spoken language to printed text. The electronic translation of spoken language may permit erroneous, or at times, nonsensical words or phrases to be inadvertently transcribed; Although I have reviewed the note for such errors, some may still exist

## 2017-06-05 NOTE — PAYOR COMM NOTE
"Yury Cardona RICH (56 y.o. Female)     PLEASE SEE ATTACHED CLINICAL REVIEW ON PATIENT. PT. WAS ADMITTED TO [facility] ON 6/2/17 TO A MONITORED TELEM FLOOR.    REF#[id number]    PLEASE CALL   OR  [phone] WITH INPT AUTH AND DAYS APPROVED. [facility] IS DRG WITH LOCAL [facility].    THANK YOU    JOSEPH MOONEY, LPN, CCP      Date of Birth Social Security Number Address Home Phone MRN    1960  [facility] [facility] [place] [address] [phone] [id number]    Yarsanism Marital Status          Judaism        Admission Date Admission Type Admitting Provider Attending Provider Department, Room/Bed    6/2/17 Urgent Chester Lynne MD [place], Marlon R, MD [address], 509/1    Discharge Date Discharge Disposition Discharge Destination                      Attending Provider: Chester Lynne MD     Allergies:  Latex    Isolation:  None   Infection:  None   Code Status:  Conditional    Ht:  67\" (170.2 cm)   Wt:  124 lb (56.2 kg)    Admission Cmt:  medicare, anthem   Principal Problem:  Pericardial effusion without cardiac tamponade [I31.3]                 Active Insurance as of 6/2/2017     Primary Coverage     Payor Plan Insurance Group Employer/Plan Group    [employer] EMPLOYEE [id number]     Payor Plan Address Payor Plan Phone Number Effective From Effective To    [address] [phone] 1/1/2015     [place] [address]       Subscriber Name Subscriber Birth Date Member ID       JEFF CARDONA 11/27/1954 [id number]           Secondary Coverage     Payor Plan Insurance Group Employer/Plan Group    MEDICARE MEDICARE A & B      Payor Plan Address Payor Plan Phone Number Effective From Effective To    [address] [phone] 4/26/2017     [place] [address]       Subscriber Name Subscriber Birth Date Member ID       YURY CARDONA 1960 [id number]                 Emergency Contacts      (Rel.) Home Phone Work Phone " Mobile Phone    Evaristo Mott (Spouse) [phone] -- [phone]               History & Physical      Chester Lynne MD at 2017  1:47 PM          HISTORY AND PHYSICAL   [facility], [facility]      2017    Patient Identification:    Name: Yury Mott  Age: 56 y.o.  Sex: female  :  1960  MRN: [id number]                       Primary Care Physician: Ted Manley MD    Chief Complaint:      Chest pain and SOB      History of Present Illness:     Patient is a 55 y/o female, with h/o metastatic breast cancer, with metastasis to brain, lungs, pleura, liver, apparently progressing despite multiple previous chemotherapy regimens, but now with some response to current regimen. . She does have a malignant left pleural effusion that has caused SOB before and a PleuRx was placed at the end of 2017. She was found to have pericardial effusion and was then seen by Dr. Perez, she was complaining of chest pain, pressure-like, that comes and goes, pain gets worse with deep inspiration. No radiation of the pain, no other symptoms associated with it (Nausea, vomiting, diaphoresis, dizziness), she does have SOB and severe fatigue and weight loss. Due to the severity of the symptoms, she was admitted for further management.    Past Medical History:  Past Medical History:   Diagnosis Date   • Anemia     intermittent which responded to iron.    • Breast cancer     Left, ER/MA negative, HER-2 positive    • Liver metastasis     biopsy proven    • Metastasis to brain     irradiated on 7/10/2015 w/ focus radiation     Past Surgical History:  Past Surgical History:   Procedure Laterality Date   • MASTECTOMY Left 2014   • PLEURAL CATHETER INSERTION Left 2017    Procedure: PLEURX CATHETER INSERTION;  Surgeon: Asael Arriaga MD;  Location: [facility];  Service:    • PORTACATH PLACEMENT  2014      Home Meds:  Prescriptions Prior to Admission   Medication Sig Dispense Refill Last  Dose   • bisacodyl (DULCOLAX) 5 MG EC tablet Take 10 mg by mouth Daily As Needed for Constipation (PT STS TAKES 2-3 PPRN).      • Glycerin-Hypromellose- (ARTIFICIAL TEARS) 0.2-0.2-1 % solution ophthalmic solution Administer 1 drop to both eyes Every 1 (One) Hour As Needed for Dry Eyes. 1 bottle 2 Past Week at Unknown time   • lactulose (CHRONULAC) 10 GM/15ML solution Take 30 mL by mouth Daily. (Patient taking differently: Take 20 g by mouth Daily As Needed.) 300 mL 1 Past Week at Unknown time   • ondansetron (ZOFRAN) 8 MG tablet Take 1 tablet by mouth Every 8 (Eight) Hours As Needed for nausea or vomiting. 30 tablet 2 Past Week at Unknown time   • prochlorperazine (COMPAZINE) 10 MG tablet Take 10 mg by mouth Every 6 (Six) Hours As Needed for Nausea or Vomiting.   Past Week at Unknown time   • promethazine (PHENERGAN) 12.5 MG tablet Take 1 tablet by mouth Every 6 (Six) Hours As Needed for Nausea or Vomiting. 40 tablet 1 Past Week at Unknown time   • Scopolamine (TRANSDERM-SCOP, 1.5 MG,) 1.5 MG/3DAYS patch Place 1 patch on the skin Every 72 (Seventy-Two) Hours. (Patient taking differently: Place 1 patch on the skin Every 72 (Seventy-Two) Hours. DUE TO BE CHANGED 06/03/2017) 24 patch 3 5/31/2017 at Unknown time   • sennosides-docusate sodium (SENOKOT-S) 8.6-50 MG tablet Take 2 tablets by mouth Daily. 60 tablet 5 Past Month at Unknown time   • fentaNYL (DURAGESIC) 25 MCG/HR patch Place 1 patch on the skin Every 72 (Seventy-Two) Hours. (Patient taking differently: Place 1 patch on the skin Every 72 (Seventy-Two) Hours. DUE TO BE CHANGED 06/03/2017) 10 patch 0 5/31/2017       Allergies:  Allergies   Allergen Reactions   • Latex Rash     Immunizations:    There is no immunization history on file for this patient.  Social History:   Social History     Social History Narrative    She is  and lives with her . She works as a  for [employer]      Social History   Substance Use Topics   • Smoking  "status: Never Smoker   • Smokeless tobacco: Former User   • Alcohol use No     Family History:  Family History   Problem Relation Age of Onset   • Uterine cancer Maternal Grandmother      mid 50's   • Cancer Other    • Other Other      Cardiac disorder   • Cancer Mother 70     Face   • Anemia Mother    • Heart disease Father         Review of Systems  See history of present illness and past medical history.    Patient denies headache, dizziness, syncope, falls, trauma, change in vision, change in hearing, change in taste, focal weakness, numbness, or paresthesia.    Patient denies chest pain, palpitations, dyspnea, orthopnea, PND, cough, sinus pressure, rhinorrhea, epistaxis, hemoptysis, nausea, vomiting,hematemesis, diarrhea, constipation or hematchezia.    Denies cold or heat intolerance, polydipsia, polyuria, polyphagia.   Denies hematuria, pyuria, dysuria, hesitancy, frequency or urgency.   Denies consumption of raw and under cooked meats foods or change in water source.  Denies fever, chills, sweats, night sweats.    Denies missing any routine medications.   Remainder of ROS is negative.    Objective:    Exam:    tMax 24 hrs: Temp (24hrs), Av.2 °F (36.8 °C), Min:98.1 °F (36.7 °C), Max:98.2 °F (36.8 °C)    Vitals Ranges:   Temp:  [98.1 °F (36.7 °C)-98.2 °F (36.8 °C)] 98.2 °F (36.8 °C)  Heart Rate:  [108-127] 108  Resp:  [20-22] 22  BP: ()/(64-85) 124/85    /85 (BP Location: Right arm, Patient Position: Lying)  Pulse 108  Temp 98.2 °F (36.8 °C) (Oral)   Resp 22  Ht 67\" (170.2 cm)  Wt 123 lb 4.8 oz (55.9 kg)  SpO2 97%  BMI 19.31 kg/m2    General: Alert, oriented x 3 . Cooperative, no distress while on O2, appears older than stated age  HEENT:    Head: Normocephalic, without obvious abnormality, atraumatic  Eyes: EOM are normal. Pupils are equal, round, and reactive to light.   Oropharynx: Mucosa and tongue normal  Neck: Supple, symmetrical, trachea midline, no adenopathy;              " thyroid:  no enlargement/tenderness/nodules;              no carotid bruit or JVD  Cardiovascular: Tachycardic, Tachypnea, regular rhythm and intact distal pulses.              Exam reveals no gallop and no friction rub. No murmur or gallops.  Chest wall: TTP in left side  Pulmonary: Diminished BS in left side, rhonchi left side. Diminished BS right base. No crackles or wheezing.  Abdominal: Soft. non-tender, bowel sounds active all four quadrants,     no masses, no hepatomegaly, no splenomegaly.   Extremities: Normal, atraumatic, no cyanosis or edema  Pulses: 2 + symmetric all extremities  Neurological: He is alert and oriented to person, place, and time.                 CNII-XII intact, normal strength, sensation intact throughout  Skin: Skin color, texture, turgor normal, no rashes or lesions          Assessment:    Principal Problem:    Pericardial effusion without cardiac tamponade  Active Problems:    Breast cancer metastasized to brain    Breast cancer metastasized to liver    Dyspnea, unspecified    Bilateral pleural effusion    Cancer-related pain    Pleuritic chest pain    Malignant neoplasm metastatic to left lung      Patient Active Problem List   Diagnosis Code   • Breast cancer metastasized to brain C50.919, C79.31   • Breast cancer metastasized to liver C50.919, C78.7   • Encounter for long-term (current) use of high-risk medication Z79.899   • Fitting and adjustment of vascular catheter Z45.2   • Intractable pain R52   • Nausea and vomiting R11.2   • Malignant neoplasm metastatic to left lung C78.02   • Leukocytopenia D72.819   • Anemia associated with chemotherapy D64.81, T45.1X5A   • Dyspnea, unspecified R06.00   • Hyponatremia syndrome E87.1   • Bilateral pleural effusion J90   • Chemotherapy induced neutropenia D70.1, T45.1X5A   • Malignant neoplasm of other specified sites of female breast C50.919   • Cancer-related pain G89.3   • Pleuritic chest pain R07.81   • Pericardial effusion without  cardiac tamponade I31.3       Plan:    Inpatient admission  CXR, basic labs. May need a CT scan to see eval the extension of the effusions.  Cardiology consult  Oncology consult  Thoracic surgery consult  Pain and nausea control  Monitor and correct electrolytes  Monitor mental status  Home medications  DVT/stress ulcer prophylaxis  PT consult  Labs in am        Chester Lynne MD  6/2/2017  11:24 PM      Much of this encounter note is an electronic transcription/translation of spoken language to printed text. The electronic translation of spoken language may permit erroneous, or at times, nonsensical words or phrases to be inadvertently transcribed; Although I have reviewed the note for such errors, some may still exist     Electronically signed by Chester Lynne MD at 6/2/2017 11:52 PM        Emergency Department Notes     No notes of this type exist for this encounter.        Vital Signs (last 72 hrs)       06/02 0700  -  06/03 0659 06/03 0700  -  06/04 0659 06/04 0700  -  06/05 0659 06/05 0700  -  06/05 1346   Most Recent    Temp (°F) 98.1 -  98.2    98.1 -  98.7    97.7 -  98.2    97.7 -  97.9     97.7 (36.5)    Heart Rate 107 -  (!)127    114 -  116    107 -  120      110     110    Resp 18 -  22    18 -  22    18 -  22    17 -  20     17    BP 92/64 -  124/85    105/62 -  113/74    95/58 -  133/89      99/70     99/70    SpO2 (%) 96 -  97      93    90 -  99    96 -  97     97          Intake & Output (last 3 days)       06/02 0701 - 06/03 0700 06/03 0701 - 06/04 0700 06/04 0701 - 06/05 0700 06/05 0701 - 06/06 0700    P.O.    480    I.V. (mL/kg)  450 (8)      Total Intake(mL/kg)  450 (8)  480 (8.5)    Other  350 300     Total Output   350 300      Net   +100 -300 +480            Unmeasured Urine Occurrence 1 x  1 x         Lines, Drains & Airways    Active LDAs     Name:   Placement date:   Placement time:   Site:   Days:    Implanted Port - Single Lumen Port 07/02/14 1200 infraclavicular fossa, right  pressure injectable catheter  07/02/14    1200      1069    Implanted Port - Single Lumen Port 06/02/17 1703 infraclavicular fossa, right pressure injectable catheter  06/02/17    1703      2    Drain/Device Site 05/01/17 1442 Left lateral chest collapsible closed device 1  LEFT PLEURX CATH  05/01/17    1442      34         Inactive LDAs     None                Hospital Medications (all)       Dose Frequency Start End    alteplase ((CATHFLO/ACTIVASE)) injection 2 mg 2 mg Once 6/2/2017 6/2/2017    Sig - Route: 2 mL by Intracatheter route 1 (One) Time. - Intracatheter    alteplase ((CATHFLO/ACTIVASE)) syringe 2 mg 2 mg Once 6/5/2017 6/5/2017    Sig - Route: 2 mL by Intracatheter route 1 (One) Time. - Intracatheter    Notes to Pharmacy: For IV team use in implanted port.    bisacodyl (DULCOLAX) EC tablet 10 mg 10 mg Daily PRN 6/2/2017     Sig - Route: Take 2 tablets by mouth Daily As Needed for Constipation (PT STS TAKES 2-3 PPRN). - Oral    fentaNYL (DURAGESIC) 25 MCG/HR patch 1 patch 1 patch Every 72 Hours 6/2/2017     Sig - Route: Place 1 patch on the skin Every 72 (Seventy-Two) Hours. - Transdermal    Glycerin-Hypromellose- (ARTIFICIAL TEARS) 0.2-0.2-1 % ophthalmic solution solution 1 drop 1 drop Every 1 Hour PRN 6/2/2017     Sig - Route: Administer 1 drop to both eyes Every 1 (One) Hour As Needed for Dry Eyes. - Both Eyes    HYDROmorphone (DILAUDID) injection 1 mg 1 mg Every 2 Hours PRN 6/2/2017 6/12/2017    Sig - Route: Infuse 1 mg into a venous catheter Every 2 (Two) Hours As Needed for Severe Pain (7-10). - Intravenous    iopamidol (ISOVUE-300) 61 % injection 100 mL 100 mL Once in Imaging 6/3/2017 6/3/2017    Sig - Route: Infuse 100 mL into a venous catheter Once. - Intravenous    lactulose (CHRONULAC) 10 GM/15ML solution 30 mL 30 mL Once 6/4/2017 6/4/2017    Sig - Route: Take 30 mL by mouth 1 (One) Time. - Oral    lactulose (CHRONULAC) 10 GM/15ML solution 30 mL 30 mL Once 6/5/2017     Sig - Route: Take  30 mL by mouth 1 (One) Time. - Oral    lidocaine (XYLOCAINE) 1 % injection 20 mL 20 mL Once 6/4/2017 6/4/2017    Sig - Route: Inject 20 mL into the skin 1 (One) Time. - Intradermal    Cosign for Ordering: Accepted by Hemant Perez MD on 6/5/2017  9:01 AM    ondansetron (ZOFRAN) tablet 8 mg 8 mg Every 8 Hours PRN 6/2/2017     Sig - Route: Take 1 tablet by mouth Every 8 (Eight) Hours As Needed for Nausea or Vomiting. - Oral    prochlorperazine (COMPAZINE) tablet 10 mg 10 mg Every 6 Hours PRN 6/2/2017     Sig - Route: Take 1 tablet by mouth Every 6 (Six) Hours As Needed for Nausea or Vomiting. - Oral    promethazine (PHENERGAN) tablet 12.5 mg 12.5 mg Every 6 Hours PRN 6/2/2017     Sig - Route: Take 1 tablet by mouth Every 6 (Six) Hours As Needed for Nausea or Vomiting. - Oral    Scopolamine (TRANSDERM-SCOP) 1.5 MG/3DAYS patch 1 patch 1 patch Every 72 Hours 6/2/2017     Sig - Route: Place 1 patch on the skin Every 72 (Seventy-Two) Hours. - Transdermal    sennosides-docusate sodium (SENOKOT-S) 8.6-50 MG tablet 2 tablet 2 tablet Daily 6/2/2017     Sig - Route: Take 2 tablets by mouth Daily. - Oral    sodium chloride 0.9 % flush 10 mL 10 mL Every 12 Hours Scheduled 6/2/2017     Sig - Route: 10 mL by Intracatheter route Every 12 (Twelve) Hours. - Intracatheter    sodium chloride 0.9 % flush 10 mL 10 mL As Needed 6/2/2017     Sig - Route: 10 mL by Intracatheter route As Needed for Line Care (After Medication Administration or Blood Draw). - Intracatheter    heparin flush (porcine) 100 UNIT/ML injection 500 Units (Discontinued) 5 mL Every 12 Hours Scheduled 6/2/2017 6/2/2017    Sig - Route: 5 mL by Intracatheter route Every 12 (Twelve) Hours. - Intracatheter    heparin flush (porcine) 100 UNIT/ML injection 500 Units (Discontinued) 5 mL As Needed 6/2/2017 6/4/2017    Sig - Route: 5 mL by Intracatheter route As Needed for Line Care (After Medication Administration or Blood Draw If Not Going to Be Utilized Immediately).  - Intracatheter    sodium chloride 0.9 % infusion (Discontinued) 75 mL/hr Continuous 6/3/2017 6/3/2017    Sig - Route: Infuse 75 mL/hr into a venous catheter Continuous. - Intravenous    sodium chloride 0.9 % infusion (Discontinued)  Continuous PRN 6/5/2017 6/5/2017    Sig: Continuous As Needed.          Lab Results (all)     Procedure Component Value Units Date/Time    CBC & Differential [[id number]] Collected:  06/02/17 1432    Specimen:  Blood Updated:  06/02/17 1445    Narrative:       CBC Auto Differential [[id number]]  (Abnormal) Collected:  06/02/17 1432    Specimen:  Blood Updated:  06/02/17 1445     WBC 7.52 10*3/mm3      RBC 3.86 (L) 10*6/mm3      Hemoglobin 11.3 (L) g/dL      Hematocrit 35.0 (L) %      MCV 90.7 fL      MCH 29.3 pg      MCHC 32.3 (L) g/dL      RDW 17.3 (H) %      RDW-SD 57.1 (H) fl      MPV 9.1 fL      Platelets 354 10*3/mm3      Neutrophil % 91.4 (H) %      Lymphocyte % 7.8 (L) %      Monocyte % 0.8 (L) %      Eosinophil % 0.0 (L) %      Basophil % 0.0 %      Immature Grans % 0.0 %      Neutrophils, Absolute 6.87 10*3/mm3      Lymphocytes, Absolute 0.59 (L) 10*3/mm3      Monocytes, Absolute 0.06 (L) 10*3/mm3      Eosinophils, Absolute 0.00 10*3/mm3      Basophils, Absolute 0.00 10*3/mm3      Immature Grans, Absolute 0.00 10*3/mm3     Troponin [[id number]]  (Normal) Collected:  06/02/17 1432    Specimen:  Blood Updated:  06/02/17 1511     Troponin T <0.010 ng/mL     Narrative:       BNP [[id number]]  (Normal) Collected:  06/02/17 1432    Specimen:  Blood Updated:  06/02/17 1522     proBNP 173.0 pg/mL     Narrative:       TSH [[id number]]  (Normal) Collected:  06/02/17 1432    Specimen:  Blood Updated:  06/02/17 1522     TSH 0.437 mIU/mL     T4, Free [[id number]]  (Normal) Collected:  06/02/17 1432    Specimen:  Blood Updated:  06/02/17 1522     Free T4 1.17 ng/dL     Comprehensive Metabolic Panel [[id number]]  (Abnormal) Collected:  06/02/17 1432    Specimen:  Blood Updated:  06/02/17 1537      Glucose 126 (H) mg/dL      BUN 15 mg/dL      Creatinine 0.56 (L) mg/dL      Sodium 135 (L) mmol/L      Potassium 4.5 mmol/L      Chloride 96 (L) mmol/L      CO2 25.4 mmol/L      Calcium 8.7 mg/dL      Total Protein 5.8 (L) g/dL      Albumin 2.80 (L) g/dL      ALT (SGPT) 49 (H) U/L      AST (SGOT) 45 (H) U/L      Alkaline Phosphatase 174 (H) U/L      Total Bilirubin 0.9 mg/dL      eGFR Non African Amer 112 mL/min/1.73      Globulin 3.0 gm/dL      A/G Ratio 0.9 g/dL      BUN/Creatinine Ratio 26.8 (H)     Anion Gap 13.6 mmol/L     CBC (No Diff) [[id number]]  (Abnormal) Collected:  06/03/17 0621    Specimen:  Blood Updated:  06/03/17 0658     WBC 7.05 10*3/mm3      RBC 3.88 (L) 10*6/mm3      Hemoglobin 11.1 (L) g/dL      Hematocrit 34.6 (L) %      MCV 89.2 fL      MCH 28.6 pg      MCHC 32.1 (L) g/dL      RDW 17.3 (H) %      RDW-SD 56.4 (H) fl      MPV 8.6 fL      Platelets 389 10*3/mm3     Lipid Panel [[id number]]  (Abnormal) Collected:  06/03/17 0621    Specimen:  Blood Updated:  06/03/17 0717     Total Cholesterol 143 mg/dL      Triglycerides 95 mg/dL      HDL Cholesterol 38 (L) mg/dL      LDL Cholesterol  86 mg/dL      VLDL Cholesterol 19 mg/dL      LDL/HDL Ratio 2.26    Narrative:       Basic Metabolic Panel [[id number]]  (Abnormal) Collected:  06/03/17 0621    Specimen:  Blood Updated:  06/03/17 0717     Glucose 84 mg/dL      BUN 16 mg/dL      Creatinine 0.56 (L) mg/dL      Sodium 137 mmol/L      Potassium 4.2 mmol/L      Chloride 96 (L) mmol/L      CO2 29.0 mmol/L      Calcium 8.8 mg/dL      eGFR Non African Amer 112 mL/min/1.73      BUN/Creatinine Ratio 28.6 (H)     Anion Gap 12.0 mmol/L     Narrative:       GFR Normal >60  Chronic Kidney Disease <60  Kidney Failure <15    CBC & Differential [[id number]] Collected:  06/04/17 0641    Specimen:  Blood Updated:  06/04/17 0734    Narrative:       CBC Auto Differential [[id number]]  (Abnormal) Collected:  06/04/17 0641    Specimen:  Blood Updated:  06/04/17 0734     WBC  7.36 10*3/mm3      RBC 4.32 10*6/mm3      Hemoglobin 12.5 g/dL      Hematocrit 39.5 %      MCV 91.4 fL      MCH 28.9 pg      MCHC 31.6 (L) g/dL      RDW 17.2 (H) %      RDW-SD 58.5 (H) fl      MPV 9.3 fL      Platelets 430 10*3/mm3      Neutrophil % 71.5 %      Lymphocyte % 23.5 %      Monocyte % 3.5 (L) %      Eosinophil % 0.8 %      Basophil % 0.3 %      Immature Grans % 0.4 %      Neutrophils, Absolute 5.26 10*3/mm3      Lymphocytes, Absolute 1.73 10*3/mm3      Monocytes, Absolute 0.26 10*3/mm3      Eosinophils, Absolute 0.06 10*3/mm3      Basophils, Absolute 0.02 10*3/mm3      Immature Grans, Absolute 0.03 10*3/mm3     Basic Metabolic Panel [[id number]]  (Abnormal) Collected:  06/04/17 0641    Specimen:  Blood Updated:  06/04/17 0817     Glucose 99 mg/dL      BUN 20 mg/dL      Creatinine 0.74 mg/dL      Sodium 132 (L) mmol/L      Potassium 4.4 mmol/L      Chloride 92 (L) mmol/L      CO2 26.8 mmol/L      Calcium 8.9 mg/dL      eGFR Non African Amer 81 mL/min/1.73      BUN/Creatinine Ratio 27.0 (H)     Anion Gap 13.2 mmol/L     Narrative:       CBC & Differential [[id number]] Collected:  06/05/17 0649    Specimen:  Blood Updated:  06/05/17 0706    Narrative:       CBC Auto Differential [[id number]]  (Abnormal) Collected:  06/05/17 0649    Specimen:  Blood Updated:  06/05/17 0706     WBC 5.34 10*3/mm3      RBC 3.96 10*6/mm3      Hemoglobin 11.4 (L) g/dL      Hematocrit 35.9 %      MCV 90.7 fL      MCH 28.8 pg      MCHC 31.8 (L) g/dL      RDW 16.7 (H) %      RDW-SD 55.4 (H) fl      MPV 8.9 fL      Platelets 331 10*3/mm3      Neutrophil % 75.4 %      Lymphocyte % 18.2 (L) %      Monocyte % 4.9 (L) %      Eosinophil % 0.6 %      Basophil % 0.2 %      Immature Grans % 0.7 (H) %      Neutrophils, Absolute 4.03 10*3/mm3      Lymphocytes, Absolute 0.97 10*3/mm3      Monocytes, Absolute 0.26 10*3/mm3      Eosinophils, Absolute 0.03 10*3/mm3      Basophils, Absolute 0.01 10*3/mm3      Immature Grans, Absolute 0.04 (H) 10*3/mm3      Basic Metabolic Panel [[id number]]  (Abnormal) Collected:  06/05/17 0649    Specimen:  Blood Updated:  06/05/17 0730     Glucose 111 (H) mg/dL      BUN 18 mg/dL      Creatinine 0.55 (L) mg/dL      Sodium 134 (L) mmol/L      Potassium 4.0 mmol/L      Chloride 95 (L) mmol/L      CO2 28.3 mmol/L      Calcium 8.8 mg/dL      eGFR Non African Amer 114 mL/min/1.73      BUN/Creatinine Ratio 32.7 (H)     Anion Gap 10.7 mmol/L     Narrative:             Imaging Results (all)     Procedure Component Value Units Date/Time    XR Chest PA & Lateral [[id number]] Collected:  06/02/17 1613     Updated:  06/02/17 1622    Narrative:       PA AND LATERAL CHEST     CLINICAL HISTORY: Metastatic breast carcinoma. Malignant effusion.  Dyspnea.     Compared to the previous portable chest x-ray dated 05/01/2017.     There is a large left pleural effusion producing near complete  compressive atelectasis of the left lung that shows slight interval  increase in size, despite the presence of a Pleurx chest tube. A small  right pleural effusion has also developed in the interval. The right  lung appears slightly better expanded and remains free of focal  infiltrates. There is atelectasis in the medial aspect of the right lung  base. A Mediport device is in place in satisfactory position in the  right subclavian vein without change.     This report was finalized on 6/2/2017 4:19 PM by Dr. Tyson Thompson MD.       US Thoracentesis Right [[id number]] Collected:  06/04/17 1401     Updated:  06/04/17 1404    Narrative:       HISTORY: Right pleural effusion.     PROCEDURE: Ultrasound guided right thoracentesis     Procedure Note: Informed consent was obtained. Preliminary sonography of  the right hemithorax was performed. A pleural effusion was visualized.  The overlying skin was prepped in the usual sterile fashion. Local  anesthesia was achieved with 1% lidocaine. A small nick was made in the  skin with a scalpel. Through the nick was inserted a  needle catheter  device under sonographic guidance. The needle was removed and the  catheter remained and was connected to suction. 850 cc of bloody fluid  was withdrawn. The patient tolerated the procedure without evidence for  complication and left the procedure room in stable condition.       Impression:       1. Technically successful ultrasound guided right thoracentesis.     This report was finalized on 6/4/2017 2:01 PM by Dr. Devon Pastrana MD.       CT Chest With Contrast [[id number]] Collected:  06/03/17 1353     Updated:  06/04/17 1608    Narrative:       EXAMINATION: CT OF THE CHEST WITH CONTRAST     HISTORY: 56-year-old female with history of metastatic breast carcinoma  undergoing possible collapse of the left lung and a pericardial  effusion.     TECHNIQUE: Contiguous axial images were obtained through the chest  following intravenous administration of nonionic contrast.     COMPARISON: Chest x-ray, 06/02/2017.     FINDINGS: There is a moderately large pericardial effusion that measures  on the order of 2.3 cm in thickness. There is a small bore left-sided  chest tube positioned with the tip at the apex of the left pleural  space. A small to moderate-sized left pleural effusion that is  predominantly subpulmonic in volume remains. There is considerable  consolidation throughout the left lung with only approximately 25% of  the left lung demonstrating aeration. Interstitial opacification and  patchy areas of consolidation are noted throughout this region.     There is a large right pleural effusion. Nodular areas of groundglass  opacification is seen within the right upper lobe, right middle lobe and  right lower lobe. Volume loss at the base of the right lower lobe is  noted.     There is evidence of prior left mastectomy. No evidence for axillary  adenopathy is appreciated. The visualized soft tissue structures of the  upper abdomen have a normal appearance.       Impression:       1. Moderately large  pericardial effusion as described.  2. Moderate-sized left pleural effusion with considerable consolidation  and volume loss throughout the left lung. The imaging features suggest a  combination of atelectasis, pneumonia and likely metastatic disease  within the left lung.  3. Large right pleural effusion.  4. Groundglass opacities throughout the right upper lobe and right lower  lobe which may represent pulmonary metastases or an atypical pneumonia,  however pulmonary metastases are favored.     This report was finalized on 6/4/2017 4:05 PM by Dr. Devon Pastrana MD.       XR Chest 1 View [[id number]] Collected:  06/05/17 0853     Updated:  06/05/17 1241    Narrative:       HISTORY: 56-year-old female with metastatic breast cancer, pleural  effusions, shortness of breath, left PleurX catheter, now 1 day status  post right thoracentesis.     PORTABLE AP ERECT CHEST DATED 06/05/2017 AT 0523 HOURS.     FINDINGS: When compared to PA and lateral chest radiographs of  06/02/2017 and CT chest exam of 06/03/2017, there is again subtotal  opacification of the left chest with some modest patchy aeration of left  upper lung parenchyma present. The left PleurX catheter is again  demonstrated. The right subclavian port catheter remains and terminates  in the distribution of superior vena cava. The cardiac silhouette is  largely obscured but appears likely top normal to mildly enlarged  caliber, compatible with demonstration of pericardial effusion on CT  exam of 06/03/2017. Some trace residual right pleural fluid versus  pleural thickening at the lateral right pleural stripe and right  costophrenic angle remain. Monitoring lead wires are present. Oxygen  cannula tubing is present about the neck and right shoulder. No large  pneumothorax is seen. There may be a 1 mm trace superolateral and apical  right pneumothorax. A tiny air bubble at the left apex projecting  adjacent to the PleurX catheter is noted.     CONCLUSION: Decreased  right pleural fluid with minimal if any right  apical pneumothorax. Tiny trace of air lucency at the left apex with  superimposed PleurX catheter noted. There is again subtotal  opacification of the left chest and apparent enlargement of cardiac  silhouette as discussed above.     This report was finalized on 6/5/2017 12:38 PM by Dr. Moe Coon MD.             ECG/EMG Results (all)     Procedure Component Value Units Date/Time    ECG 12 Lead [[id number]] Collected:  06/02/17 1500     Updated:  06/02/17 1505    Narrative:       RR Interval= 465 ms  TX Interval= 144 ms  QRSD Interval= 72 ms  QT Interval= 276 ms  QTc Interval= 405 ms  Heart Rate= 129 ms  P Axis= 64 deg  QRS Axis= 37 deg  T Wave Axis= 122 deg  I: 40 Axis= 47 deg  T: 40 Axis= 22 deg  ST Axis= 131 deg  SINUS TACHYCARDIA  PROBABLE LEFT ATRIAL ABNORMALITY  LOW VOLTAGE IN FRONTAL LEADS  NONSPECIFIC T ABNORMALITIES, LATERAL LEADS  Electronically Signed by:  Date and Time of Study: 2017-06-02 15:00:59    Adult Transthoracic Echo Limited [[id number]] Collected:  06/03/17 1157     Updated:  06/03/17 1345     BSA 1.7 m^2      BH CV ECHO JENNIFFER - BZI_BMI 19.4 kilograms/m^2      BH CV ECHO JENNIFFER - BSA(AvazCOCK) 1.6 m^2      BH CV ECHO JENNIFFER - BZI_METRIC_WEIGHT 56.2 kg      BH CV ECHO JENNIFFER - BZI_METRIC_HEIGHT 170.2 cm      BH CV VAS BP RIGHT /77 mmHg     Narrative:       · Left ventricular wall thickness is consistent with mild concentric   hypertrophy.  · Left ventricular systolic function is normal.  · Left atrial cavity size is mildly dilated.  · There is a moderate (1-2cm) circumferential pericardial effusion. The   presence of cardiac tamponade cannot be excluded.       Adult Transthoracic Echo Limited [[id number]] Collected:  06/05/17 0822     Updated:  06/05/17 0907     BSA 1.7 m^2      BH CV ECHO JENNIFFER - BZI_BMI 19.4 kilograms/m^2      BH CV ECHO JENNIFFER - BSA(HAYCOCK) 1.6 m^2      BH CV ECHO JENNIFFER - BZI_METRIC_WEIGHT 56.2 kg       CV ECHO JENNIFFER -  BZI_METRIC_HEIGHT 170.2 cm           Orders (all)     Start     Ordered    06/06/17 0500  ECG 12 Lead  Tomorrow AM      06/05/17 1346    06/05/17 1400  lactulose (CHRONULAC) 10 GM/15ML solution 30 mL  Once      06/05/17 1323    06/05/17 1250  Diet Regular  Diet Effective Now     Comments:  Please send bottled water with meals.    06/05/17 1250    06/05/17 1139  Provide Equipment / Supplies At Bedside  Until Discontinued     Comments:  PleurX catheter drainage kits 500 ml X 3    06/05/17 1139    06/05/17 1139  Drain Care  Once      06/05/17 1139    06/05/17 0915  Diet Regular  Diet Effective Now,   Status:  Canceled      06/05/17 0914    06/05/17 0913  Aborted Cath Cath  Once      06/05/17 0912    06/05/17 0903  Nursing Communication Restart diet  Continuous     Comments:  Restart diet    06/05/17 0903    06/05/17 0849  sodium chloride 0.9 % infusion  Continuous PRN,   Status:  Discontinued      06/05/17 0849    06/05/17 0830  alteplase ((CATHFLO/ACTIVASE)) syringe 2 mg  Once     Comments:  For IV team use in implanted port.    06/05/17 0759    06/05/17 0817  Adult Transthoracic Echo Limited  Once     Comments:  Dr SAMARA Perez in cath lab    06/05/17 0817    06/05/17 0637  Cardiac Catheterization/Vascular Study  Once      06/05/17 0636    06/05/17 0600  XR Chest 1 View  1 Time Imaging      06/04/17 1140    06/05/17 0600  Basic Metabolic Panel  Morning Draw      06/04/17 1228    06/05/17 0600  CBC Auto Differential  PROCEDURE ONCE      06/05/17 0001    06/05/17 0001  NPO Diet  Diet Effective Midnight,   Status:  Canceled      06/04/17 1153    06/05/17 0001  NPO Diet  Diet Effective Midnight,   Status:  Canceled      06/04/17 1411    06/04/17 1830  lactulose (CHRONULAC) 10 GM/15ML solution 30 mL  Once      06/04/17 1755    06/04/17 1412  Nursing Communication Obtain consent for pericardiocentesis  Continuous     Comments:  Obtain consent for pericardiocentesis    06/04/17 1411    06/04/17 1409  Case Request Cath  Lab: Pericardiocentesis  Once      06/04/17 1409    06/04/17 1345  lidocaine (XYLOCAINE) 1 % injection 20 mL  Once      06/04/17 1312    06/04/17 1115  US Thoracentesis Right  1 Time Imaging      06/04/17 1115    06/04/17 1058  Empty Drain  Once     Comments:  Left Pleurx drain    06/04/17 1103    06/04/17 0600  CBC & Differential  Daily      06/03/17 1109    06/04/17 0600  Basic Metabolic Panel  Morning Draw      06/03/17 1326    06/04/17 0600  CBC Auto Differential  PROCEDURE ONCE      06/04/17 0001    06/03/17 1607  Conditional Code  Continuous      06/03/17 1607    06/03/17 1458  Diet Regular  Diet Effective Now,   Status:  Canceled      06/03/17 1457    06/03/17 1330  iopamidol (ISOVUE-300) 61 % injection 100 mL  Once in Imaging      06/03/17 1245    06/03/17 1326  PT Consult: Eval & Treat  Once      06/03/17 1326    06/03/17 1115  sodium chloride 0.9 % infusion  Continuous,   Status:  Discontinued      06/03/17 1033    06/03/17 1033  Empty Drain  Once     Comments:  Drain Pleurex    06/03/17 1033    06/03/17 1009  Adult Transthoracic Echo Limited  Once     Comments:  To evaluate pericardial effusion size and please check mitral and tricuspid dopplers  Obtain subcostal views    06/03/17 1011    06/03/17 1002  CT Chest With Contrast  1 Time Imaging      06/03/17 1001    06/03/17 0600  CBC (No Diff)  Morning Draw      06/02/17 1347    06/03/17 0600  Basic Metabolic Panel  Morning Draw      06/02/17 1347    06/03/17 0600  Lipid Panel  Morning Draw      06/02/17 1427    06/03/17 0001  NPO Diet  Diet Effective Midnight,   Status:  Canceled     Comments:  Per order Dr hancock    06/02/17 1729    06/03/17 0001  NPO Diet  Diet Effective Midnight,   Status:  Canceled      06/02/17 2338    06/02/17 2329  Inpatient Consult to Hematology & Oncology  Once     Specialty:  Hematology and Oncology  Provider:  Ted Manley MD    06/02/17 2332    06/02/17 2230  Inpatient Admission  Once      06/02/17 2242    06/02/17  2200  Snack:  3 Times Daily      06/02/17 1603    06/02/17 2100  sodium chloride 0.9 % flush 10 mL  Every 12 Hours Scheduled      06/02/17 1845    06/02/17 2100  heparin flush (porcine) 100 UNIT/ML injection 500 Units  Every 12 Hours Scheduled,   Status:  Discontinued      06/02/17 1845    06/02/17 2035  HYDROmorphone (DILAUDID) injection 1 mg  Every 2 Hours PRN      06/02/17 2035 06/02/17 1845  Connectors / Hubs Must Be Scrubbed 15 Seconds Using 70% Alcohol or 2% Chlorhexidine Before Access - Allow to Dry Before Accessing Line  Continuous      06/02/17 1845 06/02/17 1845  Change Transparent Dressing Every 7 Days  Weekly      06/02/17 1845 06/02/17 1845  Change Mandel Needle When Changing Dressing  Weekly      06/02/17 1845 06/02/17 1844  sodium chloride 0.9 % flush 10 mL  As Needed      06/02/17 1845 06/02/17 1844  heparin flush (porcine) 100 UNIT/ML injection 500 Units  As Needed,   Status:  Discontinued      06/02/17 1845    06/02/17 1800  Dietary Nutrition Supplements Ensure Enlive  Daily With Breakfast, Lunch & Dinner     Comments:  With meals and bedtime    06/02/17 1602    06/02/17 1552  Drain Care  Once      06/02/17 1552    06/02/17 1552  Inpatient Consult For PICC  Once     Comments:  After Line Placement, Flushes and Line Care Should Be Ordered Using the Line Care (Flushes & Dressing Changes) - All Line Types Order Set   Provider:  (Not yet assigned)    06/02/17 1552    06/02/17 1545  alteplase ((CATHFLO/ACTIVASE)) injection 2 mg  Once      06/02/17 1514    06/02/17 1536  XR Chest PA & Lateral  1 Time Imaging      06/02/17 1535    06/02/17 1507  Full Code  Continuous,   Status:  Canceled      06/02/17 1507    06/02/17 1430  fentaNYL (DURAGESIC) 25 MCG/HR patch 1 patch  Every 72 Hours      06/02/17 1347    06/02/17 1430  Scopolamine (TRANSDERM-SCOP) 1.5 MG/3DAYS patch 1 patch  Every 72 Hours      06/02/17 1347    06/02/17 1430  sennosides-docusate sodium (SENOKOT-S) 8.6-50 MG tablet 2 tablet   Daily      06/02/17 1347    06/02/17 1430  ECG 12 Lead  Once      06/02/17 1429    06/02/17 1429  Troponin  Once      06/02/17 1428    06/02/17 1428  BNP  Once      06/02/17 1427    06/02/17 1428  TSH  Once      06/02/17 1427    06/02/17 1428  T4, Free  Once      06/02/17 1427    06/02/17 1412  Inpatient Consult to Cardiology  Once     Specialty:  Cardiology  Provider:  Hemant Perez MD    06/02/17 1411    06/02/17 1400  Inpatient Consult to Cardiology  Once,   Status:  Canceled     Specialty:  Cardiology  Provider:  Hemant Perez MD    06/02/17 1400    06/02/17 1358  Inpatient Consult to Cardiology  Once,   Status:  Canceled     Specialty:  Cardiology  Provider:  Hemant Perez MD    06/02/17 1358    06/02/17 1348  Place Sequential Compression Device  Once      06/02/17 1347    06/02/17 1345  XR Chest PA & Lateral  1 Time Imaging,   Status:  Canceled      06/02/17 1347    06/02/17 1345  Diet Regular  Diet Effective Now,   Status:  Canceled      06/02/17 1347    06/02/17 1345  CBC & Differential  Once      06/02/17 1347    06/02/17 1345  Comprehensive Metabolic Panel  Once      06/02/17 1347    06/02/17 1345  Inpatient Consult to Cardiothoracic Surgery  Once     Provider:  Asael Arriaga MD    06/02/17 1347    06/02/17 1345  CBC Auto Differential  PROCEDURE ONCE      06/02/17 1347    06/02/17 1343  promethazine (PHENERGAN) tablet 12.5 mg  Every 6 Hours PRN      06/02/17 1347    06/02/17 1343  prochlorperazine (COMPAZINE) tablet 10 mg  Every 6 Hours PRN      06/02/17 1347    06/02/17 1343  ondansetron (ZOFRAN) tablet 8 mg  Every 8 Hours PRN      06/02/17 1347    06/02/17 1343  Glycerin-Hypromellose- (ARTIFICIAL TEARS) 0.2-0.2-1 % ophthalmic solution solution 1 drop  Every 1 Hour PRN      06/02/17 1347    06/02/17 1343  bisacodyl (DULCOLAX) EC tablet 10 mg  Daily PRN      06/02/17 1347    06/02/17 1232  Inpatient Consult to Case Management   Once     Provider:  (Not  yet assigned)    06/02/17 1231    06/02/17 1225  Inpatient Consult to Nutrition  Once     Provider:  (Not yet assigned)    06/02/17 1224    Unscheduled  Change Dressing to IV Site As Needed When Damp, Loose or Soiled  As Needed      06/02/17 1845    Unscheduled  Change Mandel Needle As Needed  As Needed      06/02/17 1845    --  bisacodyl (DULCOLAX) 5 MG EC tablet  Daily PRN      06/02/17 1215             Physician Progress Notes (all)      Joel Lopez MD at 6/3/2017 10:33 AM  Version 1 of 1         Subjective     CHIEF COMPLAINT:     Metastatic breat cancer.    HISTORY OF PRESENT ILLNESS:    Yury Mott is a 56 y.o. patient with metastatic HER-2 positive breast cancer to brain, liver, bone and chest wall.  She progressed on recent therapy with Tykerb with Xeloda and she was intolerant to Navelbine with Herceptin (chest pain during infusion).  She was seen by Dr. Manley at our office on 5/30/17 and her treatment was switched to Abraxane and Herceptin.    The patient has history of a malignant left pleural effusion causing SOB.  She had PleuRx catheter placed on 5/1/17. She was initially draining up to 300 mL of fluid.     The patient recently started having increasing SOB and chest pain. The output from the PleurX catheter recently decreased to a very small amount of fluid a few days ago.        She was seen by Dr. Perez on 6/2/17 and was found to have a large pericardial effusion.  She was directly admitted to the hospital.      SCHEDULED MEDS:    fentaNYL 1 patch Transdermal Q72H   Scopolamine 1 patch Transdermal Q72H   sennosides-docusate sodium 2 tablet Oral Daily   sodium chloride 10 mL Intracatheter Q12H     INFUSIONS:    sodium chloride 75 mL/hr     PRN MEDS:  •  bisacodyl  •  Glycerin-Hypromellose-  •  heparin flush (porcine)  •  HYDROmorphone  •  ondansetron  •  prochlorperazine  •  promethazine  •  sodium chloride    REVIEW OF SYSTEMS:  GENERAL:  weak.   SKIN:  No skin rashes or  lesions.  HEME/LYMPH: Mild Anemia.   RESPIRATORY:  Shortness of breath. Cough.   CVS:  Chest pain. Palpitations. No Lower extremity swelling.  GI:  No Abdominal pain. No Nausea. No Vomiting.   MUSCULOSKELETAL:  No Bone pain. No Joint pain. No Joint stiffness.    Objective   VITAL SIGNS:  Temp:  [98.1 °F (36.7 °C)-98.2 °F (36.8 °C)] 98.1 °F (36.7 °C)  Heart Rate:  [107-127] 115  Resp:  [18-22] 18  BP: (119-124)/(77-85) 120/77    Wt Readings from Last 3 Encounters:   06/03/17 124 lb (56.2 kg)   06/02/17 127 lb (57.6 kg)   05/30/17 127 lb 12.8 oz (58 kg)         PHYSICAL EXAMINATION  GENERAL:  The patient is dyspneic and tachypneic.  SKIN:  No skin rashes or lesions. No ecchymosis or petechiae.  HEAD:  Normocephalic.  EYES:  No Jaundice. Pallor.   NECK:  Supple with Good ROM. No Thyromegaly. No Masses.  LYMPHATICS:  No Lymphadenopathy.  CHEST:  Decrease in breath sounds in left lung base.  CARDIAC:  Tachycardic.  ABDOMEN:  Soft. No tenderness. No Hepatomegaly. No Splenomegaly.   EXTREMITIES:  No Edema. No Cyanosis. No Calf tenderness.  NEUROLOGICAL:  No Focal neurological deficits.    Assessment/Plan   1.  Metastatic HER-2 positive breast cancer to brain, liver, bone and chest wall.  She progressed on recent therapy with Tykerb with Xeloda and she was intolerant to Navelbine with Herceptin.  She was seen by Dr. Manley at our office on 5/30/17 and her treatment was switched to Abraxane and Herceptin.    2.  Pleural effusion.  The output has recently decreased.  Therefore, Activase was instilled into PelurX catheter on 6/2/17. The staff will attempt to drain the tube this morning.       3.  Pericardial effusion. She was seen by Dr. Perez on 6/2/17 and had an ECHO done revealing a large pericardial effusion. She does not have cardiac tamponade.  He is going to discuss with thoracic surgery having a pericardial window.     4.  History of neutropenia due to chemotherapy. The patient received her chemotherapy on  5/30/17 and her WBC and ANC counts remain adequate. We will monitor.         Joel Lopez MD  06/03/17             Electronically signed by Joel Lopez MD at 6/3/2017 11:09 AM      Evaristo Bansal MD at 6/3/2017 11:19 AM  Version 1 of 1         Patient is seen and examined and case discussed at length with Dr. Parks.  Patient with advanced metastatic breast cancer.  She was seen by her cardiologist and had an echocardiogram as an outpatient or pericardial effusion was identified without signs of pericardial tamponade.  The patient was admitted.  She is more short of breath and she is at her baseline.  She is having some upper chest discomfort.  About a month ago she had a large left-sided pleural effusion drained and a Pleurx catheter was placed.  She has been symptomatically improved since that time.  Currently the Pleurx catheter is not draining very much.  Chest x-ray obtained yesterday shows slight mediastinal shift towards the side of the opacity on the left indicating that there is not a large pleural effusion causing hemodynamic compromise.  A chest CT scan is ordered for today and is pending.  Similarly, a repeat echocardiogram is been ordered.  The patient has intermittent coughing particularly when changing positions.  She short of breath with exertion.  She has a resting tachycardia with a heart rate between 100 and 125.  Chest radiographs have been personally examined by me and I reviewed the radiology reports.  Laboratories are reviewed renal function is normal.  Hemoglobin is stable.  Platelet count is adequate  Review of systems no head neck chest or abdominal pain.  Other than reported above.  Physical examination afebrile vital signs stable sclerae are anicteric neck is supple trachea is midline no jugular venous distention decreased breath sounds at left base with dullness to percussion of the left base.  Right side is clear.  Abdomen is nondistended nontender.  Extremities are  free of cyanosis clubbing or edema.  Mentation is normal neurological examination is nonfocal.    Echocardiogram is reviewed and examined with cardiology.  Report also reviewed.    #1 pericardial effusion with possible early tapenade getting a repeat echo today  #2 left chest opacity-I think this is more consistent with atelectasis however chest CT scan with IV contrast will be performed today to clarify the issue.  #3 metastatic breast cancer with extensive metastases  Plan we'll follow-up on radiographic studies and discussed with cardiology     Electronically signed by Evaristo Bansal MD at 6/3/2017 11:26 AM      Dayna Benitez MD at 6/3/2017 11:58 AM  Version 1 of 1         DAILY PROGRESS NOTE  [facility]    Patient Identification:  Name: Yury Mott  Age: 56 y.o.  Sex: female  :  1960  MRN: [id number]         Primary Care Physician: Ted Manley MD    Subjective:very slight improvement in breathing after fluid removed. Awaiting results of tests. Just returned to room.  Interval History: 55 yo wf with breast cancer, recurrent pleural effusions admitted 17 with increased SOA. Removed 350cc pleural fluid today. Possible pericardial fluid. ECHO and CT pending. Cardio and CTS consulted.      Objective:    Scheduled Meds:  fentaNYL 1 patch Transdermal Q72H   Scopolamine 1 patch Transdermal Q72H   sennosides-docusate sodium 2 tablet Oral Daily   sodium chloride 10 mL Intracatheter Q12H     Continuous Infusions:  sodium chloride 75 mL/hr Last Rate: 75 mL/hr (17 1052)       Vital signs in last 24 hours:  Temp:  [98.1 °F (36.7 °C)-98.2 °F (36.8 °C)] 98.1 °F (36.7 °C)  Heart Rate:  [107-127] 115  Resp:  [18-22] 18  BP: (119-124)/(77-85) 120/77    Intake/Output:    Intake/Output Summary (Last 24 hours) at 17 1158  Last data filed at 17 1105   Gross per 24 hour   Intake                0 ml   Output              350 ml   Net             -350 ml       Exam:  /77 (BP  "Location: Right arm, Patient Position: Lying)  Pulse 115  Temp 98.1 °F (36.7 °C) (Oral)   Resp 18  Ht 67\" (170.2 cm)  Wt 124 lb (56.2 kg)  SpO2 97%  BMI 19.42 kg/m2    General Appearance:    Alert, cooperative, no distress, AAOx3                          Head:    Normocephalic, without obvious abnormality, atraumatic                           Eyes:    PERRL, conjunctiva/corneas clear, EOM's intact, both eyes                         Throat:   Lips, tongue, gums normal; oral mucosa pink and moist                           Neck:   Supple, symmetrical, trachea midline, no JVD                         Lungs:    Clear to auscultation bilaterally, respirations unlabored                 Chest Wall:    No tenderness or deformity. Port on R side                          Heart:    Regular rate and rhythm, S1 and S2 normal, no murmur,rub or gallop                  Abdomen:     Soft, non-tender, bowel sounds active, no masses, no organomegaly                  Extremities:   Extremities normal, atraumatic, no cyanosis or edema                        Pulses:   Pulses palpable in all extremities                            Skin:   Skin is warm and dry,  no rashes or palpable lesions                  Neurologic:   CNII-XII intact, motor strength grossly intact, no focal deficits noted     Assessment:  Principal Problem:    Pericardial effusion without cardiac tamponade  Active Problems:    Breast cancer metastasized to brain    Breast cancer metastasized to liver    Malignant neoplasm metastatic to left lung    Dyspnea, unspecified    Bilateral pleural effusion    Cancer-related pain    Pleuritic chest pain    Monitor and correct electrolytes    PT consult    Plan:  Await results of testing  Symptom management  Onc consult  Follow all consultant recs  Monitor and correct electrolytes    Dayna Benitez MD  6/3/2017  11:58 AM     Electronically signed by Dayna Benitez MD at 6/3/2017  1:26 PM      Hemant Perez MD at " 6/3/2017  7:51 PM  Version 2 of 2                                [facility]  Cardiology Progress Note    Patient Identification:  Name:Yury Mott  Age:56 y.o.  Sex: female  :  1960  MRN: [id number]             Length of Stay: 1    Follow up for:  Pericardial effusion      Interval History :  Discussed with Dr. Bansal. Repeat echo showed moderately large pericardial effusion but his main concern is the complete opacificaiton of the left lung which he thinks is most likely due to metastatic disease with atelectasis rather than fluid. At this time her BP is ok. Overall her prognosis is guarded and she doesn't want to be resuscitated. Her tachycardia is multifactorial and pulmonary issue may be contributing more. Not much JVD elevation and not significant pulsus paradoxus.  We decided to hold any pericardial drain today    HPI    The following portions of the patient's history were reviewed and updated as appropriate: allergies, current medications, past family history, past medical history, past social history, past surgical history and problem list.  Past Medical History:   Diagnosis Date   • Anemia     intermittent which responded to iron.    • Breast cancer     Left, ER/[place] negative, HER-2 positive    • Liver metastasis     biopsy proven    • Metastasis to brain     irradiated on 7/10/2015 w/ focus radiation       Scheduled Meds:    Current Facility-Administered Medications:   •  bisacodyl (DULCOLAX) EC tablet 10 mg, 10 mg, Oral, Daily PRN, Chester Lynne MD  •  fentaNYL (DURAGESIC) 25 MCG/HR patch 1 patch, 1 patch, Transdermal, Q72H, Chester Lynne MD, 1 patch at 17 3293  •  Glycerin-Hypromellose- (ARTIFICIAL TEARS) 0.2-0.2-1 % ophthalmic solution solution 1 drop, 1 drop, Both Eyes, Q1H PRN, Chester Lynne MD  •  heparin flush (porcine) 100 UNIT/ML injection 500 Units, 5 mL, Intracatheter, PRN, Yokasta Luo, APRN, 500 Units at 17 3443  •  HYDROmorphone (DILAUDID)  "injection 1 mg, 1 mg, Intravenous, Q2H PRN, Chester Lynne MD, 1 mg at 06/03/17 1620  •  ondansetron (ZOFRAN) tablet 8 mg, 8 mg, Oral, Q8H PRN, Chester Lynne MD  •  prochlorperazine (COMPAZINE) tablet 10 mg, 10 mg, Oral, Q6H PRN, Chester Lynne MD  •  promethazine (PHENERGAN) tablet 12.5 mg, 12.5 mg, Oral, Q6H PRN, Chester Lynne MD, 12.5 mg at 06/03/17 0634  •  Scopolamine (TRANSDERM-SCOP) 1.5 MG/3DAYS patch 1 patch, 1 patch, Transdermal, Q72H, Chester Lynne MD, 1 patch at 06/03/17 0853  •  sennosides-docusate sodium (SENOKOT-S) 8.6-50 MG tablet 2 tablet, 2 tablet, Oral, Daily, Chester Lynne MD, 2 tablet at 06/02/17 1832  •  sodium chloride 0.9 % flush 10 mL, 10 mL, Intracatheter, Q12H, Yokasta N Head, APRN, 10 mL at 06/03/17 0854  •  sodium chloride 0.9 % flush 10 mL, 10 mL, Intracatheter, PRN, Yokasta N Head, APRN    All systems were reviewed and negative except for:  Respiratory: positive for  shortness of air    /74 (BP Location: Right arm, Patient Position: Lying)  Pulse 115  Temp 98.1 °F (36.7 °C) (Oral)   Resp 18  Ht 67\" (170.2 cm)  Wt 124 lb (56.2 kg)  SpO2 93%  BMI 19.42 kg/m2       Intake/Output Summary (Last 24 hours) at 06/03/17 1952  Last data filed at 06/03/17 1600   Gross per 24 hour   Intake              450 ml   Output              350 ml   Net              100 ml          Wt Readings from Last 1 Encounters:   06/03/17 0537 124 lb (56.2 kg)   06/02/17 1207 123 lb 4.8 oz (55.9 kg)       Physical Examination: By         Physical Exam    General: Mild respiratory distress and short of breath   Skin: Warm and dry, no diaphoresis noted   HEENT: No ptosis;  oral mucosa moist   Neck: Supple; no carotid bruits; no JVD, Trachea mid line   Heart: S1S2  regular rate and rhythm;  no murmurs; no gallop or rub appreciated, No thrills palpable   Chest: Respirations unlabored at rest, bilateral breath sounds have decreased air entry; left side worse, no  wheezes auscultated.   "   Abdomen: Soft, non-tender, -distended, positive bowel sounds  ,No aneurysms palpable   Extremities: Bilateral lower extremities have no pre-tibial pitting edema; Radials are palpable   Neurological: Alert and oriented ; no new motor deficits,       Lab Review:  Personally reviewed the labs, radiology imaging and other cardiac procedures.         Patient Active Problem List   Diagnosis   • Breast cancer metastasized to brain   • Breast cancer metastasized to liver   • Encounter for long-term (current) use of high-risk medication   • Fitting and adjustment of vascular catheter   • Intractable pain   • Nausea and vomiting   • Malignant neoplasm metastatic to left lung   • Leukocytopenia   • Anemia associated with chemotherapy   • Dyspnea, unspecified   • Hyponatremia syndrome   • Bilateral pleural effusion   • Chemotherapy induced neutropenia   • Malignant neoplasm of other specified sites of female breast   • Cancer-related pain   • Pleuritic chest pain   • Pericardial effusion without cardiac tamponade       Assessment/ Plan :    Moderately large pericardial effusion with pretamponade physiology with probably bilateral lung tumor metastasis. Patient want to be DNR. Will d/w Thoracic and Oncology MD's in am to decide further plan.    Discussed with  and changed to code status to dnr    I not only counseled the patient today on the significant factors noted in the assessment and plan, but I also recommended that the patient reduce salt and saturated animal fat intake in diet, as well as to perform scheduled exercise on a regular basis.    Mg Perez MD, FACC  6/3/31813:52 PM    Patient personally interviewed and above subjective findings personally confirmed during a face to face contact with patient today, and I personally performed the  physical examination.  All labs, cardiac procedures,pertinent radiographs of the last 24 hours personally reviewed, Impression and plans discussed/elaborated and  "implemented by me or jointly as described above -----------Mg Perez MD       Electronically signed by Hemant Perez MD at 6/3/2017  8:06 PM           Dayna Benitez MD at 2017 10:53 AM  Version 1 of 1         DAILY PROGRESS NOTE  [facility]    Patient Identification:  Name: Yury Mott  Age: 56 y.o.  Sex: female  :  1960  MRN: [id number]         Primary Care Physician: Ted Manley MD    Subjective:feels ok. Able to ambulate to bathroom   Interval History: 55 yo wf with breast cancer, recurrent pleural effusions admitted 17 with increased SOA. Removed 350cc pleural fluid yesterday.Pericardial fluid per ECHO.CT results reviewed. Cardio and CTS on board.Plan for R sided thoracentesis, needle aspiration of pericardial fluid.     Objective:    Scheduled Meds:  fentaNYL 1 patch Transdermal Q72H   Scopolamine 1 patch Transdermal Q72H   sennosides-docusate sodium 2 tablet Oral Daily   sodium chloride 10 mL Intracatheter Q12H     Continuous Infusions:     Vital signs in last 24 hours:  Temp:  [98.1 °F (36.7 °C)-98.7 °F (37.1 °C)] 98.2 °F (36.8 °C)  Heart Rate:  [114-116] 115  Resp:  [18-22] 18  BP: (103-113)/(60-74) 103/60    Intake/Output:    Intake/Output Summary (Last 24 hours) at 17 1053  Last data filed at 17 1600   Gross per 24 hour   Intake              450 ml   Output              350 ml   Net              100 ml       Exam:  /60 (BP Location: Right arm, Patient Position: Lying)  Pulse 115  Temp 98.2 °F (36.8 °C) (Oral)   Resp 18  Ht 67\" (170.2 cm)  Wt 124 lb (56.2 kg)  SpO2 93%  BMI 19.42 kg/m2    General Appearance:   Alert, cooperative, no distress, AAOx3  Head:   Normocephalic, without obvious abnormality, atraumatic  Eyes:   PERRL, conjunctiva/corneas clear, EOM's intact, both eyes  Throat:  Lips, tongue, gums normal; oral mucosa pink and moist  Neck:  Supple, symmetrical, trachea midline, no JVD  Lungs: Diminished breath sounds " bilaterally(L>R), respirations unlabored  Chest Wall:   No tenderness or deformity. Port on R side  Heart:   Regular rate and rhythm, S1 and S2 normal, no murmur,rub or gallop  Abdomen:   Soft, non-tender, bowel sounds active, no masses, no organomegaly   Extremities:  Extremities normal, atraumatic, no cyanosis or edema  Pulses:  Pulses palpable in all extremities  Skin:  Skin is warm and dry, no rashes or palpable lesions  Neurologic:  CNII-XII intact, motor strength grossly intact, no focal deficits noted     Assessment:  Principal Problem:    Pericardial effusion without cardiac tamponade  Active Problems:    Breast cancer metastasized to brain    Breast cancer metastasized to liver    Malignant neoplasm metastatic to left lung    Dyspnea, unspecified    Bilateral pleural effusion    Cancer-related pain    Pleuritic chest pain      Plan:  Follow consultant recs  Symptom management  monitor and correct electrolytes  EOL discussion with pt today. Not ready for hospice care. Not sure yet if she wants to die at home. Trying to decide about when sons should come home. Both in  training right now.        Dayna Benitez MD  6/4/2017  10:53 AM     Electronically signed by Dayna Benitez MD at 6/4/2017 12:30 PM      Evaristo Bansal MD at 6/4/2017 11:32 AM  Version 1 of 1         Patient seen and examined.  Results of studies gone over with the patient and her .  All questions answered.  Also discussed her case at length with cardiology consultant.  CT scan of the chest examined.  Echocardiographic report reviewed.    Chief complaint shortness of breath    Patient's symptoms are about the same as they were yesterday.  has frequent coughing when changing position.  Minor right upper chest discomfort.  He also has generalized fullness or pressure feeling within the chest.  Cough is nonproductive.  No hemoptysis.    Review of systems no head neck abdominal or extremity pain.    CT scan examined.  Most of  the left lung is consolidated.  There is residual fluid within the left chest but this does not appear to be causing the lung consolidation.  The Pleurx catheter is in the middle of the pleural fluid.  There is a large right pleural effusion.  We'll drain ultrasonographically today.  There is a moderate pericardial effusion.  Echocardiogram shows no evidence of tamponade.    The Pleurx catheter draining to 350 cc after thrombolytics were placed.  We'll attempt re-drainage of Pleurx catheter today.    Patient is concerned about her duration of survival.  Her 2 children out of town and will not be available until the end of the month.    I am concerned about surgical intervention in this patient.  She may have lymphangitic spread within her left lung.  General anesthetic may exacerbate this process.    I recommend percutaneous drainage of the right pleural effusion and percutaneous drainage of the pericardial effusion as well as therapeutic and a diagnostic procedure.  If her symptoms are particularly improved by the pericardial drainage then a pericardial window may be performed perhaps even under monitored anesthesia care.  We'll get repeat radiographic imaging tomorrow to assess the pleural catheter drainage and the post thoracentesis drainage.     Electronically signed by Evaristo Bansal MD at 6/4/2017 11:37 AM      Evaristo Bansal MD at 6/4/2017 11:38 AM  Version 1 of 1         Addendum to previous note.  #1 widely metastatic breast cancer #2 moderate pericardial effusion without tamponade 9 #3 left lung consolidation possible lymphangitic spread number for enlarging right pleural effusion #5 relatively small left pleural effusion probably loculated.     Electronically signed by Evaristo Bansal MD at 6/4/2017 11:39 AM      Joel Lopez MD at 6/4/2017 12:01 PM  Version 1 of 1         Subjective     CHIEF COMPLAINT:     Metastatic breast cancer.    HISTORY OF PRESENT ILLNESS:    The patient continues to  feel short of breath and continues to cough.  No hemoptysis.        Past Medical History:   Diagnosis Date   • Anemia     intermittent which responded to iron.    • Breast cancer     Left, ER/PA negative, HER-2 positive    • Liver metastasis     biopsy proven    • Metastasis to brain     irradiated on 7/10/2015 w/ focus radiation       Past Surgical History:   Procedure Laterality Date   • MASTECTOMY Left 12/30/2014   • PLEURAL CATHETER INSERTION Left 5/1/2017    Procedure: PLEURX CATHETER INSERTION;  Surgeon: Asael Arriaga MD;  Location: [facility];  Service:    • PORTACATH PLACEMENT  06/2014       SCHEDULED MEDS:    fentaNYL 1 patch Transdermal Q72H   Scopolamine 1 patch Transdermal Q72H   sennosides-docusate sodium 2 tablet Oral Daily   sodium chloride 10 mL Intracatheter Q12H     INFUSIONS:     PRN MEDS:  •  bisacodyl  •  Glycerin-Hypromellose-  •  heparin flush (porcine)  •  HYDROmorphone  •  ondansetron  •  prochlorperazine  •  promethazine  •  sodium chloride    REVIEW OF SYSTEMS:  GENERAL:  weak.   SKIN:  No skin rashes or lesions.  HEME/LYMPH: Anemia has improved.   RESPIRATORY:  Shortness of breath. Cough.   CVS:  Chest pain. Palpitations. No Lower extremity swelling.  GI:  No Abdominal pain.        Objective   VITAL SIGNS:  Temp:  [98.1 °F (36.7 °C)-98.7 °F (37.1 °C)] 98.2 °F (36.8 °C)  Heart Rate:  [114-116] 115  Resp:  [18-22] 18  BP: (103-113)/(60-74) 103/60    Wt Readings from Last 3 Encounters:   06/04/17 124 lb (56.2 kg)   06/02/17 127 lb (57.6 kg)   05/30/17 127 lb 12.8 oz (58 kg)         PHYSICAL EXAMINATION  GENERAL:  The patient is dyspneic and tachypneic.  SKIN:  No skin rashes or lesions. No ecchymosis or petechiae.  HEAD:  Normocephalic.  EYES:  No Jaundice. Pallor.   CHEST:  Decrease in breath sounds in the mid and lowe lung fields bilaterally.  CARDIAC:  Tachycardic.         CT scan chest on 6/3/17:  There is a moderately large pericardial effusion that measures  on the order  of 2.3 cm in thickness. There is a small bore left-sided  chest tube positioned with the tip at the apex of the left pleural  space. A small to moderate-sized left pleural effusion that is  predominantly subpulmonic in volume remains. There is considerable  consolidation throughout the left lung with only approximately 25% of  the left lung demonstrating aeration. Interstitial opacification and  patchy areas of consolidation are noted throughout this region.      There is a large right pleural effusion. Nodular areas of groundglass  opacification is seen within the right upper lobe, right middle lobe and  right lower lobe. Volume loss at the base of the right lower lobe is  noted.      There is evidence of prior left mastectomy. No evidence for axillary  adenopathy is appreciated. The visualized soft tissue structures of the  upper abdomen have a normal appearance.      IMPRESSION:  1. Moderately large pericardial effusion as described.  2. Moderate-sized left pleural effusion with considerable consolidation  and volume loss throughout the left lung. The imaging features suggest a  combination of atelectasis, pneumonia and likely metastatic disease  within the left lung.  3. Large right pleural effusion.  4. Groundglass opacities throughout the right upper lobe and right lower  lobe which may represent pulmonary metastases or an atypical pneumonia,  however pulmonary metastases are favored.  I personally reviewed the CT scan.     Assessment/Plan   1.  Metastatic HER-2 positive breast cancer to brain, liver, bone and chest wall.  She progressed on recent therapy with Tykerb with Xeloda and she was intolerant to Navelbine with Herceptin.  Her treatment was switched on 5/30/17 to Abraxane and Herceptin. There is significant consolidation in the left lung concerning for it being due to the tumor as well as lymphangitic spread (only 25% of the lung is aerated).     2.  Bilateral Pleural effusion.  After the use of  Activase, 350 mL fluid drained from left. It will be drained again todoay. She will also have right ultrasound guided thoracentesis today.        3.  Pericardial effusion. On 17,  ECHO revealed a large pericardial effusion without cardiac tamponade. Thoracic surgery recommended percutaneous drainage of the pericardial effusion first. If her symptoms improve, pericardial window will be considered.      4.  History of neutropenia due to chemotherapy. The patient received her chemotherapy on 17 and her WBC and ANC are normal.     I explained to the patient that if she does not feel better with drainage of the fluid, her symptoms would be attributed to the metastatic disease in the chest. Improvement in her symptoms will therefore depend on her response to the new chemotherapy regimen that was just started.  I will discuss her case with Dr. Manley at our office tomorrow.        Joel Lopez MD  17             Electronically signed by Joel Lopez MD at 2017 12:31 PM      Hemant Perez MD at 2017  7:53 PM  Version 2 of 2                                [facility]  Cardiology Progress Note    Patient Identification:  Name:Yury Mott  Age:56 y.o.  Sex: female  :  1960  MRN: [id number]             Length of Stay: 2    Follow up for:  Pericardial effusion      Interval History :  Discussed with Dr. Bansal. Repeat echo showed moderately large pericardial effusion but his main concern is the complete opacificaiton of the left lung which he thinks is most likely due to metastatic disease with atelectasis rather than fluid. At this time her BP is ok. Overall her prognosis is guarded and she doesn't want to be resuscitated. Her tachycardia is multifactorial and pulmonary issue may be contributing more. Not much JVD elevation and not significant pulsus paradoxus.  We decided to hold any pericardial window today and proceed with pericardiocentesis tomorrow ,  had thoracentesis today    HPI    The following portions of the patient's history were reviewed and updated as appropriate: allergies, current medications, past family history, past medical history, past social history, past surgical history and problem list.  Past Medical History:   Diagnosis Date   • Anemia     intermittent which responded to iron.    • Breast cancer     Left, ER/GA negative, HER-2 positive    • Liver metastasis     biopsy proven    • Metastasis to brain     irradiated on 7/10/2015 w/ focus radiation       Scheduled Meds:    Current Facility-Administered Medications:   •  bisacodyl (DULCOLAX) EC tablet 10 mg, 10 mg, Oral, Daily PRN, Chester Lynne MD  •  fentaNYL (DURAGESIC) 25 MCG/HR patch 1 patch, 1 patch, Transdermal, Q72H, Chester Lynne MD, 1 patch at 06/03/17 0853  •  Glycerin-Hypromellose- (ARTIFICIAL TEARS) 0.2-0.2-1 % ophthalmic solution solution 1 drop, 1 drop, Both Eyes, Q1H PRN, Chester Lynne MD  •  HYDROmorphone (DILAUDID) injection 1 mg, 1 mg, Intravenous, Q2H PRN, Chester Lynne MD, 1 mg at 06/04/17 1855  •  lactulose (CHRONULAC) 10 GM/15ML solution 30 mL, 30 mL, Oral, Once, Dayna Benitez MD  •  ondansetron (ZOFRAN) tablet 8 mg, 8 mg, Oral, Q8H PRN, Chester Lynne MD  •  prochlorperazine (COMPAZINE) tablet 10 mg, 10 mg, Oral, Q6H PRN, Chester Lynne MD  •  promethazine (PHENERGAN) tablet 12.5 mg, 12.5 mg, Oral, Q6H PRN, Chester Lynne MD, 12.5 mg at 06/03/17 0634  •  Scopolamine (TRANSDERM-SCOP) 1.5 MG/3DAYS patch 1 patch, 1 patch, Transdermal, Q72H, Chester Lynne MD, 1 patch at 06/03/17 0853  •  sennosides-docusate sodium (SENOKOT-S) 8.6-50 MG tablet 2 tablet, 2 tablet, Oral, Daily, Chester Lynne MD, 2 tablet at 06/04/17 0802  •  sodium chloride 0.9 % flush 10 mL, 10 mL, Intracatheter, Q12H, Yokasta Luo, APRN, 10 mL at 06/04/17 0900  •  sodium chloride 0.9 % flush 10 mL, 10 mL, Intracatheter, PRN, Yokasta Luo, APRN    All systems were reviewed and  "negative except for:  Respiratory: positive for  shortness of air    /89 (BP Location: Right arm, Patient Position: Sitting)  Pulse 120  Temp 98.2 °F (36.8 °C) (Oral)   Resp 20  Ht 67\" (170.2 cm)  Wt 124 lb (56.2 kg)  SpO2 99%  BMI 19.42 kg/m2       Intake/Output Summary (Last 24 hours) at 06/04/17 1953  Last data filed at 06/04/17 1612   Gross per 24 hour   Intake                0 ml   Output              300 ml   Net             -300 ml          Wt Readings from Last 1 Encounters:   06/04/17 0554 124 lb (56.2 kg)   06/03/17 0537 124 lb (56.2 kg)   06/02/17 1207 123 lb 4.8 oz (55.9 kg)       Physical Examination: By         Physical Exam    General: Mild respiratory distress and short of breath   Skin: Warm and dry, no diaphoresis noted   HEENT: No ptosis;  oral mucosa moist   Neck: Supple; no carotid bruits; no JVD, Trachea mid line   Heart: S1S2  regular rate and rhythm;  no murmurs; no gallop or rub appreciated, No thrills palpable   Chest: Respirations unlabored at rest, bilateral breath sounds have decreased air entry; left side worse, no  wheezes auscultated.     Abdomen: Soft, non-tender, -distended, positive bowel sounds  ,No aneurysms palpable   Extremities: Bilateral lower extremities have no pre-tibial pitting edema; Radials are palpable   Neurological: Alert and oriented ; no new motor deficits,       Lab Review:  Personally reviewed the labs, radiology imaging and other cardiac procedures.           Estimated Creatinine Clearance: 74.4 mL/min (by C-G formula based on Cr of 0.74).    I personally viewed and interpreted the patient's EKG/Telemetry data        Patient Active Problem List   Diagnosis   • Breast cancer metastasized to brain   • Breast cancer metastasized to liver   • Encounter for long-term (current) use of high-risk medication   • Fitting and adjustment of vascular catheter   • Intractable pain   • Nausea and vomiting   • Malignant neoplasm metastatic to left " lung   • Leukocytopenia   • Anemia associated with chemotherapy   • Dyspnea, unspecified   • Hyponatremia syndrome   • Bilateral pleural effusion   • Chemotherapy induced neutropenia   • Malignant neoplasm of other specified sites of female breast   • Cancer-related pain   • Pleuritic chest pain   • Pericardial effusion without cardiac tamponade       Assessment/ Plan :    Moderately large pericardial effusion with pretamponade physiology with probably bilateral lung tumor metastasis. Patient want to be DNR.     Discussed with  and changed to code status to dnr    Bp is stable today and for pericardiocentesis tomorrow, procedure and complications explained.    I not only counseled the patient today on the significant factors noted in the assessment and plan, but I also recommended that the patient reduce salt and saturated animal fat intake in diet, as well as to perform scheduled exercise on a regular basis.    Mg Perez MD, [facility]  6/4/20177:52 PM    Patient personally interviewed and above subjective findings personally confirmed during a face to face contact with patient today, and I personally performed the  physical examination.  All labs, cardiac procedures,pertinent radiographs of the last 24 hours personally reviewed, Impression and plans discussed/elaborated and implemented by me or jointly as described above -----------Mg Perez MD       Electronically signed by eHmant Perez MD at 6/4/2017  7:55 PM           RAZ Giordano at 6/5/2017  8:08 AM  Version 1 of 1    Attestation signed by Asael Arriaga MD at 6/5/2017  1:35 PM        I have reviewed the documentation above and agree.  Right thoracentesis completed just today for 850 cc of bloody fluid from the right pleural space.  Plan to drain left pleural space with Pleurx today.  Attempts to drain the pericardial effusion were abandoned after repeat echo demonstrated a reduction in size of the original pericardial  "effusion.  She is currently not demonstrating any signs of cardiac tamponade physiology.  I recommend continuing with repeat drainage of the left pleural effusion 3 times a week.  Reviewing her CAT scan suggest that she likely has lymphangitic spread with diffuse disease within the left lung.  Her pleural effusion looks loculated on the left.  We will continue to follow along and assist in her care.                                     Chief Complaint: Shortness of breath      Subjective:  Symptoms:  Stable.  No shortness of breath, cough or chest pain.  (Decreased shortness of breath since draining left PleurX and right thoracentesis.  Still having right pleuritic chest pain otherwise feels better.  Went for pericardial drain but she states the fluid had decreased and procedure was cancelled.  ).    Diet:  Adequate intake.  No nausea or vomiting.    Activity level: Normal.        Vital Signs:  Temp:  [97.7 °F (36.5 °C)-98 °F (36.7 °C)] 97.9 °F (36.6 °C)  Heart Rate:  [107-120] 110  Resp:  [18-22] 18  BP: ()/(58-89) 99/70    Intake & Output (last day)       06/04 0701 - 06/05 0700 06/05 0701 - 06/06 0700    I.V. (mL/kg)      Total Intake(mL/kg)      Other 300     Total Output 300      Net -300            Unmeasured Urine Occurrence 1 x           Objective:  General Appearance:  Comfortable, in no acute distress and ill-appearing.    Vital signs: (most recent): Blood pressure 99/70, pulse 110, temperature 97.9 °F (36.6 °C), temperature source Oral, resp. rate 20, height 67\" (170.2 cm), weight 124 lb (56.2 kg), SpO2 96 %.  No fever.    Lungs:  Normal respiratory rate and normal effort.  There are decreased breath sounds.    Heart: Tachycardia.  Regular rhythm.  No murmur.   Abdomen: Abdomen is soft and non-distended.    Extremities: Normal range of motion.  There is no dependent edema.    Neurological: Patient is alert and oriented to person, place and time.    Skin:  Warm and dry.            Results Review:  "    I reviewed the patient's new clinical results.  I reviewed the patient's new imaging results and agree with the interpretation.    Imaging Results (last 24 hours)     Procedure Component Value Units Date/Time    US Thoracentesis Right [[id number]] Collected:  06/04/17 1401     Updated:  06/04/17 1404    Narrative:       HISTORY: Right pleural effusion.     PROCEDURE: Ultrasound guided right thoracentesis     Procedure Note: Informed consent was obtained. Preliminary sonography of  the right hemithorax was performed. A pleural effusion was visualized.  The overlying skin was prepped in the usual sterile fashion. Local  anesthesia was achieved with 1% lidocaine. A small nick was made in the  skin with a scalpel. Through the nick was inserted a needle catheter  device under sonographic guidance. The needle was removed and the  catheter remained and was connected to suction. 850 cc of bloody fluid  was withdrawn. The patient tolerated the procedure without evidence for  complication and left the procedure room in stable condition.       Impression:       1. Technically successful ultrasound guided right thoracentesis.     This report was finalized on 6/4/2017 2:01 PM by Dr. Devon Pastrana MD.       CT Chest With Contrast [[id number]] Collected:  06/03/17 1353     Updated:  06/04/17 1608    Narrative:       EXAMINATION: CT OF THE CHEST WITH CONTRAST     HISTORY: 56-year-old female with history of metastatic breast carcinoma  undergoing possible collapse of the left lung and a pericardial  effusion.     TECHNIQUE: Contiguous axial images were obtained through the chest  following intravenous administration of nonionic contrast.     COMPARISON: Chest x-ray, 06/02/2017.     FINDINGS: There is a moderately large pericardial effusion that measures  on the order of 2.3 cm in thickness. There is a small bore left-sided  chest tube positioned with the tip at the apex of the left pleural  space. A small to moderate-sized left  pleural effusion that is  predominantly subpulmonic in volume remains. There is considerable  consolidation throughout the left lung with only approximately 25% of  the left lung demonstrating aeration. Interstitial opacification and  patchy areas of consolidation are noted throughout this region.     There is a large right pleural effusion. Nodular areas of groundglass  opacification is seen within the right upper lobe, right middle lobe and  right lower lobe. Volume loss at the base of the right lower lobe is  noted.     There is evidence of prior left mastectomy. No evidence for axillary  adenopathy is appreciated. The visualized soft tissue structures of the  upper abdomen have a normal appearance.       Impression:       1. Moderately large pericardial effusion as described.  2. Moderate-sized left pleural effusion with considerable consolidation  and volume loss throughout the left lung. The imaging features suggest a  combination of atelectasis, pneumonia and likely metastatic disease  within the left lung.  3. Large right pleural effusion.  4. Groundglass opacities throughout the right upper lobe and right lower  lobe which may represent pulmonary metastases or an atypical pneumonia,  however pulmonary metastases are favored.     This report was finalized on 6/4/2017 4:05 PM by Dr. Devon Pastrana MD.       XR Chest 1 View [[id number]] Updated:  06/05/17 0608         Assessment/Plan     Principal Problem:    Pericardial effusion without cardiac tamponade  Active Problems:    Breast cancer metastasized to brain    Breast cancer metastasized to liver    Malignant neoplasm metastatic to left lung    Dyspnea, unspecified    Bilateral pleural effusion    Cancer-related pain    Pleuritic chest pain       Assessment:    Condition: In stable condition.  Improving.   (Metastatic breast cancer  Malignant pleural effusion).     Plan:   (Drain left PleurX catheter today and resume schedule to drain on Monday, Wednesday,  Friday and prn for SOA.  She knows to contact our office if PleurX stops draining again.  OK for family member to drain PleurX catheter if needed.  ).       Yokasta Luo, APRN  [facility]  17  8:08 AM             Electronically signed by Asael Arriaga MD at 2017  1:35 PM           Consult Notes (all)      Hemant Perez MD at 2017  2:35 PM  Version 1 of 1     Consult Orders:    1. Inpatient Consult to Cardiology [[id number]] ordered by Chester Lynne MD at 17 1411                                       [facility]  Cardiology Consult Note  .  Patient Identification:  Name: Yury Mott  Age: 56 y.o.  Sex: female  :  1960  MRN: [id number]            Requesting Physician: Dr Lynne    Reason for Consultation: evaluate pericardial effusion       HPI  This is a 56-year-old white female, with no prior cardiac history, with a history for breast cancer, chemotherapy, anemia, who presents for after undergoing routine echocardiogram in our office found to have pericardial effusion.  Dr. Perez spoke with attending and recommendation for admission to this hospital for further evaluation treatment.  Information obtained from medical record, patient.  She states it's been undergoing chemotherapy for approximately 3 years for breast cancer and presented to our office for routine echo.  She states for the past 2 days has had chest pain.  She describes it chest pain as pressure, substernal, pain level 5/10, comes and goes.  Worsens with taking deep breath and position while lying flat makes it worse with shortness of breath.  No associated nausea, diaphoresis, radiation .  Does not take any medicine for it.  No palpitations, dizziness, swelling.  Has unintentional progressive weight loss down by 13 pounds and last 3 weeks or so she thinks.  She is eating.  She is homebound because has no energy other than going for doctor visits.  She has good appetite  "today she says because has not eaten yet.    Cardiac risk factors: No diabetes.  No hypertension.  Unknown cholesterol.  Nonsmoker.  No family history for early coronary artery disease.    States no prior history Yunior told heart attack or undergone any heart procedure, stenting or heart surgery.  No recent fever chills sweats.      All systems were reviewed and negative except for:  Constitution:  positive for fatigue and weight loss  Cardiovascular: positive for  chest pressure / pain, at rest, orthopnea, paroxysmal nocturnal dyspnea and cough  Review of systems:  Pertenant positives are as mentioned in the HPI and all the other    review of systems are negative    Objective:  /79 (BP Location: Right arm, Patient Position: Lying)  Pulse (!) 127  Temp 98.1 °F (36.7 °C) (Oral)   Resp 20  Ht 67\" (170.2 cm)  Wt 123 lb 4.8 oz (55.9 kg)  SpO2 96%  BMI 19.31 kg/m2            Physical Examination: By     Physical Exam      General: In mild respiratroy distress, well developed, well nourished    Skin: Warm and dry, no diaphoresis noted;    no pallor or cyanosis   HEENT: Normal Pupills; no conjunctiva erythema; external ear and nose normal; oral mucosa moist, no xanthelasma, lips not cyanotic   Neck: Supple; no carotid bruits; mild  JVD; thyroid not enlarged. Trachea mid line    Heart: [place] not palpable, S1S2  regular rate and rhythm; no murmurs, no rubs or gallops are auscultated   Chest: Respirations regular, unlabored at rest, left breath sounds have markedly diecreased air entry throughout all lung fields; no crackles,  or wheezes auscultated.     Abdomen: Soft, non-tender, non-distended, positive bowel sounds, no hepatomegaly, No Bruit   Extremities: Bilateral lower extremities have no pre-tibial pitting edema; Radials pulses are palpable   Musculoskeletal:  Gait and station; in bed rest     No clubbing of the fingers   Neurological/  Psychological:  Alert and oriented; no new  motor " "deficits; speech and behavior appropriate;     Rest of the exam is stable     Lab Review:  Personally reviewed the labs, radiology imaging and other cardiac procedures.       Patient Active Problem List   Diagnosis   • Breast cancer metastasized to brain   • Breast cancer metastasized to liver   • Encounter for long-term (current) use of high-risk medication   • Fitting and adjustment of vascular catheter   • Intractable pain   • Nausea and vomiting   • Malignant neoplasm metastatic to left lung   • Leukocytopenia   • Anemia associated with chemotherapy   • Dyspnea, unspecified   • Hyponatremia syndrome   • Bilateral pleural effusion   • Chemotherapy induced neutropenia   • Malignant neoplasm of other specified sites of female breast   • Cancer-related pain          Assessment/ Plan :  Chest pain-×2 days, comes and goes, has very mild chest discomfort now.  No acute distress.  Wearing NC O2.  Will obtain laboratory studies for troponin and EKG.  Obtain labs: ProBNP, TSH, free T4. This is more likely due to pericardial effusion  Takes chemotherapy    Pericardial effusion by echo done in our office today,.  No cardiac tamponade clinically with no pulsus paradoxus. She has mild collapse of RA and RV in late diastole,   tachycardic, mild 103.  No ectopy.  Blood pressure controlled 120/70's.    Cough- \"cancer cough\" worsening in the last week or so.  Looks like left pleurax cathetor is clogged with large left effusion    Obtain lipid profile in a.m.  Further recommendations to follow, discussed with and ongoing management by Dr. SAMARA Perez-Linh Irvin APRN    Will keep NPO and will discuss with Thoracic surgery in AM regarding treatment plan. Due to malignant effusion, pericardial window will be better option    Mg Perez MD,FACC  6/2/2017, 2:36 PM    Patient personally interviewed and above subjective findings personally confirmed during a face to face contact with patient today, and I personally " performed the  physical examination.  All labs, cardiac procedures,pertinent radiographs of the last 24 hours personally reviewed, Impression and plans discussed/elaborated and implemented by me or jointly as described above -----------Mg Perez MD           Electronically signed by Hemant Perez MD at 6/2/2017 11:43 PM      RAZ Giordano at 6/2/2017  3:17 PM  Version 1 of 1     Consult Orders:    1. Inpatient Consult to Cardiothoracic Surgery [[id number]] ordered by Chester Hector MD at 06/02/17 1347           Attestation signed by Asael Arriaga MD at 6/5/2017  1:37 PM        I have reviewed the documentation above and agree.  The patient is well-known to our service with a history of metastatic breast cancer and loculated left pleural effusion following Pleurx catheter placement for management of the dyspnea and shortness of breath.  She currently has a small to moderate-sized pericardial effusion as well as a right pleural effusion.  Discuss the results of her echo with cardiology service and we see no physiological signs of cardiac tamponade.  I recommended percutaneous drainage of the right pleural effusion as well as the pericardial effusion in hopes to avoid any surgical interventions that would significantly set back her recovery from the hospital.  Continue with current drainage of the left and placed Activase if necessary to open up the left Pleurx catheter.  We will follow along.                                     Inpatient Consult to Cardiothoracic Surgery  Consult performed by: LOCO KEN  Consult ordered by: CHESTER HECTOR  Reason for consult: Left PleurX catheter care  Assessment/Recommendations: Activase administered into left PleurX catheter today at 1545.  Going to radiology now for CXR pa and lateral.  Prefer to wait until tomorrow to drain PleurX but may drain today after letting Activase dwell for at least 2 hours if increased left pleural effusion present on  today's CXR.            Patient Care Team:  Ted Manley MD as PCP - General (Hematology and Oncology)  Ted Manley MD as Consulting Physician (Hematology and Oncology)  Steven Barker MD as Referring Physician (Breast Surgery)    Chief Compliant:  Shortness of breath    Subjective     History of Present Illness  Yury Mott known to our service due to recent left PleurX catheter insertion related to malignant pleural effusion which was placed on 5/1/17.  She is a 56-year-old female with a history of metastatic breast cancer that has been treated with several tiers of chemotherapy but has had progressive disease despite this. She has had problems with worsening shortness of breath.  She was being seen in Dr. Perez's office today and underwent echocardiogram which showed pericardial effusion.  She was admitted for further evaluation.  She also has been draining her left PleurX catheter three times per week and the last 2 episodes, she has had only about 10 cc of fluid removed.  Last week she had approximately 300 ml of fluid removed on Monday and it slowly started to decrease to about 150 ml.  Since then, she has had worsening shortness of breath, chest pain, fatigue, and dry cough.  She denies any fever or hemoptysis.      Review of Systems   Constitutional: Positive for activity change, appetite change and fatigue.   HENT: Negative.    Respiratory: Positive for cough and shortness of breath. Negative for wheezing.    Cardiovascular: Positive for chest pain.   Gastrointestinal: Negative.    Endocrine: Negative.    Genitourinary: Negative.    Musculoskeletal: Negative.    Skin: Negative.    Neurological: Negative.    Hematological: Negative.    Psychiatric/Behavioral: Negative.        Vital Signs  Temp:  [98.1 °F (36.7 °C)] 98.1 °F (36.7 °C)  Heart Rate:  [127] 127  Resp:  [20] 20  BP: ()/(64-79) 121/79    Intake & Output (last day)       06/01 0701 - 06/02 0700 06/02 0701 - 06/03 0700           Unmeasured Urine Occurrence  1 x          Physical Exam   Constitutional: She is oriented to person, place, and time. She has a sickly appearance.   HENT:   Head: Normocephalic and atraumatic. Hair is abnormal.   Scarf in place to head   Neck: Normal range of motion. Neck supple.   Cardiovascular: Regular rhythm, normal heart sounds and intact distal pulses.  Tachycardia present.    No murmur heard.  Pulmonary/Chest: Effort normal. She has decreased breath sounds in the left middle field and the left lower field. She has no wheezes. She has no rhonchi. She has no rales. She exhibits no tenderness.   Right lung clear. Frequent non-productive cough noted during exam.     Abdominal: Soft. There is no tenderness.   Musculoskeletal: Normal range of motion. She exhibits no edema or tenderness.   Neurological: She is alert and oriented to person, place, and time. She has normal strength.   Skin: Skin is warm and dry. No rash noted. No cyanosis or erythema.   Psychiatric: She has a normal mood and affect. Her behavior is normal. Judgment normal. Cognition and memory are normal.       Active Problems:    Malignant pleural effusion        Assessment:    Condition: In serious condition.  Unchanged.   (Malignant pleural effusion  Pericardial effusion  History of breast cancer  ).     Plan:   (Due to symptoms of shortness of breath, decreased breath sounds on left side, and recent decrease in pleural fluid drainage from PleurX, I went ahead and administered Activase 2mg into PleurX catheter today around 1545.  She then went down to radiology for CXR PA and lateral.  Plan to have PleurX drained tomorrow if able.  If increased left pleural effusion, may drain PleurX today but would prefer to wait at least 2 hours after Activase.  IV nurse consulted to access Power port.  Cardiology following concerning pericardial effusion.  ).       I discussed the patients findings and our recommendations with patient, family and  nursing staff    Thank you for this consult and allowing us to participate in the care of your patient.  We will follow along with you during this hospitalization.       Yokasta Luo, APRN  06/02/17  4:02 PM           Electronically signed by Asael Arriaga MD at 6/5/2017  1:37 PM

## 2017-06-05 NOTE — PROGRESS NOTES
[facility]  Cardiology Progress Note    Patient Identification:  Name:Yury Mott  Age:56 y.o.  Sex: female  :  1960  MRN: [id number]             Length of Stay: 3    Follow up for:  Pericardial effusion      Interval History :  Discussed with Dr. Bansal. Repeat echo showed moderately large pericardial effusion but his main concern is the complete opacificaiton of the left lung which he thinks is most likely due to metastatic disease with atelectasis rather than fluid. At this time her BP is ok. Overall her prognosis is guarded and she doesn't want to be resuscitated. Her tachycardia is multifactorial and pulmonary issue may be contributing more. Not much JVD elevation and not significant pulsus paradoxus.  We decided to hold any pericardial window today and proceed with pericardiocentesis tomorrow , had thoracentesis     Went for pericardial drain but she states the fluid had decreased and procedure was cancelled      CV surg. Says today: Right thoracentesis yesterday for 850 cc of bloody fluid from the right pleural space. Plan to drain left pleural space with Pleurx today. ( Attempts to drain the pericardial effusion were abandoned after repeat echo demonstrated a reduction in size of the original pericardial effusion. She is currently not demonstrating any signs of cardiac tamponade physiology. I recommend continuing with repeat drainage of the left pleural effusion 3 times a week. Reviewing her CAT scan suggest that she likely has lymphangitic spread with diffuse disease within the left lung. Her pleural effusion looks loculated on the left.       HPI  Hemant Perez MD Physician Signed Cardiology Consults Date of Service: 2017  2:35 PM     Consult Orders:     Inpatient Consult to Cardiology [[id number]] ordered by Chester Lynne MD at 17 1411          Expand All Collapse All    []Hide copied text  [facility]  Cardiology  Consult Note  .  Patient Identification:  Name: Yury Mott  Age: 56 y.o.  Sex: female  : 1960  MRN: [id number]             Requesting Physician: Dr Lynne     Reason for Consultation: evaluate pericardial effusion         HPI  This is a 56-year-old white female, with no prior cardiac history, with a history for breast cancer, chemotherapy, anemia, who presents for after undergoing routine echocardiogram in our office found to have pericardial effusion. Dr. Perez spoke with attending and recommendation for admission to this hospital for further evaluation treatment. Information obtained from medical record, patient. She states it's been undergoing chemotherapy for approximately 3 years for breast cancer and presented to our office for routine echo. She states for the past 2 days has had chest pain. She describes it chest pain as pressure, substernal, pain level 5/10, comes and goes. Worsens with taking deep breath and position while lying flat makes it worse with shortness of breath. No associated nausea, diaphoresis, radiation . Does not take any medicine for it. No palpitations, dizziness, swelling. Has unintentional progressive weight loss down by 13 pounds and last 3 weeks or so she thinks. She is eating. She is homebound because has no energy other than going for doctor visits. She has good appetite today she says because has not eaten yet.     Cardiac risk factors: No diabetes. No hypertension. Unknown cholesterol. Nonsmoker. No family history for early coronary artery disease.     States no prior history Yunior told heart attack or undergone any heart procedure, stenting or heart surgery. No recent fever chills sweats.        Past Medical History:   Medical History         Past Medical History:   Diagnosis Date   • Anemia       intermittent which responded to iron.    • Breast cancer       Left, ER/NE negative, HER-2 positive    • Liver metastasis       biopsy proven    • Metastasis to brain        irradiated on 7/10/2015 w/ focus radiation         Past Surgical History:   Surgical History          Past Surgical History:   Procedure Laterality Date   • MASTECTOMY Left 12/30/2014   • PLEURAL CATHETER INSERTION Left 5/1/2017     Procedure: PLEURX CATHETER INSERTION; Surgeon: Asael Arriaga MD; Location: [facility]; Service:    • PORTACATH PLACEMENT   06/2014         Current Meds:      Current Facility-Administered Medications:   • bisacodyl (DULCOLAX) EC tablet 10 mg, 10 mg, Oral, Daily PRN, Chester Lynne MD  • fentaNYL (DURAGESIC) 25 MCG/HR patch 1 patch, 1 patch, Transdermal, Q72H, Chester Lynne MD  • Glycerin-Hypromellose- (ARTIFICIAL TEARS) 0.2-0.2-1 % ophthalmic solution solution 1 drop, 1 drop, Both Eyes, Q1H PRN, Chester Lynne MD  • ondansetron (ZOFRAN) tablet 8 mg, 8 mg, Oral, Q8H PRN, Chester Lynne MD  • prochlorperazine (COMPAZINE) tablet 10 mg, 10 mg, Oral, Q6H PRN, Chester Lynne MD  • promethazine (PHENERGAN) tablet 12.5 mg, 12.5 mg, Oral, Q6H PRN, Chester Lynne MD  • Scopolamine (TRANSDERM-SCOP) 1.5 MG/3DAYS patch 1 patch, 1 patch, Transdermal, Q72H, Chester Lynne MD  • sennosides-docusate sodium (SENOKOT-S) 8.6-50 MG tablet 2 tablet, 2 tablet, Oral, Daily, Chester Lynne MD     Allergies:  Allergies   Allergen Reactions   • Latex Rash      Social History:        Social History   Substance Use Topics   • Smoking status: Never Smoker   • Smokeless tobacco: Former User   • Alcohol use No      Family History:         Family History   Problem Relation Age of Onset   • Uterine cancer Maternal Grandmother         mid 50's   • Cancer Other     • Other Other         Cardiac disorder   • Cancer Mother 70       Face   • Anemia Mother     • Heart disease Father           All systems were reviewed and negative except for: Constitution: positive for fatigue and weight loss  Cardiovascular: positive for chest pressure / pain, at rest, orthopnea, paroxysmal nocturnal  "dyspnea and cough  Review of systems: P[place] positives are as mentioned in the HPI and all the other review of systems are negative     Objective:  /79 (BP Location: Right arm, Patient Position: Lying)  Pulse (!) 127  Temp 98.1 °F (36.7 °C) (Oral)  Resp 20  Ht 67\" (170.2 cm)  Wt 123 lb 4.8 oz (55.9 kg)  SpO2 96%  BMI 19.31 kg/m2              Physical Examination: By      Physical Exam        General: In mild respiratroy distress, well developed, well nourished    Skin: Warm and dry, no diaphoresis noted;   no pallor or cyanosis   HEENT: Normal Pupills; no conjunctiva erythema; external ear and nose normal; oral mucosa moist, no xanthelasma, lips not cyanotic   Neck: Supple; no carotid bruits; mild JVD; thyroid not enlarged. Trachea mid line    Heart: [place] not palpable, S1S2 regular rate and rhythm; no murmurs, no rubs or gallops are auscultated   Chest: Respirations regular, unlabored at rest, left breath sounds have markedly diecreased air entry throughout all lung fields; no crackles, or wheezes auscultated.    Abdomen: Soft, non-tender, non-distended, positive bowel sounds, no hepatomegaly, No Bruit   Extremities: Bilateral lower extremities have no pre-tibial pitting edema; Radials pulses are palpable   Musculoskeletal:  Gait and station; in bed rest   No clubbing of the fingers   Neurological/  Psychological:  Alert and oriented; no new motor deficits; speech and behavior appropriate;    Rest of the exam is stable      Lab Review: Personally reviewed the labs, radiology imaging and other cardiac procedures.        Results from last 7 days  Lab Units 05/30/17  0848   SODIUM mmol/L 136   POTASSIUM mmol/L 4.1   CHLORIDE mmol/L 95*   TOTAL CO2 mmol/L 29.9*   BUN mg/dL 19   CREATININE mg/dL 0.68   CALCIUM mg/dL 8.8   BILIRUBIN mg/dL 0.6   ALK PHOS U/L 186*   ALT (SGPT) U/L 55*   AST (SGOT) U/L 39*   GLUCOSE mg/dL 124         @LABRCNTbnp@     Results from last 7 days  Lab Units " "05/30/17  0848   WBC 10*3/mm3 6.17   HEMOGLOBIN g/dL 11.0*   HEMATOCRIT % 35.2   PLATELETS 10*3/mm3 398*            @LABRCNTIP(chol,trig,hdl,ldl)     I personally viewed and interpreted the patient's EKG/Telemetry data     ECG: ordered  Echocardiogram: Done at [facility] office today, report not available     Past echo 2/28/2017      Interpretation Summary       · Left ventricular function is normal. Calculated EF = 64%. Estimated EF was in agreement with the calculated EF. Normal left ventricular cavity size and wall thickness noted. All left ventricular wall segments contract normally.  · Left ventricular diastolic function is normal.  · There is a trivial pericardial effusion adjacent to the right ventricle.  · No significant valvular stenosis or insufficiency.         XRAY:ordered   )      Imaging Results (last 24 hours)      ** No results found for the last 24 hours. **                     Patient Active Problem List   Diagnosis   • Breast cancer metastasized to brain   • Breast cancer metastasized to liver   • Encounter for long-term (current) use of high-risk medication   • Fitting and adjustment of vascular catheter   • Intractable pain   • Nausea and vomiting   • Malignant neoplasm metastatic to left lung   • Leukocytopenia   • Anemia associated with chemotherapy   • Dyspnea, unspecified   • Hyponatremia syndrome   • Bilateral pleural effusion   • Chemotherapy induced neutropenia   • Malignant neoplasm of other specified sites of female breast   • Cancer-related pain             Assessment/ Plan :  Chest pain-denies This is more likely due to pleural effusion  Takes chemotherapy     Pericardial effusion by repeat limited echo done, improved reported by the patient and cancelled pericasrdial centesis today     Cough- \"cancer cough\"- better. Looks like left pleurax cathetor is clogged with large left effusion     Ongoing recommendations to follow, discussed with and ongoing management by Dr. SMAARA Holman " Brandee Perez MD,[facility]  6/2/2017, 2:36 PM     Patient personally interviewed and above subjective findings personally confirmed during a face to face contact with patient today, and I personally performed the physical examination.  All labs, cardiac procedures,pertinent radiographs of the last 24 hours personally reviewed, Impression and plans discussed/elaborated and implemented by me or jointly as described above -----------Mg Perez MD                   Revision History       Date/Time User Provider Type Action     6/2/2017 11:43 PM Hemant Perez MD Physician Sign     6/2/2017  2:55 PM RAZ Julio Nurse Practitioner Share     View Details Report          Routing History       Date/Time From To Method     6/2/2017 11:43 PM MD Chester Fonseca MD In Basket                     The following portions of the patient's history were reviewed and updated as appropriate: allergies, current medications, past family history, past medical history, past social history, past surgical history and problem list.  Past Medical History:   Diagnosis Date   • Anemia     intermittent which responded to iron.    • Breast cancer     Left, ER/[place] negative, HER-2 positive    • Liver metastasis     biopsy proven    • Metastasis to brain     irradiated on 7/10/2015 w/ focus radiation       Scheduled Meds:    Current Facility-Administered Medications:   •  bisacodyl (DULCOLAX) EC tablet 10 mg, 10 mg, Oral, Daily PRN, Chester Lynne MD  •  fentaNYL (DURAGESIC) 25 MCG/HR patch 1 patch, 1 patch, Transdermal, Q72H, Chester Lynne MD, 1 patch at 06/03/17 0801  •  Glycerin-Hypromellose- (ARTIFICIAL TEARS) 0.2-0.2-1 % ophthalmic solution solution 1 drop, 1 drop, Both Eyes, Q1H PRN, Chester Lynne MD  •  HYDROmorphone (DILAUDID) injection 1 mg, 1 mg, Intravenous, Q2H PRN, Chester Lynne MD, 1 mg at 06/05/17 1237  •  lactulose (CHRONULAC) 10 GM/15ML solution  "30 mL, 30 mL, Oral, Once, Chester Lynne MD  •  ondansetron (ZOFRAN) tablet 8 mg, 8 mg, Oral, Q8H PRN, Chester Lynne MD  •  prochlorperazine (COMPAZINE) tablet 10 mg, 10 mg, Oral, Q6H PRN, Chester Lynne MD  •  promethazine (PHENERGAN) tablet 12.5 mg, 12.5 mg, Oral, Q6H PRN, Chester Lynne MD, 12.5 mg at 06/03/17 0634  •  Scopolamine (TRANSDERM-SCOP) 1.5 MG/3DAYS patch 1 patch, 1 patch, Transdermal, Q72H, Chester Lynne MD, 1 patch at 06/03/17 0853  •  sennosides-docusate sodium (SENOKOT-S) 8.6-50 MG tablet 2 tablet, 2 tablet, Oral, Daily, Chester Lynne MD, 2 tablet at 06/05/17 1229  •  sodium chloride 0.9 % flush 10 mL, 10 mL, Intracatheter, Q12H, Yokasta N Head, APRN, 10 mL at 06/04/17 2008  •  sodium chloride 0.9 % flush 10 mL, 10 mL, Intracatheter, PRN, Yokasta N Head, APRN    All systems were reviewed and negative except for:  Respiratory: positive for  shortness of air    BP 99/70 (BP Location: Right arm, Patient Position: Sitting)  Pulse 110  Temp 97.7 °F (36.5 °C) (Oral)   Resp 17  Ht 67\" (170.2 cm)  Wt 124 lb (56.2 kg)  SpO2 97%  BMI 19.42 kg/m2       Intake/Output Summary (Last 24 hours) at 06/05/17 1340  Last data filed at 06/05/17 0957   Gross per 24 hour   Intake              240 ml   Output              300 ml   Net              -60 ml          Wt Readings from Last 1 Encounters:   06/05/17 0520 124 lb (56.2 kg)   06/04/17 0554 124 lb (56.2 kg)   06/03/17 0537 124 lb (56.2 kg)   06/02/17 1207 123 lb 4.8 oz (55.9 kg)       Physical Examination: By         Physical Exam    General: Mild respiratory distress and short of breath   Skin: Warm and dry, no diaphoresis noted   HEENT: No ptosis;  oral mucosa moist   Neck: Supple; no carotid bruits; no JVD, Trachea mid line   Heart: S1S2  regular rate and rhythm; tachycardic, soft systolic  murmurs; no gallop or rub appreciated, No thrills palpable   Chest: Respirations unlabored at rest, bilateral breath sounds have decreased air " entry; left side worse, no  wheezes auscultated.     Abdomen: Soft, non-tender, -distended, positive bowel sounds  ,No aneurysms palpable   Extremities: Bilateral lower extremities have no pre-tibial pitting edema; Radials are palpable   Neurological: Alert and oriented ; no new motor deficits,       Lab Review:  Personally reviewed the labs, radiology imaging and other cardiac procedures.       Results from last 7 days  Lab Units 06/05/17  0649  06/02/17  1432   SODIUM mmol/L 134*  < > 135*   POTASSIUM mmol/L 4.0  < > 4.5   CHLORIDE mmol/L 95*  < > 96*   TOTAL CO2 mmol/L 28.3  < > 25.4   BUN mg/dL 18  < > 15   CREATININE mg/dL 0.55*  < > 0.56*   CALCIUM mg/dL 8.8  < > 8.7   BILIRUBIN mg/dL  --   --  0.9   ALK PHOS U/L  --   --  174*   ALT (SGPT) U/L  --   --  49*   AST (SGOT) U/L  --   --  45*   GLUCOSE mg/dL 111*  < > 126*   < > = values in this interval not displayed.    Results from last 7 days  Lab Units 06/02/17  1432   TROPONIN T ng/mL <0.010     @LABRCNTbnp@    Results from last 7 days  Lab Units 06/05/17  0649 06/04/17  0641 06/03/17  0621   WBC 10*3/mm3 5.34 7.36 7.05   HEMOGLOBIN g/dL 11.4* 12.5 11.1*   HEMATOCRIT % 35.9 39.5 34.6*   PLATELETS 10*3/mm3 331 430 389           Estimated Creatinine Clearance: 100.1 mL/min (by C-G formula based on Cr of 0.55).    I personally viewed and interpreted the patient's EKG/Telemetry data        Patient Active Problem List   Diagnosis   • Breast cancer metastasized to brain   • Breast cancer metastasized to liver   • Encounter for long-term (current) use of high-risk medication   • Fitting and adjustment of vascular catheter   • Intractable pain   • Nausea and vomiting   • Malignant neoplasm metastatic to left lung   • Leukocytopenia   • Anemia associated with chemotherapy   • Dyspnea, unspecified   • Hyponatremia syndrome   • Bilateral pleural effusion   • Chemotherapy induced neutropenia   • Malignant neoplasm of other specified sites of female breast   •  Cancer-related pain   • Pleuritic chest pain   • Pericardial effusion without cardiac tamponade       Assessment/ Plan :  Denies cp. Breathing is bettert   Reported improved lessening by echo limited study today done with pt in cath lab reports her  previously moderately large pericardial effusion with pretamponade physiology with probably bilateral lung tumor metastasis.     Bp is stable today and no pericardiocentesis procedure needed at this time.  Cough is better. EKG in am   Patient is a DNR status.     Ongoing management by Dr NIHARIKA Perez-Dennis CRANDALL       I not only counseled the patient today on the significant factors noted in the assessment and plan, but I also recommended that the patient reduce salt and saturated animal fat intake in diet, as well as to perform scheduled exercise on a regular basis.    Mg Perez MD, FACC  6/5/20177:52 PM    Patient personally interviewed and above subjective findings personally confirmed during a face to face contact with patient today, and I personally performed the  physical examination.  All labs, cardiac procedures,pertinent radiographs of the last 24 hours personally reviewed, Impression and plans discussed/elaborated and implemented by me or jointly as described above -----------Mg Perez MD

## 2017-06-05 NOTE — PROGRESS NOTES
Discharge Planning Assessment  Psychiatric     Patient Name: Yury Mott  MRN: [id number]  Today's Date: 6/5/2017    Admit Date: 6/2/2017          Discharge Needs Assessment       06/05/17 1303    Living Environment    Lives With spouse    Living Arrangements house    Home Accessibility bed and bath on same level;stairs to enter home;stairs (1 railing present)    Type of Financial/Environmental Concern none    Transportation Available family or friend will provide    Discharge Needs Assessment    Concerns To Be Addressed no discharge needs identified    Equipment Currently Used at Home wheelchair;oxygen   Home O2 thru [employer]    Discharge Planning Comments S/W pt and her  at bedside.  Facesheet info confirmed.  Pt lives in a house with her .  She is IADLs and manages her own meds.  Her  transports as needed.  Home DME includes a walker, w/c, and home O2 thru [employer].  She is current with [facility].  [facility]/ [facility] notified.  No hx of SNF.              Discharge Plan       06/05/17 1306    Case Management/Social Work Plan    Plan Pt plans to return home with her  and continue with [facility] upon DC.        Discharge Placement     Facility/Agency Request Status Selected? Address Phone Number Fax Number    [facility] Accepted    Yes [address] [address] [phone]        Muriel Bruner RN 6/5/2017 13:07    [facility] is following                           Demographic Summary       06/05/17 1301    Referral Information    Admission Type inpatient    Arrived From home or self-care    Referral Source admission list    Reason For Consult discharge planning    Record Reviewed medical record    Contact Information    Permission Granted to Share Information With family/designee    Primary Care Physician Information    Name Dr. Ted Manley            Functional Status       06/05/17 1302    Functional Status Current    Ambulation  0-->independent    Transferring 0-->independent    Toileting 0-->independent    Bathing 0-->independent    Dressing 0-->independent    Eating 0-->independent    Communication 0-->understands/communicates without difficulty    Swallowing (if score 2 or more for any item, consult Rehab Services) 0-->swallows foods/liquids without difficulty    Functional Status Prior    Ambulation 0-->independent    Transferring 0-->independent    Toileting 0-->independent    Bathing 0-->independent    Dressing 0-->independent    Eating 0-->independent    Communication 0-->understands/communicates without difficulty    Swallowing 0-->swallows foods/liquids without difficulty    IADL    Medications independent    Meal Preparation independent;assistive person    Housekeeping independent;assistive person    Laundry independent;assistive person    Shopping independent;assistive person    Oral Care independent    Cognitive/Perceptual/Developmental    Current Mental Status/Cognitive Functioning no deficits noted             Muriel Bruner, RN

## 2017-06-05 NOTE — PLAN OF CARE
Problem: Patient Care Overview (Adult)  Goal: Plan of Care Review  Outcome: Ongoing (interventions implemented as appropriate)    06/03/17 1548 06/04/17 2008 06/05/17 0445   Coping/Psychosocial Response Interventions   Plan Of Care Reviewed With --  patient;spouse --    Patient Care Overview   Progress no change --  --    Outcome Evaluation   Outcome Summary/Follow up Plan --  --  Patients vitals stable. Patient needed pain medication several times for pain. Patient NPO at midnight. Patient slept most of shift. Will continue to monitor.         Problem: Acute Coronary Syndrome (ACS) (Adult)  Goal: Signs and Symptoms of Listed Potential Problems Will be Absent or Manageable (Acute Coronary Syndrome)  Outcome: Ongoing (interventions implemented as appropriate)    Problem: Respiratory Insufficiency (Adult)  Goal: Identify Related Risk Factors and Signs and Symptoms  Outcome: Ongoing (interventions implemented as appropriate)  Goal: Acid/Base Balance  Outcome: Ongoing (interventions implemented as appropriate)  Goal: Effective Ventilation  Outcome: Ongoing (interventions implemented as appropriate)    Problem: Fall Risk (Adult)  Goal: Absence of Falls  Outcome: Ongoing (interventions implemented as appropriate)    Problem: Infection, Risk/Actual (Adult)  Goal: Identify Related Risk Factors and Signs and Symptoms  Outcome: Ongoing (interventions implemented as appropriate)  Goal: Infection Prevention/Resolution  Outcome: Ongoing (interventions implemented as appropriate)

## 2017-06-06 NOTE — NURSING NOTE
"Nursing Chemotherapy Verification    Chemotherapy Regimen:   Treatment Plans     Name Type Plan dates Plan Provider         Active    OP BREAST Vinorelbine / Trastuzumab Q21D; Changed to Abraxane/Herceptin on 5/9/17 ONCOLOGY TREATMENT  4/17/2017 - Present Ted Manley MD                     Current height and weight: 67\" (170.2 cm) 124 lb (56.2 kg)  Calculated BSA from current height and weight (Pedro): 1.71    Relevant Labs    Results from last 7 days  Lab Units 06/06/17  0501 06/05/17  0649 06/04/17  0641   WBC 10*3/mm3 5.81 5.34 7.36   HEMOGLOBIN g/dL 10.3* 11.4* 12.5   HEMATOCRIT % 32.3* 35.9 39.5   PLATELETS 10*3/mm3 277 331 430     Lab Results   Component Value Date    NEUTROABS 3.98 06/06/2017         Results from last 7 days  Lab Units 06/06/17  0501 06/05/17  0649 06/04/17  0641   CREATININE mg/dL 0.46* 0.55* 0.74       CREATININE: 0.46 mg/dL ABNORMAL (06/06/17 0501)  Estimated creatinine clearance: 119.7 mL/min    No results found for: HAV, HEPAIGM, HEPBIGM, HEPBCAB, HBEAG, HEPCAB    Verification attestation:  I have personally reviewed the planned regimen and its administration and dosing. I understand the potential side effects.The patient has been instructed on the regimen, potential side effects, and self care measures; the consent form has been completed. I have confirmed that the appropriate premedication, prehydration, post medication and/or emergency medications are ordered in addition to the chemotherapy.    I have independently verified that the height and weight is current and calculated the BSA. I have verified the doses with the planned regimen and have clarified any deviations with the physician (Dr. Foreman). I have confirmed the route of administration and patient IV access.    Nurse: Tessa Farmer RN  2nd verification Nurse: Yokasta Llamas RN  "

## 2017-06-06 NOTE — PLAN OF CARE
Problem: Patient Care Overview (Adult)  Goal: Plan of Care Review  Outcome: Ongoing (interventions implemented as appropriate)    06/06/17 0650   Coping/Psychosocial Response Interventions   Plan Of Care Reviewed With patient   Patient Care Overview   Progress no change   Outcome Evaluation   Outcome Summary/Follow up Plan patient transferred for chemo administration on 6/6; pain controlled with Dilaudid PRN         Problem: Fall Risk (Adult)  Goal: Absence of Falls  Outcome: Ongoing (interventions implemented as appropriate)    Problem: Infection, Risk/Actual (Adult)  Goal: Infection Prevention/Resolution  Outcome: Ongoing (interventions implemented as appropriate)    Problem: Pain, Acute (Adult)  Goal: Acceptable Pain Control/Comfort Level  Outcome: Ongoing (interventions implemented as appropriate)

## 2017-06-06 NOTE — PLAN OF CARE
Problem: Patient Care Overview (Adult)  Goal: Plan of Care Review  Outcome: Ongoing (interventions implemented as appropriate)    06/06/17 8151   Coping/Psychosocial Response Interventions   Plan Of Care Reviewed With patient   Patient Care Overview   Progress no change   Outcome Evaluation   Outcome Summary/Follow up Plan Pt still having pain on right flank. IV dilaudid given. Chemo given and tolerated. Dressing changed to plurex cath site.        Goal: Adult Individualization and Mutuality  Outcome: Ongoing (interventions implemented as appropriate)  Goal: Discharge Needs Assessment  Outcome: Ongoing (interventions implemented as appropriate)    Problem: Respiratory Insufficiency (Adult)  Goal: Identify Related Risk Factors and Signs and Symptoms  Outcome: Ongoing (interventions implemented as appropriate)  Goal: Acid/Base Balance  Outcome: Ongoing (interventions implemented as appropriate)  Goal: Effective Ventilation  Outcome: Ongoing (interventions implemented as appropriate)    Problem: Fall Risk (Adult)  Goal: Absence of Falls  Outcome: Ongoing (interventions implemented as appropriate)    Problem: Infection, Risk/Actual (Adult)  Goal: Identify Related Risk Factors and Signs and Symptoms  Outcome: Ongoing (interventions implemented as appropriate)  Goal: Infection Prevention/Resolution  Outcome: Ongoing (interventions implemented as appropriate)    Problem: Pain, Acute (Adult)  Goal: Identify Related Risk Factors and Signs and Symptoms  Outcome: Ongoing (interventions implemented as appropriate)  Goal: Acceptable Pain Control/Comfort Level  Outcome: Ongoing (interventions implemented as appropriate)

## 2017-06-06 NOTE — PROGRESS NOTES
Subjective     CHIEF COMPLAINT:     Metastatic breat cancer; pericardial effusion, pleural effusion    Subjective: Had some nausea this am. Still with pain on right side worse with inspiration since her last right sided thoracentesis. Afebrile.       Past Medical History:   Diagnosis Date   • Anemia     intermittent which responded to iron.    • Breast cancer     Left, ER/CT negative, HER-2 positive    • Liver metastasis     biopsy proven    • Metastasis to brain     irradiated on 7/10/2015 w/ focus radiation       Past Surgical History:   Procedure Laterality Date   • MASTECTOMY Left 12/30/2014   • PLEURAL CATHETER INSERTION Left 5/1/2017    Procedure: PLEURX CATHETER INSERTION;  Surgeon: Asael Arriaga MD;  Location: [facility];  Service:    • PORTACATH PLACEMENT  06/2014       SCHEDULED MEDS:    fentaNYL 1 patch Transdermal Q72H   Scopolamine 1 patch Transdermal Q72H   sennosides-docusate sodium 2 tablet Oral Daily   sodium chloride 10 mL Intracatheter Q12H     INFUSIONS:     PRN MEDS:  •  bisacodyl  •  Glycerin-Hypromellose-  •  HYDROmorphone  •  lactulose  •  ondansetron  •  polyethylene glycol  •  prochlorperazine  •  promethazine  •  promethazine  •  sodium chloride    REVIEW OF SYSTEMS:  GENERAL:  weak.   SKIN:  No skin rashes or lesions.  HEME/LYMPH: Mild Anemia.   RESPIRATORY:  Shortness of breath. Cough.   CVS:  Chest pain. Palpitations. No Lower extremity swelling.  GI:  No Abdominal pain. No Nausea. No Vomiting.   MUSCULOSKELETAL:  No Bone pain. No Joint pain. No Joint stiffness.    Objective   VITAL SIGNS:  Temp:  [97.3 °F (36.3 °C)-98.1 °F (36.7 °C)] 97.3 °F (36.3 °C)  Heart Rate:  [108-120] 120  Resp:  [17-18] 18  BP: (102-131)/(60-77) 102/60    Wt Readings from Last 3 Encounters:   06/05/17 124 lb (56.2 kg)   06/02/17 127 lb (57.6 kg)   05/30/17 127 lb 12.8 oz (58 kg)         PHYSICAL EXAMINATION  GENERAL:  Thin female in NAD, comfortable but coughing after deep breaths.   SKIN:  No  skin rashes or lesions. No ecchymosis or petechiae.  HEAD:  Normocephalic.  EYES:  No Jaundice. Pallor.   NECK:  Supple with Good ROM. No Thyromegaly. No Masses.  LYMPHATICS:  No Lymphadenopathy.  CHEST:  Decrease in breath sounds in left lung base with pleurX in place  CARDIAC:  Tachycardic.  ABDOMEN:  Soft. No tenderness. No Hepatomegaly. No Splenomegaly.   EXTREMITIES:  No Edema. No Cyanosis. No Calf tenderness.  NEUROLOGICAL:  No Focal neurological deficits.      RESULT REVIEW:     Results from last 7 days  Lab Units 06/06/17  0501 06/05/17  0649 06/04/17  0641 06/03/17  0621 06/02/17  1432   WBC 10*3/mm3 5.81 5.34 7.36 7.05 7.52   NEUTROS ABS 10*3/mm3 3.98 4.03 5.26  --  6.87   HEMOGLOBIN g/dL 10.3* 11.4* 12.5 11.1* 11.3*   HEMATOCRIT % 32.3* 35.9 39.5 34.6* 35.0*   PLATELETS 10*3/mm3 277 331 430 389 354       Results from last 7 days  Lab Units 06/06/17  0501 06/05/17  0649 06/04/17  0641  06/02/17  1432   SODIUM mmol/L 134* 134* 132*  < > 135*   POTASSIUM mmol/L 4.2 4.0 4.4  < > 4.5   CHLORIDE mmol/L 96* 95* 92*  < > 96*   TOTAL CO2 mmol/L 31.4* 28.3 26.8  < > 25.4   BUN mg/dL 17 18 20  < > 15   CREATININE mg/dL 0.46* 0.55* 0.74  < > 0.56*   CALCIUM mg/dL 8.9 8.8 8.9  < > 8.7   ALBUMIN g/dL 2.60*  --   --   --  2.80*   BILIRUBIN mg/dL 0.5  --   --   --  0.9   ALK PHOS U/L 137*  --   --   --  174*   ALT (SGPT) U/L 37*  --   --   --  49*   AST (SGOT) U/L 36*  --   --   --  45*   < > = values in this interval not displayed.      CXR:  IMPRESSION:  Slightly less dense opacification of the left hemithorax.  Otherwise, no significant change.    Assessment/Plan   1.  Metastatic HER-2 positive breast cancer to brain, liver, bone and chest wall.  She progressed on recent therapy with Tykerb with Xeloda and she was intolerant to Navelbine with Herceptin.  She was seen by Dr. Manley at our office on 5/30/17 and her treatment was switched to Abraxane and Herceptin.   * Due for day 8 Abraxane today. Discussion with   [facility] and given aggressive malignancy and multiple cancer related symptoms, we will proceed with planned day 8 chemo today.    2.  Pleural effusion.  Right thoracentesis 6/4/17; left pleurX drainage planned 3x/wk.  Activase was instilled into PleurX catheter on 6/2/17.       3.  Pericardial effusion. She was seen by Dr. Perez on 6/2/17 and had an ECHO done revealing a large pericardial effusion. She does not have cardiac tamponade.  Repeat echocardiogram showed improvement, likely related to benefit from chemotherapy. Plan repeat echo in coming days    4.  History of neutropenia due to chemotherapy. Labs good today. Will monitor.   5.  Severe right chest pain after thoracocentesis. CXR without PTX. Prn pain medications  6. Nausea. Prn medications        Poly Foreman MD  06/06/17

## 2017-06-06 NOTE — PROGRESS NOTES
"DAILY PROGRESS NOTE  [facility], [facility]      Patient Identification:  Name: Yury Mott  Age: 56 y.o.  Sex: female  :  1960  MRN: [id number]         Primary Care Physician: Ted Manley MD    Subjective:  Interval History:  Ms. Mott is sitting up in bed eating breakfast this morning. She is having a \"little\" nausea and tenderness at the site of previous thoracentesis. She reports no vomiting or diarrhea. She believes her breathing continues to be less difficult since the thoracentesis. She is to have chemotherapy today.     Objective:    Scheduled Meds:  fentaNYL 1 patch Transdermal Q72H   Scopolamine 1 patch Transdermal Q72H   sennosides-docusate sodium 2 tablet Oral Daily   sodium chloride 10 mL Intracatheter Q12H     Continuous Infusions:     Vital signs in last 24 hours:  Temp:  [97.3 °F (36.3 °C)-98.1 °F (36.7 °C)] 97.3 °F (36.3 °C)  Heart Rate:  [108-120] 120  Resp:  [17-18] 18  BP: (102-131)/(60-77) 102/60    tMax 24 hrs: Temp (24hrs), Av.6 °F (36.4 °C), Min:97.3 °F (36.3 °C), Max:98.1 °F (36.7 °C)      Intake/Output:    Intake/Output Summary (Last 24 hours) at 17 1005  Last data filed at 17 0907   Gross per 24 hour   Intake              480 ml   Output               50 ml   Net              430 ml       Exam:    /60 (BP Location: Right arm, Patient Position: Lying)  Pulse 120  Temp 97.3 °F (36.3 °C) (Oral)   Resp 18  Ht 67\" (170.2 cm)  Wt 124 lb (56.2 kg)  SpO2 98%  BMI 19.42 kg/m2    General Appearance: Alert, cooperative, no distress, AAOx3  Neck: Supple, symmetrical, trachea midline, no JVD  Lungs: Diminished breath sounds bilaterally(L>R), respirations unlabored  Chest Wall: No tenderness or deformity. Port on R side  Heart: Regular rate and rhythm, S1 and S2 normal, no murmur,rub or gallop  Abdomen: Soft, non-tender, bowel sounds active, no masses, no organomegaly   Extremities: Extremities normal, atraumatic, no " cyanosis or edema  Pulses: Pulses palpable in all extremities  Skin: Skin is warm and dry, no rashes or palpable lesions  Neurologic: CNII-XII intact, motor strength grossly intact, no focal deficits noted       Data Review:      Results from last 7 days  Lab Units 06/06/17  0501 06/05/17  0649 06/04/17  0641   WBC 10*3/mm3 5.81 5.34 7.36   HEMOGLOBIN g/dL 10.3* 11.4* 12.5   HEMATOCRIT % 32.3* 35.9 39.5   PLATELETS 10*3/mm3 277 331 430         Results from last 7 days  Lab Units 06/06/17  0501 06/05/17  0649 06/04/17  0641  06/02/17  1432   SODIUM mmol/L 134* 134* 132*  < > 135*   POTASSIUM mmol/L 4.2 4.0 4.4  < > 4.5   CHLORIDE mmol/L 96* 95* 92*  < > 96*   TOTAL CO2 mmol/L 31.4* 28.3 26.8  < > 25.4   BUN mg/dL 17 18 20  < > 15   CREATININE mg/dL 0.46* 0.55* 0.74  < > 0.56*   CALCIUM mg/dL 8.9 8.8 8.9  < > 8.7   BILIRUBIN mg/dL 0.5  --   --   --  0.9   ALK PHOS U/L 137*  --   --   --  174*   ALT (SGPT) U/L 37*  --   --   --  49*   AST (SGOT) U/L 36*  --   --   --  45*   GLUCOSE mg/dL 88 111* 99  < > 126*   < > = values in this interval not displayed.          0  Lab Value Date/Time   TROPONINT <0.010 06/02/2017 1432       Microbiology Results (last 10 days)     ** No results found for the last 240 hours. **           Imaging Results (last 7 days)     Procedure Component Value Units Date/Time    XR Chest PA & Lateral [[id number]] Collected:  06/02/17 1613     Updated:  06/02/17 1622    Narrative:       PA AND LATERAL CHEST     CLINICAL HISTORY: Metastatic breast carcinoma. Malignant effusion.  Dyspnea.     Compared to the previous portable chest x-ray dated 05/01/2017.     There is a large left pleural effusion producing near complete  compressive atelectasis of the left lung that shows slight interval  increase in size, despite the presence of a Pleurx chest tube. A small  right pleural effusion has also developed in the interval. The right  lung appears slightly better expanded and remains free of  focal  infiltrates. There is atelectasis in the medial aspect of the right lung  base. A Mediport device is in place in satisfactory position in the  right subclavian vein without change.     This report was finalized on 6/2/2017 4:19 PM by Dr. Tyson Thompson MD.       US Thoracentesis Right [[id number]] Collected:  06/04/17 1401     Updated:  06/04/17 1404    Narrative:       HISTORY: Right pleural effusion.     PROCEDURE: Ultrasound guided right thoracentesis     Procedure Note: Informed consent was obtained. Preliminary sonography of  the right hemithorax was performed. A pleural effusion was visualized.  The overlying skin was prepped in the usual sterile fashion. Local  anesthesia was achieved with 1% lidocaine. A small nick was made in the  skin with a scalpel. Through the nick was inserted a needle catheter  device under sonographic guidance. The needle was removed and the  catheter remained and was connected to suction. 850 cc of bloody fluid  was withdrawn. The patient tolerated the procedure without evidence for  complication and left the procedure room in stable condition.       Impression:       1. Technically successful ultrasound guided right thoracentesis.     This report was finalized on 6/4/2017 2:01 PM by Dr. Devon Pastrana MD.       CT Chest With Contrast [[id number]] Collected:  06/03/17 1353     Updated:  06/04/17 1608    Narrative:       EXAMINATION: CT OF THE CHEST WITH CONTRAST     HISTORY: 56-year-old female with history of metastatic breast carcinoma  undergoing possible collapse of the left lung and a pericardial  effusion.     TECHNIQUE: Contiguous axial images were obtained through the chest  following intravenous administration of nonionic contrast.     COMPARISON: Chest x-ray, 06/02/2017.     FINDINGS: There is a moderately large pericardial effusion that measures  on the order of 2.3 cm in thickness. There is a small bore left-sided  chest tube positioned with the tip at the apex of  the left pleural  space. A small to moderate-sized left pleural effusion that is  predominantly subpulmonic in volume remains. There is considerable  consolidation throughout the left lung with only approximately 25% of  the left lung demonstrating aeration. Interstitial opacification and  patchy areas of consolidation are noted throughout this region.     There is a large right pleural effusion. Nodular areas of groundglass  opacification is seen within the right upper lobe, right middle lobe and  right lower lobe. Volume loss at the base of the right lower lobe is  noted.     There is evidence of prior left mastectomy. No evidence for axillary  adenopathy is appreciated. The visualized soft tissue structures of the  upper abdomen have a normal appearance.       Impression:       1. Moderately large pericardial effusion as described.  2. Moderate-sized left pleural effusion with considerable consolidation  and volume loss throughout the left lung. The imaging features suggest a  combination of atelectasis, pneumonia and likely metastatic disease  within the left lung.  3. Large right pleural effusion.  4. Groundglass opacities throughout the right upper lobe and right lower  lobe which may represent pulmonary metastases or an atypical pneumonia,  however pulmonary metastases are favored.     This report was finalized on 6/4/2017 4:05 PM by Dr. Devon Pastrana MD.       XR Chest 1 View [[id number]] Collected:  06/05/17 0853     Updated:  06/05/17 1241    Narrative:       HISTORY: 56-year-old female with metastatic breast cancer, pleural  effusions, shortness of breath, left PleurX catheter, now 1 day status  post right thoracentesis.     PORTABLE AP ERECT CHEST DATED 06/05/2017 AT 0523 HOURS.     FINDINGS: When compared to PA and lateral chest radiographs of  06/02/2017 and CT chest exam of 06/03/2017, there is again subtotal  opacification of the left chest with some modest patchy aeration of left  upper lung  parenchyma present. The left PleurX catheter is again  demonstrated. The right subclavian port catheter remains and terminates  in the distribution of superior vena cava. The cardiac silhouette is  largely obscured but appears likely top normal to mildly enlarged  caliber, compatible with demonstration of pericardial effusion on CT  exam of 06/03/2017. Some trace residual right pleural fluid versus  pleural thickening at the lateral right pleural stripe and right  costophrenic angle remain. Monitoring lead wires are present. Oxygen  cannula tubing is present about the neck and right shoulder. No large  pneumothorax is seen. There may be a 1 mm trace superolateral and apical  right pneumothorax. A tiny air bubble at the left apex projecting  adjacent to the PleurX catheter is noted.     CONCLUSION: Decreased right pleural fluid with minimal if any right  apical pneumothorax. Tiny trace of air lucency at the left apex with  superimposed PleurX catheter noted. There is again subtotal  opacification of the left chest and apparent enlargement of cardiac  silhouette as discussed above.     This report was finalized on 6/5/2017 12:38 PM by Dr. Moe Coon MD.       XR Chest PA & Lateral [[id number]] Collected:  06/05/17 1708     Updated:  06/05/17 1715    Narrative:       XR CHEST PA AND LATERAL-     HISTORY: Female who is 56 years-old,  right sided pain     TECHNIQUE: Frontal and lateral views of the chest     COMPARISON: 06/05/2017     FINDINGS: Stable appearing right chest port, left chest tube. Heart size  is difficult to characterize as a result of obscuration of the left  cardiac margin. Opacification of the left hemithorax appears slightly  less dense. Minimal right pleural effusion is suggested. No  pneumothorax. Otherwise stable.       Impression:       Slightly less dense opacification of the left hemithorax.  Otherwise, no significant change.     This report was finalized on 6/5/2017 5:12 PM by Dr. Graham POE  MD Georgi.               Assessment:      Principal Problem:    Pericardial effusion without cardiac tamponade  Active Problems:    Breast cancer metastasized to brain    Breast cancer metastasized to liver    Malignant neoplasm metastatic to left lung    Dyspnea, unspecified    Bilateral pleural effusion    Cancer-related pain    Pleuritic chest pain      Patient Active Problem List   Diagnosis Code   • Breast cancer metastasized to brain C50.919, C79.31   • Breast cancer metastasized to liver C50.919, C78.7   • Encounter for long-term (current) use of high-risk medication Z79.899   • Fitting and adjustment of vascular catheter Z45.2   • Intractable pain R52   • Nausea and vomiting R11.2   • Malignant neoplasm metastatic to left lung C78.02   • Leukocytopenia D72.819   • Anemia associated with chemotherapy D64.81, T45.1X5A   • Dyspnea, unspecified R06.00   • Hyponatremia syndrome E87.1   • Bilateral pleural effusion J90   • Chemotherapy induced neutropenia D70.1, T45.1X5A   • Malignant neoplasm of other specified sites of female breast C50.919   • Cancer-related pain G89.3   • Pleuritic chest pain R07.81   • Pericardial effusion without cardiac tamponade I31.3       Plan:    Chemotherapy as per oncology  Diet: Regular  Pain, nausea management  Monitor and correct electrolytes  Monitor mental status  DVT/stress ulcer prophylaxis  Labs in am    Chester Lynne MD  6/6/2017  10:05 AM        Much of this encounter note is an electronic transcription/translation of spoken language to printed text. The electronic translation of spoken language may permit erroneous, or at times, nonsensical words or phrases to be inadvertently transcribed; Although I have reviewed the note for such errors, some may still exist

## 2017-06-07 NOTE — PROGRESS NOTES
"DAILY PROGRESS NOTE  [facility], [facility]      Patient Identification:  Name: Yury Mott  Age: 56 y.o.  Sex: female  :  1960  MRN: [id number]         Primary Care Physician: Ted Manley MD    Subjective:  Interval History:    Ms. Mott is sitting up in bed this morning. She denies CP, SOA, N/V/D at the moment. She states the pain in her right side at site of thoracentesis is 'better\". She received chemo yesterday.     This pm she was feeling better and happy because she was going to be dced, however, she developed SOB and left sided chest pain. DC had to be cancelled.    Objective:    Scheduled Meds:    enoxaparin 40 mg Subcutaneous Q24H   fentaNYL 1 patch Transdermal Q72H   metoprolol tartrate 25 mg Oral Q12H   Scopolamine 1 patch Transdermal Q72H   sennosides-docusate sodium 2 tablet Oral Daily   sodium chloride 10 mL Intracatheter Q12H     Continuous Infusions:     Vital signs in last 24 hours:  Temp:  [97.5 °F (36.4 °C)-97.9 °F (36.6 °C)] 97.5 °F (36.4 °C)  Heart Rate:  [] 92  Resp:  [16-24] 24  BP: (100-117)/(61-80) 110/80    tMax 24 hrs: Temp (24hrs), Av.6 °F (36.4 °C), Min:97.5 °F (36.4 °C), Max:97.9 °F (36.6 °C)      Intake/Output:    Intake/Output Summary (Last 24 hours) at 17 1725  Last data filed at 17 1352   Gross per 24 hour   Intake              860 ml   Output              900 ml   Net              -40 ml       Exam:    /80 (BP Location: Right arm, Patient Position: Sitting)  Pulse 92  Temp 97.5 °F (36.4 °C) (Oral)   Resp 24  Ht 67\" (170.2 cm)  Wt 124 lb (56.2 kg)  SpO2 99%  BMI 19.42 kg/m2    General Appearance: Alert, cooperative, no distress, AAOx3  Neck: Supple, symmetrical, trachea midline, no JVD  Lungs: Diminished breath sounds bilaterally(L>R), respirations unlabored  Chest Wall: No tenderness or deformity. Port on R side  Heart: Regular rate and rhythm, S1 and S2 normal, no murmur,rub or " gallop  Abdomen: Soft, non-tender, bowel sounds active, no masses, no organomegaly   Extremities: Extremities normal, atraumatic, no cyanosis or edema  Pulses: Pulses palpable in all extremities  Skin: Skin is warm and dry, no rashes or palpable lesions  Neurologic: CNII-XII intact, motor strength grossly intact, no focal deficits noted       Data Review:      Results from last 7 days  Lab Units 06/07/17  0425 06/06/17  0501 06/05/17  0649   WBC 10*3/mm3 5.58 5.81 5.34   HEMOGLOBIN g/dL 10.6* 10.3* 11.4*   HEMATOCRIT % 32.8* 32.3* 35.9   PLATELETS 10*3/mm3 378 277 331         Results from last 7 days  Lab Units 06/07/17  0425 06/06/17  0501 06/05/17  0649  06/02/17  1432   SODIUM mmol/L 135* 134* 134*  < > 135*   POTASSIUM mmol/L 4.2 4.2 4.0  < > 4.5   CHLORIDE mmol/L 95* 96* 95*  < > 96*   TOTAL CO2 mmol/L 29.6* 31.4* 28.3  < > 25.4   BUN mg/dL 16 17 18  < > 15   CREATININE mg/dL 0.47* 0.46* 0.55*  < > 0.56*   CALCIUM mg/dL 8.5* 8.9 8.8  < > 8.7   BILIRUBIN mg/dL  --  0.5  --   --  0.9   ALK PHOS U/L  --  137*  --   --  174*   ALT (SGPT) U/L  --  37*  --   --  49*   AST (SGOT) U/L  --  36*  --   --  45*   GLUCOSE mg/dL 113* 88 111*  < > 126*   < > = values in this interval not displayed.          0  Lab Value Date/Time   TROPONINT <0.010 06/02/2017 1432       Microbiology Results (last 10 days)     ** No results found for the last 240 hours. **           Imaging Results (last 7 days)     Procedure Component Value Units Date/Time    XR Chest PA & Lateral [[id number]] Collected:  06/02/17 1613     Updated:  06/02/17 1622    Narrative:       PA AND LATERAL CHEST     CLINICAL HISTORY: Metastatic breast carcinoma. Malignant effusion.  Dyspnea.     Compared to the previous portable chest x-ray dated 05/01/2017.     There is a large left pleural effusion producing near complete  compressive atelectasis of the left lung that shows slight interval  increase in size, despite the presence of a Pleurx chest tube. A small  right  pleural effusion has also developed in the interval. The right  lung appears slightly better expanded and remains free of focal  infiltrates. There is atelectasis in the medial aspect of the right lung  base. A Mediport device is in place in satisfactory position in the  right subclavian vein without change.     This report was finalized on 6/2/2017 4:19 PM by Dr. Tyson Thompson MD.       US Thoracentesis Right [[id number]] Collected:  06/04/17 1401     Updated:  06/04/17 1404    Narrative:       HISTORY: Right pleural effusion.     PROCEDURE: Ultrasound guided right thoracentesis     Procedure Note: Informed consent was obtained. Preliminary sonography of  the right hemithorax was performed. A pleural effusion was visualized.  The overlying skin was prepped in the usual sterile fashion. Local  anesthesia was achieved with 1% lidocaine. A small nick was made in the  skin with a scalpel. Through the nick was inserted a needle catheter  device under sonographic guidance. The needle was removed and the  catheter remained and was connected to suction. 850 cc of bloody fluid  was withdrawn. The patient tolerated the procedure without evidence for  complication and left the procedure room in stable condition.       Impression:       1. Technically successful ultrasound guided right thoracentesis.     This report was finalized on 6/4/2017 2:01 PM by Dr. Devon Pastrana MD.       CT Chest With Contrast [[id number]] Collected:  06/03/17 1353     Updated:  06/04/17 1608    Narrative:       EXAMINATION: CT OF THE CHEST WITH CONTRAST     HISTORY: 56-year-old female with history of metastatic breast carcinoma  undergoing possible collapse of the left lung and a pericardial  effusion.     TECHNIQUE: Contiguous axial images were obtained through the chest  following intravenous administration of nonionic contrast.     COMPARISON: Chest x-ray, 06/02/2017.     FINDINGS: There is a moderately large pericardial effusion that  measures  on the order of 2.3 cm in thickness. There is a small bore left-sided  chest tube positioned with the tip at the apex of the left pleural  space. A small to moderate-sized left pleural effusion that is  predominantly subpulmonic in volume remains. There is considerable  consolidation throughout the left lung with only approximately 25% of  the left lung demonstrating aeration. Interstitial opacification and  patchy areas of consolidation are noted throughout this region.     There is a large right pleural effusion. Nodular areas of groundglass  opacification is seen within the right upper lobe, right middle lobe and  right lower lobe. Volume loss at the base of the right lower lobe is  noted.     There is evidence of prior left mastectomy. No evidence for axillary  adenopathy is appreciated. The visualized soft tissue structures of the  upper abdomen have a normal appearance.       Impression:       1. Moderately large pericardial effusion as described.  2. Moderate-sized left pleural effusion with considerable consolidation  and volume loss throughout the left lung. The imaging features suggest a  combination of atelectasis, pneumonia and likely metastatic disease  within the left lung.  3. Large right pleural effusion.  4. Groundglass opacities throughout the right upper lobe and right lower  lobe which may represent pulmonary metastases or an atypical pneumonia,  however pulmonary metastases are favored.     This report was finalized on 6/4/2017 4:05 PM by Dr. Devon Pastrana MD.       XR Chest 1 View [[id number]] Collected:  06/05/17 0853     Updated:  06/05/17 1241    Narrative:       HISTORY: 56-year-old female with metastatic breast cancer, pleural  effusions, shortness of breath, left PleurX catheter, now 1 day status  post right thoracentesis.     PORTABLE AP ERECT CHEST DATED 06/05/2017 AT 0523 HOURS.     FINDINGS: When compared to PA and lateral chest radiographs of  06/02/2017 and CT chest exam  of 06/03/2017, there is again subtotal  opacification of the left chest with some modest patchy aeration of left  upper lung parenchyma present. The left PleurX catheter is again  demonstrated. The right subclavian port catheter remains and terminates  in the distribution of superior vena cava. The cardiac silhouette is  largely obscured but appears likely top normal to mildly enlarged  caliber, compatible with demonstration of pericardial effusion on CT  exam of 06/03/2017. Some trace residual right pleural fluid versus  pleural thickening at the lateral right pleural stripe and right  costophrenic angle remain. Monitoring lead wires are present. Oxygen  cannula tubing is present about the neck and right shoulder. No large  pneumothorax is seen. There may be a 1 mm trace superolateral and apical  right pneumothorax. A tiny air bubble at the left apex projecting  adjacent to the PleurX catheter is noted.     CONCLUSION: Decreased right pleural fluid with minimal if any right  apical pneumothorax. Tiny trace of air lucency at the left apex with  superimposed PleurX catheter noted. There is again subtotal  opacification of the left chest and apparent enlargement of cardiac  silhouette as discussed above.     This report was finalized on 6/5/2017 12:38 PM by Dr. Moe Coon MD.       XR Chest PA & Lateral [[id number]] Collected:  06/05/17 1708     Updated:  06/05/17 1715    Narrative:       XR CHEST PA AND LATERAL-     HISTORY: Female who is 56 years-old,  right sided pain     TECHNIQUE: Frontal and lateral views of the chest     COMPARISON: 06/05/2017     FINDINGS: Stable appearing right chest port, left chest tube. Heart size  is difficult to characterize as a result of obscuration of the left  cardiac margin. Opacification of the left hemithorax appears slightly  less dense. Minimal right pleural effusion is suggested. No  pneumothorax. Otherwise stable.       Impression:       Slightly less dense opacification of  the left hemithorax.  Otherwise, no significant change.     This report was finalized on 6/5/2017 5:12 PM by Dr. Graham Laureano MD.               Assessment:      Principal Problem:    Pericardial effusion without cardiac tamponade  Active Problems:    Breast cancer metastasized to brain    Breast cancer metastasized to liver    Dyspnea, unspecified    Bilateral pleural effusion    Cancer-related pain    Pleuritic chest pain    Malignant neoplasm metastatic to left lung      Patient Active Problem List   Diagnosis Code   • Breast cancer metastasized to brain C50.919, C79.31   • Breast cancer metastasized to liver C50.919, C78.7   • Encounter for long-term (current) use of high-risk medication Z79.899   • Fitting and adjustment of vascular catheter Z45.2   • Intractable pain R52   • Nausea and vomiting R11.2   • Malignant neoplasm metastatic to left lung C78.02   • Leukocytopenia D72.819   • Anemia associated with chemotherapy D64.81, T45.1X5A   • Dyspnea, unspecified R06.00   • Hyponatremia syndrome E87.1   • Bilateral pleural effusion J90   • Chemotherapy induced neutropenia D70.1, T45.1X5A   • Malignant neoplasm of other specified sites of female breast C50.919   • Cancer-related pain G89.3   • Pleuritic chest pain R07.81   • Pericardial effusion without cardiac tamponade I31.3       Plan:    Cancel dc  Will recheck CXR, check EKG and troponin  Adjust pain and nausea medications  Diet: reg  Monitor and correct electrolytes  Monitor mental status  DVT/stress ulcer prophylaxis  Labs in am          Chester Lynne MD  6/7/2017  5:25 PM        Much of this encounter note is an electronic transcription/translation of spoken language to printed text. The electronic translation of spoken language may permit erroneous, or at times, nonsensical words or phrases to be inadvertently transcribed; Although I have reviewed the note for such errors, some may still exist

## 2017-06-07 NOTE — PROGRESS NOTES
"    Chief Complaint: Shortness of breath    Subjective:  Symptoms:  (Patient feels better today with decreased SOA.  Getting ready to go to cardiology for repeat echo for evaluation of pericardial effusion.  She states her left PleurX catheter drained 300 ml on Saturday, 150 ml Monday, and 100 ml today.  ).        Vital Signs:  Temp:  [97.5 °F (36.4 °C)-97.9 °F (36.6 °C)] 97.5 °F (36.4 °C)  Heart Rate:  [] 113  Resp:  [16-18] 16  BP: (100-118)/(61-72) 100/61    Intake & Output (last day)       06/06 0701 - 06/07 0700 06/07 0701 - 06/08 0700    P.O. 940 360    Total Intake(mL/kg) 940 (16.7) 360 (6.4)    Urine (mL/kg/hr) 900 (0.7)     Other      Total Output 900      Net +40 +360          Unmeasured Urine Occurrence 1 x     Unmeasured Stool Occurrence 1 x           Objective:  General Appearance:  Ill-appearing and in no acute distress.    Vital signs: (most recent): Blood pressure 100/61, pulse 113, temperature 97.5 °F (36.4 °C), temperature source Oral, resp. rate 16, height 67\" (170.2 cm), weight 124 lb (56.2 kg), SpO2 99 %.  No fever.    Lungs:  Normal respiratory rate and normal effort.    Heart: Tachycardia.    Neurological: Patient is alert and oriented to person, place and time.              Results Review:     I reviewed the patient's new clinical results.    Imaging Results (last 24 hours)     ** No results found for the last 24 hours. **          Lab Results:     Lab Results (last 24 hours)     Procedure Component Value Units Date/Time    CBC & Differential [[id number]] Collected:  06/07/17 0425    Specimen:  Blood Updated:  06/07/17 0616    Narrative:       The following orders were created for panel order CBC & Differential.  Procedure                               Abnormality         Status                     ---------                               -----------         ------                     CBC Auto Differential[[id number]]        Abnormal            Final result                 Please view " results for these tests on the individual orders.    CBC Auto Differential [[id number]]  (Abnormal) Collected:  06/07/17 0425    Specimen:  Blood Updated:  06/07/17 0616     WBC 5.58 10*3/mm3      RBC 3.63 (L) 10*6/mm3      Hemoglobin 10.6 (L) g/dL      Hematocrit 32.8 (L) %      MCV 90.4 fL      MCH 29.2 pg      MCHC 32.3 (L) g/dL      RDW 16.5 (H) %      RDW-SD 55.1 (H) fl      MPV 9.6 fL      Platelets 378 10*3/mm3      Neutrophil % 79.0 (H) %      Lymphocyte % 16.5 (L) %      Monocyte % 4.1 (L) %      Eosinophil % 0.0 (L) %      Basophil % 0.0 %      Immature Grans % 0.4 %      Neutrophils, Absolute 4.41 10*3/mm3      Lymphocytes, Absolute 0.92 10*3/mm3      Monocytes, Absolute 0.23 10*3/mm3      Eosinophils, Absolute 0.00 10*3/mm3      Basophils, Absolute 0.00 10*3/mm3      Immature Grans, Absolute 0.02 10*3/mm3     Basic Metabolic Panel [[id number]]  (Abnormal) Collected:  06/07/17 0425    Specimen:  Blood Updated:  06/07/17 0628     Glucose 113 (H) mg/dL      BUN 16 mg/dL      Creatinine 0.47 (L) mg/dL      Sodium 135 (L) mmol/L      Potassium 4.2 mmol/L      Chloride 95 (L) mmol/L      CO2 29.6 (H) mmol/L      Calcium 8.5 (L) mg/dL      eGFR Non African Amer 137 mL/min/1.73      BUN/Creatinine Ratio 34.0 (H)     Anion Gap 10.4 mmol/L     Narrative:       GFR Normal >60  Chronic Kidney Disease <60  Kidney Failure <15           Assessment/Plan     Principal Problem:    Pericardial effusion without cardiac tamponade  Active Problems:    Breast cancer metastasized to brain    Breast cancer metastasized to liver    Malignant neoplasm metastatic to left lung    Dyspnea, unspecified    Bilateral pleural effusion    Cancer-related pain    Pleuritic chest pain       Assessment:  (Malignant pleural effusion  History of breast cancer  ).     Plan:   (Instructed to continue to drain left PleurX catheter on Monday, Wednesday, Friday and prn for SOA.  Instructed if the drainage should stop again, to contact our office.  She  may need a CT chest for further evaluation at that time.  OK for discharge per thoracic surgery.  ).       Yokasta Luo, APRN  [facility]  06/07/17  1:59 PM

## 2017-06-07 NOTE — PROGRESS NOTES
Repeat Echo showed much reduced the size of the pericardial effusion. Now it is only small size in size.     Hemant Perez MD

## 2017-06-07 NOTE — PROGRESS NOTES
Adult Nutrition  Assessment/PES    Patient Name:  Yury Mott  YOB: 1960  MRN: [id number]  Admit Date:  6/2/2017    Assessment Date:  6/7/2017     Follow up- spoke with patient=stated good appetite, has been drinking 1 ensure a day; snacks were ordered but patient hasn't been getting-will follow up with CA; recommend patient to drink 2 ensure/day and small, frequent meals throughout the day; RD to monitor and follow           Reason for Assessment       06/07/17 1153    Reason for Assessment    Reason For Assessment/Visit follow up protocol                  Labs/Tests/Procedures/Meds       06/07/17 1153    Labs/Tests/Procedures/Meds    Diagnostic Test/Procedure Review reviewed    Labs/Tests Review Reviewed;Glucose;Na+    Medication Review Reviewed, pertinent    Significant Vitals reviewed                Nutrition Prescription Ordered       06/07/17 1153    Nutrition Prescription PO    Current PO Diet Regular    Supplement Ensure    Supplement Frequency 2 times a day            Evaluation of Received Nutrient/Fluid Intake       06/07/17 1154    PO Evaluation    Number of Meals 5    % PO Intake 85              Problem/Interventions:                  Intervention Goal       06/07/17 1154    Intervention Goal    General Maintain nutrition    PO Tolerate PO;Increase intake    Weight Appropriate weight gain            Nutrition Intervention       06/07/17 1154    Nutrition Intervention    RD/Tech Action Care plan reviewd;Interview for preference;Follow Tx progress;Encourage intake              Education/Evaluation       06/07/17 1154    Education    Education Will Instruct as appropriate    Monitor/Evaluation    Monitor Per protocol;PO intake;Supplement intake;Pertinent labs;Weight;Symptoms;Skin status    Education Follow-up Reinforce PRN          Electronically signed by:  Stephanie Stark RD  06/07/17 11:55 AM

## 2017-06-07 NOTE — DISCHARGE INSTRUCTIONS
Monitor right forearm for redness, swelling, and  drainage. Call primary care physician if any of these occur. Call MD for fever of 100.5 or above.

## 2017-06-07 NOTE — PROGRESS NOTES
[facility]  Cardiology Progress Note    Patient Identification:  Name:Yury Mott  Age:56 y.o.  Sex: female  :  1960  MRN: [id number]             Length of Stay: 4    Follow up for:  Pericardial effusion      Interval History :  Got second dose of chemotherapy and overall doing ok. No angina. ECG has been stable, Bp is stable    I will repeat the echo and if it is stable, can DC home tomorrow    CV surg. : Right thoracentesis yesterday for 850 cc of bloody fluid from the right pleural space. Plan to drain left pleural space with Pleurx today. ( Attempts to drain the pericardial effusion were abandoned after repeat echo demonstrated a reduction in size of the original pericardial effusion. She is currently not demonstrating any signs of cardiac tamponade physiology. I recommend continuing with repeat drainage of the left pleural effusion 3 times a week. Reviewing her CAT scan suggest that she likely has lymphangitic spread with diffuse disease within the left lung. Her pleural effusion looks loculated on the left.       HPI  Hemant Perez MD Physician Signed Cardiology Consults Date of Service: 2017  2:35 PM     Consult Orders:     Inpatient Consult to Cardiology [[id number]] ordered by Chester Lynne MD at 17 1411          Expand All Collapse All    []Hide copied text  [facility]  Cardiology Consult Note  .  Patient Identification:  Name: Yury Mott  Age: 56 y.o.  Sex: female  : 1960  MRN: [id number]             Requesting Physician: Dr Lynne     Reason for Consultation: evaluate pericardial effusion         HPI  This is a 56-year-old white female, with no prior cardiac history, with a history for breast cancer, chemotherapy, anemia, who presents for after undergoing routine echocardiogram in our office found to have pericardial effusion. Dr. Perez spoke with attending and recommendation for admission to this  hospital for further evaluation treatment. Information obtained from medical record, patient. She states it's been undergoing chemotherapy for approximately 3 years for breast cancer and presented to our office for routine echo. She states for the past 2 days has had chest pain. She describes it chest pain as pressure, substernal, pain level 5/10, comes and goes. Worsens with taking deep breath and position while lying flat makes it worse with shortness of breath. No associated nausea, diaphoresis, radiation . Does not take any medicine for it. No palpitations, dizziness, swelling. Has unintentional progressive weight loss down by 13 pounds and last 3 weeks or so she thinks. She is eating. She is homebound because has no energy other than going for doctor visits. She has good appetite today she says because has not eaten yet.     Cardiac risk factors: No diabetes. No hypertension. Unknown cholesterol. Nonsmoker. No family history for early coronary artery disease.     States no prior history Yunior told heart attack or undergone any heart procedure, stenting or heart surgery. No recent fever chills sweats.        Past Medical History:   Medical History         Past Medical History:   Diagnosis Date   • Anemia       intermittent which responded to iron.    • Breast cancer       Left, ER/LA negative, HER-2 positive    • Liver metastasis       biopsy proven    • Metastasis to brain       irradiated on 7/10/2015 w/ focus radiation         Past Surgical History:   Surgical History          Past Surgical History:   Procedure Laterality Date   • MASTECTOMY Left 12/30/2014   • PLEURAL CATHETER INSERTION Left 5/1/2017     Procedure: PLEURX CATHETER INSERTION; Surgeon: Asael Arriaga MD; Location: [facility]; Service:    • PORTACATH PLACEMENT   06/2014         Current Meds:      Current Facility-Administered Medications:   • bisacodyl (DULCOLAX) EC tablet 10 mg, 10 mg, Oral, Daily PRN, Chester Lynne MD  • fentaNYL  "(DURAGESIC) 25 MCG/HR patch 1 patch, 1 patch, Transdermal, Q72H, Chester Lynne MD  • Glycerin-Hypromellose- (ARTIFICIAL TEARS) 0.2-0.2-1 % ophthalmic solution solution 1 drop, 1 drop, Both Eyes, Q1H PRN, Chester Lynne MD  • ondansetron (ZOFRAN) tablet 8 mg, 8 mg, Oral, Q8H PRN, Chester Lynne MD  • prochlorperazine (COMPAZINE) tablet 10 mg, 10 mg, Oral, Q6H PRN, Chester Lynne MD  • promethazine (PHENERGAN) tablet 12.5 mg, 12.5 mg, Oral, Q6H PRN, Chester Lynne MD  • Scopolamine (TRANSDERM-SCOP) 1.5 MG/3DAYS patch 1 patch, 1 patch, Transdermal, Q72H, Chester Lynne MD  • sennosides-docusate sodium (SENOKOT-S) 8.6-50 MG tablet 2 tablet, 2 tablet, Oral, Daily, Chester Lynne MD     Allergies:       Allergies   Allergen Reactions   • Latex Rash      Social History:        Social History   Substance Use Topics   • Smoking status: Never Smoker   • Smokeless tobacco: Former User   • Alcohol use No      Family History:         Family History   Problem Relation Age of Onset   • Uterine cancer Maternal Grandmother         mid 50's   • Cancer Other     • Other Other         Cardiac disorder   • Cancer Mother 70       Face   • Anemia Mother     • Heart disease Father           All systems were reviewed and negative except for: Constitution: positive for fatigue and weight loss  Cardiovascular: positive for chest pressure / pain, at rest, orthopnea, paroxysmal nocturnal dyspnea and cough  Review of systems: Pertenant positives are as mentioned in the HPI and all the other review of systems are negative     Objective:  /79 (BP Location: Right arm, Patient Position: Lying)  Pulse (!) 127  Temp 98.1 °F (36.7 °C) (Oral)  Resp 20  Ht 67\" (170.2 cm)  Wt 123 lb 4.8 oz (55.9 kg)  SpO2 96%  BMI 19.31 kg/m2              Physical Examination: By      Physical Exam        General: In mild respiratroy distress, well developed, well nourished    Skin: Warm and dry, no diaphoresis noted;   no " pallor or cyanosis   HEENT: Normal Pupills; no conjunctiva erythema; external ear and nose normal; oral mucosa moist, no xanthelasma, lips not cyanotic   Neck: Supple; no carotid bruits; mild JVD; thyroid not enlarged. Trachea mid line    Heart: [place] not palpable, S1S2 regular rate and rhythm; no murmurs, no rubs or gallops are auscultated   Chest: Respirations regular, unlabored at rest, left breath sounds have markedly diecreased air entry throughout all lung fields; no crackles, or wheezes auscultated.    Abdomen: Soft, non-tender, non-distended, positive bowel sounds, no hepatomegaly, No Bruit   Extremities: Bilateral lower extremities have no pre-tibial pitting edema; Radials pulses are palpable   Musculoskeletal:  Gait and station; in bed rest   No clubbing of the fingers   Neurological/  Psychological:  Alert and oriented; no new motor deficits; speech and behavior appropriate;    Rest of the exam is stable      Lab Review: Personally reviewed the labs, radiology imaging and other cardiac procedures.        Results from last 7 days  Lab Units 05/30/17  0848   SODIUM mmol/L 136   POTASSIUM mmol/L 4.1   CHLORIDE mmol/L 95*   TOTAL CO2 mmol/L 29.9*   BUN mg/dL 19   CREATININE mg/dL 0.68   CALCIUM mg/dL 8.8   BILIRUBIN mg/dL 0.6   ALK PHOS U/L 186*   ALT (SGPT) U/L 55*   AST (SGOT) U/L 39*   GLUCOSE mg/dL 124         @LABRCNTbnp@     Results from last 7 days  Lab Units 05/30/17  0848   WBC 10*3/mm3 6.17   HEMOGLOBIN g/dL 11.0*   HEMATOCRIT % 35.2   PLATELETS 10*3/mm3 398*            @LABRCNTIP(chol,trig,hdl,ldl)     I personally viewed and interpreted the patient's EKG/Telemetry data     ECG: ordered  Echocardiogram: Done at [facility] office today, report not available     Past echo 2/28/2017      Interpretation Summary       · Left ventricular function is normal. Calculated EF = 64%. Estimated EF was in agreement with the calculated EF. Normal left ventricular cavity size and wall thickness noted. All left ventricular  "wall segments contract normally.  · Left ventricular diastolic function is normal.  · There is a trivial pericardial effusion adjacent to the right ventricle.  · No significant valvular stenosis or insufficiency.         XRAY:ordered   )      Imaging Results (last 24 hours)      ** No results found for the last 24 hours. **                     Patient Active Problem List   Diagnosis   • Breast cancer metastasized to brain   • Breast cancer metastasized to liver   • Encounter for long-term (current) use of high-risk medication   • Fitting and adjustment of vascular catheter   • Intractable pain   • Nausea and vomiting   • Malignant neoplasm metastatic to left lung   • Leukocytopenia   • Anemia associated with chemotherapy   • Dyspnea, unspecified   • Hyponatremia syndrome   • Bilateral pleural effusion   • Chemotherapy induced neutropenia   • Malignant neoplasm of other specified sites of female breast   • Cancer-related pain             Assessment/ Plan :  Chest pain-denies This is more likely due to pleural effusion  Takes chemotherapy     Pericardial effusion by repeat limited echo done, improved reported by the patient and cancelled pericasrdial centesis today     Cough- \"cancer cough\"- better. Looks like left pleurax cathetor is clogged with large left effusion     Ongoing recommendations to follow, discussed with and ongoing management by Dr. SAMARA Perez-Linh Perez MD,[facility]  6/2/2017, 2:36 PM     Patient personally interviewed and above subjective findings personally confirmed during a face to face contact with patient today, and I personally performed the physical examination.  All labs, cardiac procedures,pertinent radiographs of the last 24 hours personally reviewed, Impression and plans discussed/elaborated and implemented by me or jointly as described above -----------Mg Perez MD                   Revision History       Date/Time User Provider Type Action "     6/2/2017 11:43 PM Hemant Perez MD Physician Sign     6/2/2017  2:55 PM RAZ Julio Nurse Practitioner Share     View Details Report          Routing History       Date/Time From To Method     6/2/2017 11:43 PM MD Chester Fonseca MD In Basket                     The following portions of the patient's history were reviewed and updated as appropriate: allergies, current medications, past family history, past medical history, past social history, past surgical history and problem list.  Past Medical History:   Diagnosis Date   • Anemia     intermittent which responded to iron.    • Breast cancer     Left, ER/MT negative, HER-2 positive    • Liver metastasis     biopsy proven    • Metastasis to brain     irradiated on 7/10/2015 w/ focus radiation       Scheduled Meds:    Current Facility-Administered Medications:   •  bisacodyl (DULCOLAX) EC tablet 10 mg, 10 mg, Oral, Daily PRN, Chester Lynne MD  •  enoxaparin (LOVENOX) syringe 40 mg, 40 mg, Subcutaneous, Q24H, Hemant Perez MD, 40 mg at 06/06/17 1356  •  fentaNYL (DURAGESIC) 25 MCG/HR patch 1 patch, 1 patch, Transdermal, Q72H, Chester Lynne MD, 1 patch at 06/06/17 0825  •  Glycerin-Hypromellose- (ARTIFICIAL TEARS) 0.2-0.2-1 % ophthalmic solution solution 1 drop, 1 drop, Both Eyes, Q1H PRN, Chester Lynne MD  •  HYDROmorphone (DILAUDID) injection 1 mg, 1 mg, Intravenous, Q2H PRN, Chester Lynne MD, 1 mg at 06/06/17 1551  •  lactulose (CHRONULAC) 10 GM/15ML solution 10 g, 10 g, Oral, Q12H PRN, Poly Foreman MD  •  ondansetron (ZOFRAN) tablet 8 mg, 8 mg, Oral, Q8H PRN, Chester Lynne MD  •  polyethylene glycol (MIRALAX) powder 17 g, 17 g, Oral, Daily PRN, Poly Foreman MD, 17 g at 06/05/17 9008  •  prochlorperazine (COMPAZINE) tablet 10 mg, 10 mg, Oral, Q6H PRN, Chester Lynne MD  •  promethazine (PHENERGAN) injection 12.5 mg, 12.5 mg, Intravenous, Q6H PRN, Ted Manley MD, 12.5 mg at  "06/06/17 0819  •  promethazine (PHENERGAN) tablet 12.5 mg, 12.5 mg, Oral, Q6H PRN, Chester Lynne MD, 12.5 mg at 06/03/17 0634  •  Scopolamine (TRANSDERM-SCOP) 1.5 MG/3DAYS patch 1 patch, 1 patch, Transdermal, Q72H, Chester Lynne MD, 1 patch at 06/06/17 0825  •  sennosides-docusate sodium (SENOKOT-S) 8.6-50 MG tablet 2 tablet, 2 tablet, Oral, Daily, Chester Lynne MD, 2 tablet at 06/06/17 0825  •  sodium chloride 0.9 % flush 10 mL, 10 mL, Intracatheter, Q12H, Yokasta N Head, APRN, 10 mL at 06/06/17 0828  •  sodium chloride 0.9 % flush 10 mL, 10 mL, Intracatheter, PRN, Yokasta N Head, APRN    All systems were reviewed and negative except for:  Respiratory: positive for  shortness of air    /71 (BP Location: Right arm, Patient Position: Sitting)  Pulse 120  Temp 97.5 °F (36.4 °C) (Oral)   Resp 18  Ht 67\" (170.2 cm)  Wt 124 lb (56.2 kg)  SpO2 98%  BMI 19.42 kg/m2       Intake/Output Summary (Last 24 hours) at 06/06/17 2200  Last data filed at 06/06/17 1832   Gross per 24 hour   Intake              940 ml   Output                0 ml   Net              940 ml          Wt Readings from Last 1 Encounters:   06/05/17 0520 124 lb (56.2 kg)   06/04/17 0554 124 lb (56.2 kg)   06/03/17 0537 124 lb (56.2 kg)   06/02/17 1207 123 lb 4.8 oz (55.9 kg)       Physical Examination: By         Physical Exam    General: Mild respiratory distress and short of breath   Skin: Warm and dry, no diaphoresis noted   HEENT: No ptosis;  oral mucosa moist   Neck: Supple; no carotid bruits; no JVD, Trachea mid line   Heart: S1S2  regular rate and rhythm; tachycardic, soft systolic  murmurs; no gallop or rub appreciated, No thrills palpable   Chest: Respirations unlabored at rest, bilateral breath sounds have decreased air entry; left side worse, no  wheezes auscultated.     Abdomen: Soft, non-tender, -distended, positive bowel sounds  ,No aneurysms palpable   Extremities: Bilateral lower extremities have no pre-tibial " pitting edema; Radials are palpable   Neurological: Alert and oriented ; no new motor deficits,       Lab Review:  Personally reviewed the labs, radiology imaging and other cardiac procedures.         Results from last 7 days  Lab Units 06/06/17  0501   SODIUM mmol/L 134*   POTASSIUM mmol/L 4.2   CHLORIDE mmol/L 96*   TOTAL CO2 mmol/L 31.4*   BUN mg/dL 17   CREATININE mg/dL 0.46*   CALCIUM mg/dL 8.9   BILIRUBIN mg/dL 0.5   ALK PHOS U/L 137*   ALT (SGPT) U/L 37*   AST (SGOT) U/L 36*   GLUCOSE mg/dL 88       Results from last 7 days  Lab Units 06/02/17  1432   TROPONIN T ng/mL <0.010     @LABRCNTbnp@    Results from last 7 days  Lab Units 06/06/17  0501 06/05/17  0649 06/04/17  0641   WBC 10*3/mm3 5.81 5.34 7.36   HEMOGLOBIN g/dL 10.3* 11.4* 12.5   HEMATOCRIT % 32.3* 35.9 39.5   PLATELETS 10*3/mm3 277 331 430           Estimated Creatinine Clearance: 119.7 mL/min (by C-G formula based on Cr of 0.46).    I personally viewed and interpreted the patient's EKG/Telemetry data        Patient Active Problem List   Diagnosis   • Breast cancer metastasized to brain   • Breast cancer metastasized to liver   • Encounter for long-term (current) use of high-risk medication   • Fitting and adjustment of vascular catheter   • Intractable pain   • Nausea and vomiting   • Malignant neoplasm metastatic to left lung   • Leukocytopenia   • Anemia associated with chemotherapy   • Dyspnea, unspecified   • Hyponatremia syndrome   • Bilateral pleural effusion   • Chemotherapy induced neutropenia   • Malignant neoplasm of other specified sites of female breast   • Cancer-related pain   • Pleuritic chest pain   • Pericardial effusion without cardiac tamponade       Assessment/ Plan :    Will reasess the pleural effusion tomoroow with limited echo and if it is ok, can DC home    Mg Perez MD, FACC  6/6/20177:52 PM    Patient personally interviewed and above subjective findings personally confirmed during a face to face contact with  patient today, and I personally performed the  physical examination.  All labs, cardiac procedures,pertinent radiographs of the last 24 hours personally reviewed, Impression and plans discussed/elaborated and implemented by me or jointly as described above -----------Mg Perez MD

## 2017-06-07 NOTE — PROGRESS NOTES
Subjective     CHIEF COMPLAINT:     Metastatic breat cancer; pericardial effusion, pleural effusion    Subjective: nausea and right sided chest pain improving. Ambulating. Tolerated Abraxane well yesterday. No new concerns. Drained about 100 ml from left pleurX.      Past Medical History:   Diagnosis Date   • Anemia     intermittent which responded to iron.    • Breast cancer     Left, ER/NM negative, HER-2 positive    • Liver metastasis     biopsy proven    • Metastasis to brain     irradiated on 7/10/2015 w/ focus radiation       Past Surgical History:   Procedure Laterality Date   • CARDIAC CATHETERIZATION N/A 6/5/2017    Procedure: Pericardiocentesis;  Surgeon: Hemant Perez MD;  Location: [facility] CATH INVASIVE LOCATION;  Service:    • CARDIAC CATHETERIZATION  6/5/2017    Procedure: Case Abort;  Surgeon: Hemant Perez MD;  Location: [facility] CATH INVASIVE LOCATION;  Service:    • MASTECTOMY Left 12/30/2014   • PLEURAL CATHETER INSERTION Left 5/1/2017    Procedure: PLEURX CATHETER INSERTION;  Surgeon: Asael Arriaga MD;  Location: [facility] MAIN OR;  Service:    • PORTACATH PLACEMENT  06/2014       SCHEDULED MEDS:    enoxaparin 40 mg Subcutaneous Q24H   fentaNYL 1 patch Transdermal Q72H   Scopolamine 1 patch Transdermal Q72H   sennosides-docusate sodium 2 tablet Oral Daily   sodium chloride 10 mL Intracatheter Q12H     INFUSIONS:     PRN MEDS:  •  bisacodyl  •  Glycerin-Hypromellose-  •  HYDROmorphone  •  lactulose  •  ondansetron  •  polyethylene glycol  •  prochlorperazine  •  promethazine  •  promethazine  •  sodium chloride    REVIEW OF SYSTEMS:  GENERAL:  weak.   SKIN:  No skin rashes or lesions.  HEME/LYMPH: Mild Anemia.   RESPIRATORY:  Shortness of breath. Cough.   CVS:  Chest pain. Palpitations. No Lower extremity swelling.  GI:  No Abdominal pain. No Nausea. No Vomiting.   MUSCULOSKELETAL:  No Bone pain. No Joint pain. No Joint stiffness.    Objective   VITAL SIGNS:  Temp:  [97.5 °F  (36.4 °C)-97.9 °F (36.6 °C)] 97.5 °F (36.4 °C)  Heart Rate:  [] 113  Resp:  [16-18] 16  BP: (100-118)/(61-72) 100/61    Wt Readings from Last 3 Encounters:   06/05/17 124 lb (56.2 kg)   06/02/17 127 lb (57.6 kg)   05/30/17 127 lb 12.8 oz (58 kg)         PHYSICAL EXAMINATION  GENERAL:  Thin female in NAD, comfortable but coughing after deep breaths.   SKIN:  No skin rashes or lesions. No ecchymosis or petechiae.  HEAD:  Normocephalic.  EYES:  No Jaundice. Pallor.  LYMPHATICS:  No Lymphadenopathy.  CHEST:  Decrease in breath sounds in left lung base with pleurX in place - improved lower lung zone movement after drainage.  CARDIAC:  Tachycardic.  EXTREMITIES:  No Edema. No Cyanosis. No Calf tenderness.  NEUROLOGICAL:  No Focal neurological deficits.      RESULT REVIEW:     Results from last 7 days  Lab Units 06/07/17  0425 06/06/17  0501 06/05/17  0649 06/04/17  0641 06/03/17  0621 06/02/17  1432   WBC 10*3/mm3 5.58 5.81 5.34 7.36 7.05 7.52   NEUTROS ABS 10*3/mm3 4.41 3.98 4.03 5.26  --  6.87   HEMOGLOBIN g/dL 10.6* 10.3* 11.4* 12.5 11.1* 11.3*   HEMATOCRIT % 32.8* 32.3* 35.9 39.5 34.6* 35.0*   PLATELETS 10*3/mm3 378 277 331 430 389 354       Results from last 7 days  Lab Units 06/07/17  0425 06/06/17  0501 06/05/17  0649  06/02/17  1432   SODIUM mmol/L 135* 134* 134*  < > 135*   POTASSIUM mmol/L 4.2 4.2 4.0  < > 4.5   CHLORIDE mmol/L 95* 96* 95*  < > 96*   TOTAL CO2 mmol/L 29.6* 31.4* 28.3  < > 25.4   BUN mg/dL 16 17 18  < > 15   CREATININE mg/dL 0.47* 0.46* 0.55*  < > 0.56*   CALCIUM mg/dL 8.5* 8.9 8.8  < > 8.7   ALBUMIN g/dL  --  2.60*  --   --  2.80*   BILIRUBIN mg/dL  --  0.5  --   --  0.9   ALK PHOS U/L  --  137*  --   --  174*   ALT (SGPT) U/L  --  37*  --   --  49*   AST (SGOT) U/L  --  36*  --   --  45*   < > = values in this interval not displayed.      CXR:  IMPRESSION:  Slightly less dense opacification of the left hemithorax.  Otherwise, no significant change.    Assessment/Plan   1.  Metastatic  HER-2 positive breast cancer to brain, liver, bone and chest wall.  She progressed on recent therapy with Tykerb with Xeloda and she was intolerant to Navelbine with Herceptin.  She was seen by Dr. Manley at our office on 5/30/17 and her treatment was switched to Abraxane and Herceptin.   * 6/6/17: day 8 Abraxane given.    * Lab/RN review in 1 week   * Keep outpatient appt with Dr Manley on 6/20 with chemotherapy.     2.  Pleural effusion.  Right thoracentesis 6/4/17; left pleurX drainage planned 3x/wk.  Activase was instilled into PleurX catheter on 6/2/17.       3.  Pericardial effusion. She was seen by Dr. Perez on 6/2/17 and had an ECHO done revealing a large pericardial effusion. She does not have cardiac tamponade.  Repeat echocardiogram showed improvement, likely related to benefit from chemotherapy.    - Repeat echo ordered    4.  History of neutropenia due to chemotherapy. Labs good today. Will monitor. Lab check next week  5.  Severe right chest pain after thoracocentesis. CXR without PTX. Improving.   6. Nausea. Prn medications. Improved.     OK for discharge from oncology standpoint once ok from cardiology.     Poly Foreman MD  06/07/17

## 2017-06-08 NOTE — PAYOR COMM NOTE
"Yury Cardona (56 y.o. Female)                                                         ATTACHED IS DISCHARGE SUMMARY                                                                REF# [id number]                                                       CONTACT=    KEIRA DIETRICH                                                      FAX         =      [phone]                                                       CB          =       [phone]        Date of Birth Social Security Number Address Home Phone MRN    1960  [id number] [place] [address] [phone] [id number]    Sikhism Marital Status          Muslim        Admission Date Admission Type Admitting Provider Attending Provider Department, Room/Bed    6/2/17 Urgent Chester Lynne MD  [address], [facility]6/1    Discharge Date Discharge Disposition Discharge Destination        6/8/2017 Home or Self Care             Attending Provider: (none)    Allergies:  Latex    Isolation:  None   Infection:  None   Code Status:  Conditional    Ht:  67\" (170.2 cm)   Wt:  124 lb (56.2 kg)    Admission Cmt:  medicare, anthem   Principal Problem:  Pericardial effusion without cardiac tamponade [I31.3]                 Active Insurance as of 6/2/2017     Primary Coverage     Payor Plan Insurance Group Employer/Plan Group    [employer] EMPLOYEE [id number]     Payor Plan Address Payor Plan Phone Number Effective From Effective To    [address] [phone] 1/1/2015     [place] [address]       Subscriber Name Subscriber Birth Date Member ID       JEFF CARDONA 11/27/1954 [id number]           Secondary Coverage     Payor Plan Insurance Group Employer/Plan Group    MEDICARE MEDICARE A & B      Payor Plan Address Payor Plan Phone Number Effective From Effective To    [address] [phone] 4/26/2017     [place] [address]       Subscriber Name Subscriber Birth Date Member ID       " YURY MOTT 1960 [id number]                 Emergency Contacts      (Rel.) Home Phone Work Phone Mobile Phone    Evaristo Mott (Spouse) [phone] -- [phone]            Insurance Information                [employer] EMPLOYEE Phone: [phone]    Subscriber: Evaristo Mott Subscriber#: [id number]    Group#: [id number] Precert#:         MEDICARE/MEDICARE A & B Phone: [phone]    Subscriber: Yury Mott Subscriber#: [id number]    Group#:  Precert#:              Discharge Summary      Chester Lynne MD at 2017  8:56 AM          PHYSICIAN DISCHARGE SUMMARY  [facility], [facility]    Patient Identification:  Name: Yury Mott  Age: 56 y.o.  Sex: female  :  1960  MRN: [id number]    Primary Care Physician: Ted Manley MD    Admit date: 2017  Discharge date and time:2017    Discharged Condition: Fair    Discharge Diagnoses:  Principal Problem:    Pericardial effusion without cardiac tamponade  Active Problems:    Breast cancer metastasized to brain    Breast cancer metastasized to liver    Dyspnea, unspecified    Bilateral pleural effusion    Cancer-related pain    Pleuritic chest pain    Malignant neoplasm metastatic to left lung    Patient Active Problem List   Diagnosis Code   • Breast cancer metastasized to brain C50.919, C79.31   • Breast cancer metastasized to liver C50.919, C78.7   • Encounter for long-term (current) use of high-risk medication Z79.899   • Fitting and adjustment of vascular catheter Z45.2   • Intractable pain R52   • Nausea and vomiting R11.2   • Malignant neoplasm metastatic to left lung C78.02   • Leukocytopenia D72.819   • Anemia associated with chemotherapy D64.81, T45.1X5A   • Dyspnea, unspecified R06.00   • Hyponatremia syndrome E87.1   • Bilateral pleural effusion J90   • Chemotherapy induced neutropenia D70.1, T45.1X5A   • Malignant neoplasm of other  specified sites of female breast C50.919   • Cancer-related pain G89.3   • Pleuritic chest pain R07.81   • Pericardial effusion without cardiac tamponade I31.3          Hospital Course: Yury Mott  is a 55 y/o female, with h/o metastatic breast cancer, with metastasis to brain, lungs, pleura, and liver which is apparently progressing despite multiple previous chemotherapy regimens. However, now there has been some response to current regimen. . She does have a malignant left pleural effusion that has caused SOB before and a PleuRx  placed at the end of April 2017. She was found to have pericardial effusion and was then seen by Dr. Perez. She was complaining of chest pain, pressure-like, intermittent, getting  worse with deep inspiration. No radiation of the pain, no other symptoms associated with it (Nausea, vomiting, diaphoresis, dizziness). She did have SOB and severe fatigue and weight loss. Due to the severity of the symptoms, she was admitted for further management. Initially, after activase, 350 cc pleural fluid was drained from PleurX catheter with some symptom relief. She then underwent an Ultrasound guided right thoracentesis with 850 cc fluid removed. Again, she had significant symptom relief. She was scheduled to then undergo pericardiocentesis, but this was cancelled as the pericardial effusion was quite small and she was asymptomatic. She received previously scheduled Chemotherapy while here. She was due to be discharged yesterday, but suddenly developed Left sided CP. Her troponin, EGG, CXR were essentially unchanged. This morning, she has no CP, SOA, N/V/D and she is anxious to be discharged home.        PMHX:   Past Medical History:   Diagnosis Date   • Anemia     intermittent which responded to iron.    • Breast cancer     Left, ER/ME negative, HER-2 positive    • Liver metastasis     biopsy proven    • Metastasis to brain     irradiated on 7/10/2015 w/ focus radiation     PSHX:   Past  "Surgical History:   Procedure Laterality Date   • CARDIAC CATHETERIZATION N/A 6/5/2017    Procedure: Pericardiocentesis;  Surgeon: Hemant Perez MD;  Location:  [facility];  Service:    • CARDIAC CATHETERIZATION  6/5/2017    Procedure: Case Abort;  Surgeon: Hemant Perez MD;  Location:  [facility];  Service:    • MASTECTOMY Left 12/30/2014   • PLEURAL CATHETER INSERTION Left 5/1/2017    Procedure: PLEURX CATHETER INSERTION;  Surgeon: Asael Arriaga MD;  [facility];  Service:    • PORTACATH PLACEMENT  06/2014           Consults:     Consults     Date and Time Order Name Status Description    6/2/2017 2332 Inpatient Consult to Hematology & Oncology      6/2/2017 1411 Inpatient Consult to Cardiology Completed     6/2/2017 1347 Inpatient Consult to Cardiothoracic Surgery Completed           Discharge Exam:    /68 (BP Location: Right arm, Patient Position: Lying)  Pulse 102  Temp 98.4 °F (36.9 °C) (Oral)   Resp 16  Ht 67\" (170.2 cm)  Wt 124 lb (56.2 kg)  SpO2 99%  BMI 19.42 kg/m2    General: Alert, cooperative; appears stated age. In no acute distress    Head: Normocephalic, without obvious abnormality, atraumatic  Neck: Supple, symmetrical;  trachea midline without adenopathy;              Thyroid with no enlargement/tenderness/nodules;              no carotid bruit or JVD  Cardiovascular: Normal rate, regular rhythm and intact distal pulses.                              Exam reveals no gallop and no friction rub. No murmur heard  Pulmonary: Bibasilar rhonchi, respirations unlabored. Diminished BS L>R                       No wheezing or rales.   Abdominal: Soft,  non-tender, bowel sounds active all four quadrants;                          no masses,  hepatomegaly, or  splenomegaly.   Extremities: Normal, atraumatic, no cyanosis or edema  Pulses:     2 + symmetric all extremities  Neurological: She is alert and oriented to person, place, " and time.                          CNII-XII intact, normal strength, sensation intact throughout        Data Review:        Results from last 7 days  Lab Units 06/08/17  0604 06/07/17  0425 06/06/17  0501   WBC 10*3/mm3 5.05 5.58 5.81   HEMOGLOBIN g/dL 10.2* 10.6* 10.3*   HEMATOCRIT % 31.9* 32.8* 32.3*   PLATELETS 10*3/mm3 326 378 277         Results from last 7 days  Lab Units 06/08/17  0604 06/07/17  0425 06/06/17  0501  06/02/17  1432   SODIUM mmol/L 140 135* 134*  < > 135*   POTASSIUM mmol/L 3.9 4.2 4.2  < > 4.5   CHLORIDE mmol/L 102 95* 96*  < > 96*   TOTAL CO2 mmol/L 29.7* 29.6* 31.4*  < > 25.4   BUN mg/dL 19 16 17  < > 15   CREATININE mg/dL 0.55* 0.47* 0.46*  < > 0.56*   CALCIUM mg/dL 8.6 8.5* 8.9  < > 8.7   BILIRUBIN mg/dL  --   --  0.5  --  0.9   ALK PHOS U/L  --   --  137*  --  174*   ALT (SGPT) U/L  --   --  37*  --  49*   AST (SGOT) U/L  --   --  36*  --  45*   GLUCOSE mg/dL 84 113* 88  < > 126*   < > = values in this interval not displayed.          0  Lab Value Date/Time   TROPONINT <0.010 06/07/2017 1736   TROPONINT <0.010 06/02/2017 1432       Microbiology Results (last 10 days)     ** No results found for the last 240 hours. **           Imaging Results (all)     Procedure Component Value Units Date/Time    XR Chest PA & Lateral [[id number]] Collected:  06/02/17 1613     Updated:  06/02/17 1622    Narrative:       PA AND LATERAL CHEST     CLINICAL HISTORY: Metastatic breast carcinoma. Malignant effusion.  Dyspnea.     Compared to the previous portable chest x-ray dated 05/01/2017.     There is a large left pleural effusion producing near complete  compressive atelectasis of the left lung that shows slight interval  increase in size, despite the presence of a Pleurx chest tube. A small  right pleural effusion has also developed in the interval. The right  lung appears slightly better expanded and remains free of focal  infiltrates. There is atelectasis in the medial aspect of the right lung  base. A  Mediport device is in place in satisfactory position in the  right subclavian vein without change.     This report was finalized on 6/2/2017 4:19 PM by Dr. Tyson Thompson MD.       US Thoracentesis Right [[id number]] Collected:  06/04/17 1401     Updated:  06/04/17 1404    Narrative:       HISTORY: Right pleural effusion.     PROCEDURE: Ultrasound guided right thoracentesis     Procedure Note: Informed consent was obtained. Preliminary sonography of  the right hemithorax was performed. A pleural effusion was visualized.  The overlying skin was prepped in the usual sterile fashion. Local  anesthesia was achieved with 1% lidocaine. A small nick was made in the  skin with a scalpel. Through the nick was inserted a needle catheter  device under sonographic guidance. The needle was removed and the  catheter remained and was connected to suction. 850 cc of bloody fluid  was withdrawn. The patient tolerated the procedure without evidence for  complication and left the procedure room in stable condition.       Impression:       1. Technically successful ultrasound guided right thoracentesis.     This report was finalized on 6/4/2017 2:01 PM by Dr. Devon Pastrana MD.       CT Chest With Contrast [[id number]] Collected:  06/03/17 1353     Updated:  06/04/17 1608    Narrative:       EXAMINATION: CT OF THE CHEST WITH CONTRAST     HISTORY: 56-year-old female with history of metastatic breast carcinoma  undergoing possible collapse of the left lung and a pericardial  effusion.     TECHNIQUE: Contiguous axial images were obtained through the chest  following intravenous administration of nonionic contrast.     COMPARISON: Chest x-ray, 06/02/2017.     FINDINGS: There is a moderately large pericardial effusion that measures  on the order of 2.3 cm in thickness. There is a small bore left-sided  chest tube positioned with the tip at the apex of the left pleural  space. A small to moderate-sized left pleural effusion that  is  predominantly subpulmonic in volume remains. There is considerable  consolidation throughout the left lung with only approximately 25% of  the left lung demonstrating aeration. Interstitial opacification and  patchy areas of consolidation are noted throughout this region.     There is a large right pleural effusion. Nodular areas of groundglass  opacification is seen within the right upper lobe, right middle lobe and  right lower lobe. Volume loss at the base of the right lower lobe is  noted.     There is evidence of prior left mastectomy. No evidence for axillary  adenopathy is appreciated. The visualized soft tissue structures of the  upper abdomen have a normal appearance.       Impression:       1. Moderately large pericardial effusion as described.  2. Moderate-sized left pleural effusion with considerable consolidation  and volume loss throughout the left lung. The imaging features suggest a  combination of atelectasis, pneumonia and likely metastatic disease  within the left lung.  3. Large right pleural effusion.  4. Groundglass opacities throughout the right upper lobe and right lower  lobe which may represent pulmonary metastases or an atypical pneumonia,  however pulmonary metastases are favored.     This report was finalized on 6/4/2017 4:05 PM by Dr. Devon Pastrana MD.       XR Chest 1 View [[id number]] Collected:  06/05/17 0853     Updated:  06/05/17 1241    Narrative:       HISTORY: 56-year-old female with metastatic breast cancer, pleural  effusions, shortness of breath, left PleurX catheter, now 1 day status  post right thoracentesis.     PORTABLE AP ERECT CHEST DATED 06/05/2017 AT 0523 HOURS.     FINDINGS: When compared to PA and lateral chest radiographs of  06/02/2017 and CT chest exam of 06/03/2017, there is again subtotal  opacification of the left chest with some modest patchy aeration of left  upper lung parenchyma present. The left PleurX catheter is again  demonstrated. The right  subclavian port catheter remains and terminates  in the distribution of superior vena cava. The cardiac silhouette is  largely obscured but appears likely top normal to mildly enlarged  caliber, compatible with demonstration of pericardial effusion on CT  exam of 06/03/2017. Some trace residual right pleural fluid versus  pleural thickening at the lateral right pleural stripe and right  costophrenic angle remain. Monitoring lead wires are present. Oxygen  cannula tubing is present about the neck and right shoulder. No large  pneumothorax is seen. There may be a 1 mm trace superolateral and apical  right pneumothorax. A tiny air bubble at the left apex projecting  adjacent to the PleurX catheter is noted.     CONCLUSION: Decreased right pleural fluid with minimal if any right  apical pneumothorax. Tiny trace of air lucency at the left apex with  superimposed PleurX catheter noted. There is again subtotal  opacification of the left chest and apparent enlargement of cardiac  silhouette as discussed above.     This report was finalized on 6/5/2017 12:38 PM by Dr. Moe Coon MD.       XR Chest PA & Lateral [[id number]] Collected:  06/05/17 1708     Updated:  06/05/17 1715    Narrative:       XR CHEST PA AND LATERAL-     HISTORY: Female who is 56 years-old,  right sided pain     TECHNIQUE: Frontal and lateral views of the chest     COMPARISON: 06/05/2017     FINDINGS: Stable appearing right chest port, left chest tube. Heart size  is difficult to characterize as a result of obscuration of the left  cardiac margin. Opacification of the left hemithorax appears slightly  less dense. Minimal right pleural effusion is suggested. No  pneumothorax. Otherwise stable.       Impression:       Slightly less dense opacification of the left hemithorax.  Otherwise, no significant change.     This report was finalized on 6/5/2017 5:12 PM by Dr. Graham Laureano MD.       XR Chest 1 View [[id number]] Collected:  06/07/17 0488      Updated:  06/07/17 1846    Narrative:       AP CHEST 06/07/2017.     HISTORY: Chest pain.     COMPARISON: Previous study dated 06/05/2017.     FINDINGS: There is volume loss of the left hemithorax with diffuse  infiltrate and probable pleural fluid in the left hemithorax. Left chest  tube terminates in the left apex. There may be some minimal atelectasis  or infiltrate in the right lung base. The right costophrenic sulcus has  cleared somewhat since the 05/05/2017 study. Mediport catheter is seen  in good position.     No pneumothorax is seen..       Impression:       1. Slight clearing of the right costophrenic sulcus since the previous  study of 06/05/2017. Chest otherwise appears essentially unchanged.  Please see additional dictation for the CT of the chest from 06/03/2017.               Disposition:    Home    Patient Instructions: See After Visit Summary for Medications      Additional Instructions for the Follow-ups that You Need to Schedule     Ambulatory Referral to Home Health    As directed    Face to Face Visit Date:  6/7/2017   Follow-up Provider for Plan of Care?:  I treated the patient in an acute care facility and will not continue treatment after discharge.   Follow-up Provider:  ANSHUL MACIAS   Reason/Clinical Findings:  left malignant pleural effusion needing drainage via PleuRx   Describe mobility limitations that make leaving home difficult:  Weakness, unable to leave the house without help from another person   Nursing/Therapeutic Services Requested:  Skilled Nursing   Skilled nursing orders:  O2 instruction Comment - PleuRx   Frequency:  1 Week 1           Additional information on Labs and Follow-ups:      See Dr. Perez in 1 week.              Follow-up Information     Follow up with [facility] .    Specialty:  Home Health Services    Contact information:    [address]  [facility] [address] [phone]        Follow up with Ted  NOELLE Manley MD Follow up in 1 week(s).    Specialties:  Hematology and Oncology, Oncology, Hematology    Contact information:    [address] [address]  [phone]          Follow up with [facility] [place] [facility] [place] .    Specialty:  Home Health Services    Contact information:    [address] [address]          Discharge Order     Start     Ordered    06/07/17 1659  Discharge patient  Once     Expected Discharge Date:  06/07/17    Discharge Disposition:  Home or Self Care        06/07/17 1702        Follow-up Information     Follow up with [facility] .    Specialty:  Home Health Services    Contact information:    [address] [address] [phone]        Follow up with Ted Manley MD Follow up in 1 week(s).    Specialties:  Hematology and Oncology, Oncology, Hematology    Contact information:    [address] [address]  [phone]          Follow up with [facility] [place] [facility] [place] .    Specialty:  Home Health Services    Contact information:    [address] [address]            Total time spent discharging patient including evaluation,post hospitalization follow up,  medication and post hospitalization instructions and education total time exceeds 30 minutes.    Signed:  Chester Lynne MD  6/8/2017  10:03 AM      Much of this encounter note is an electronic transcription/translation of spoken language to printed text. The electronic translation of spoken language may permit erroneous, or at times, nonsensical words or phrases to be inadvertently transcribed; Although I have reviewed the note for such errors, some may still exist     Electronically signed by Chester Lynne MD at 6/8/2017 10:03 AM

## 2017-06-08 NOTE — DISCHARGE SUMMARY
PHYSICIAN DISCHARGE SUMMARY  [facility], [facility]    Patient Identification:  Name: Yury Mott  Age: 56 y.o.  Sex: female  :  1960  MRN: [id number]    Primary Care Physician: Ted Manley MD    Admit date: 2017  Discharge date and time:2017    Discharged Condition: Fair    Discharge Diagnoses:  Principal Problem:    Pericardial effusion without cardiac tamponade  Active Problems:    Breast cancer metastasized to brain    Breast cancer metastasized to liver    Dyspnea, unspecified    Bilateral pleural effusion    Cancer-related pain    Pleuritic chest pain    Malignant neoplasm metastatic to left lung    Patient Active Problem List   Diagnosis Code   • Breast cancer metastasized to brain C50.919, C79.31   • Breast cancer metastasized to liver C50.919, C78.7   • Encounter for long-term (current) use of high-risk medication Z79.899   • Fitting and adjustment of vascular catheter Z45.2   • Intractable pain R52   • Nausea and vomiting R11.2   • Malignant neoplasm metastatic to left lung C78.02   • Leukocytopenia D72.819   • Anemia associated with chemotherapy D64.81, T45.1X5A   • Dyspnea, unspecified R06.00   • Hyponatremia syndrome E87.1   • Bilateral pleural effusion J90   • Chemotherapy induced neutropenia D70.1, T45.1X5A   • Malignant neoplasm of other specified sites of female breast C50.919   • Cancer-related pain G89.3   • Pleuritic chest pain R07.81   • Pericardial effusion without cardiac tamponade I31.3          Hospital Course: Yury Mott  is a 57 y/o female, with h/o metastatic breast cancer, with metastasis to brain, lungs, pleura, and liver which is apparently progressing despite multiple previous chemotherapy regimens. However, now there has been some response to current regimen. . She does have a malignant left pleural effusion that has caused SOB before and a PleuRx  placed at the end of 2017. She was found to have pericardial  effusion and was then seen by Dr. Perez. She was complaining of chest pain, pressure-like, intermittent, getting  worse with deep inspiration. No radiation of the pain, no other symptoms associated with it (Nausea, vomiting, diaphoresis, dizziness). She did have SOB and severe fatigue and weight loss. Due to the severity of the symptoms, she was admitted for further management. Initially, after activase, 350 cc pleural fluid was drained from PleurX catheter with some symptom relief. She then underwent an Ultrasound guided right thoracentesis with 850 cc fluid removed. Again, she had significant symptom relief. She was scheduled to then undergo pericardiocentesis, but this was cancelled as the pericardial effusion was quite small and she was asymptomatic. She received previously scheduled Chemotherapy while here. She was due to be discharged yesterday, but suddenly developed Left sided CP. Her troponin, EGG, CXR were essentially unchanged. This morning, she has no CP, SOA, N/V/D and she is anxious to be discharged home.        PMHX:   Past Medical History:   Diagnosis Date   • Anemia     intermittent which responded to iron.    • Breast cancer     Left, ER/IA negative, HER-2 positive    • Liver metastasis     biopsy proven    • Metastasis to brain     irradiated on 7/10/2015 w/ focus radiation     PSHX:   Past Surgical History:   Procedure Laterality Date   • CARDIAC CATHETERIZATION N/A 6/5/2017    Procedure: Pericardiocentesis;  Surgeon: Hemant Perez MD;  Location: [facility] CATH INVASIVE LOCATION;  Service:    • CARDIAC CATHETERIZATION  6/5/2017    Procedure: Case Abort;  Surgeon: Hemant Perez MD;  Location: [facility] CATH INVASIVE LOCATION;  Service:    • MASTECTOMY Left 12/30/2014   • PLEURAL CATHETER INSERTION Left 5/1/2017    Procedure: PLEURX CATHETER INSERTION;  Surgeon: Asael Arriaga MD;  Location: [facility] OR;  Service:    • PORTACATH PLACEMENT  06/2014           Consults:  "    Consults     Date and Time Order Name Status Description    6/2/2017 2332 Inpatient Consult to Hematology & Oncology      6/2/2017 1411 Inpatient Consult to Cardiology Completed     6/2/2017 1347 Inpatient Consult to Cardiothoracic Surgery Completed           Discharge Exam:    /68 (BP Location: Right arm, Patient Position: Lying)  Pulse 102  Temp 98.4 °F (36.9 °C) (Oral)   Resp 16  Ht 67\" (170.2 cm)  Wt 124 lb (56.2 kg)  SpO2 99%  BMI 19.42 kg/m2    General: Alert, cooperative; appears stated age. In no acute distress    Head: Normocephalic, without obvious abnormality, atraumatic  Neck: Supple, symmetrical;  trachea midline without adenopathy;              Thyroid with no enlargement/tenderness/nodules;              no carotid bruit or JVD  Cardiovascular: Normal rate, regular rhythm and intact distal pulses.                              Exam reveals no gallop and no friction rub. No murmur heard  Pulmonary: Bibasilar rhonchi, respirations unlabored. Diminished BS L>R                       No wheezing or rales.   Abdominal: Soft,  non-tender, bowel sounds active all four quadrants;                          no masses,  hepatomegaly, or  splenomegaly.   Extremities: Normal, atraumatic, no cyanosis or edema  Pulses:     2 + symmetric all extremities  Neurological: She is alert and oriented to person, place, and time.                          CNII-XII intact, normal strength, sensation intact throughout        Data Review:        Results from last 7 days  Lab Units 06/08/17  0604 06/07/17  0425 06/06/17  0501   WBC 10*3/mm3 5.05 5.58 5.81   HEMOGLOBIN g/dL 10.2* 10.6* 10.3*   HEMATOCRIT % 31.9* 32.8* 32.3*   PLATELETS 10*3/mm3 326 378 277         Results from last 7 days  Lab Units 06/08/17  0604 06/07/17  0425 06/06/17  0501  06/02/17  1432   SODIUM mmol/L 140 135* 134*  < > 135*   POTASSIUM mmol/L 3.9 4.2 4.2  < > 4.5   CHLORIDE mmol/L 102 95* 96*  < > 96*   TOTAL CO2 mmol/L 29.7* 29.6* 31.4*  < > " 25.4   BUN mg/dL 19 16 17  < > 15   CREATININE mg/dL 0.55* 0.47* 0.46*  < > 0.56*   CALCIUM mg/dL 8.6 8.5* 8.9  < > 8.7   BILIRUBIN mg/dL  --   --  0.5  --  0.9   ALK PHOS U/L  --   --  137*  --  174*   ALT (SGPT) U/L  --   --  37*  --  49*   AST (SGOT) U/L  --   --  36*  --  45*   GLUCOSE mg/dL 84 113* 88  < > 126*   < > = values in this interval not displayed.          0  Lab Value Date/Time   TROPONINT <0.010 06/07/2017 1736   TROPONINT <0.010 06/02/2017 1432       Microbiology Results (last 10 days)     ** No results found for the last 240 hours. **           Imaging Results (all)     Procedure Component Value Units Date/Time    XR Chest PA & Lateral [[id number]] Collected:  06/02/17 1613     Updated:  06/02/17 1622    Narrative:       PA AND LATERAL CHEST     CLINICAL HISTORY: Metastatic breast carcinoma. Malignant effusion.  Dyspnea.     Compared to the previous portable chest x-ray dated 05/01/2017.     There is a large left pleural effusion producing near complete  compressive atelectasis of the left lung that shows slight interval  increase in size, despite the presence of a Pleurx chest tube. A small  right pleural effusion has also developed in the interval. The right  lung appears slightly better expanded and remains free of focal  infiltrates. There is atelectasis in the medial aspect of the right lung  base. A Mediport device is in place in satisfactory position in the  right subclavian vein without change.     This report was finalized on 6/2/2017 4:19 PM by Dr. Tyson Thompson MD.       US Thoracentesis Right [[id number]] Collected:  06/04/17 1401     Updated:  06/04/17 1404    Narrative:       HISTORY: Right pleural effusion.     PROCEDURE: Ultrasound guided right thoracentesis     Procedure Note: Informed consent was obtained. Preliminary sonography of  the right hemithorax was performed. A pleural effusion was visualized.  The overlying skin was prepped in the usual sterile fashion. Local  anesthesia  was achieved with 1% lidocaine. A small nick was made in the  skin with a scalpel. Through the nick was inserted a needle catheter  device under sonographic guidance. The needle was removed and the  catheter remained and was connected to suction. 850 cc of bloody fluid  was withdrawn. The patient tolerated the procedure without evidence for  complication and left the procedure room in stable condition.       Impression:       1. Technically successful ultrasound guided right thoracentesis.     This report was finalized on 6/4/2017 2:01 PM by Dr. Devon Pastrana MD.       CT Chest With Contrast [[id number]] Collected:  06/03/17 1353     Updated:  06/04/17 1608    Narrative:       EXAMINATION: CT OF THE CHEST WITH CONTRAST     HISTORY: 56-year-old female with history of metastatic breast carcinoma  undergoing possible collapse of the left lung and a pericardial  effusion.     TECHNIQUE: Contiguous axial images were obtained through the chest  following intravenous administration of nonionic contrast.     COMPARISON: Chest x-ray, 06/02/2017.     FINDINGS: There is a moderately large pericardial effusion that measures  on the order of 2.3 cm in thickness. There is a small bore left-sided  chest tube positioned with the tip at the apex of the left pleural  space. A small to moderate-sized left pleural effusion that is  predominantly subpulmonic in volume remains. There is considerable  consolidation throughout the left lung with only approximately 25% of  the left lung demonstrating aeration. Interstitial opacification and  patchy areas of consolidation are noted throughout this region.     There is a large right pleural effusion. Nodular areas of groundglass  opacification is seen within the right upper lobe, right middle lobe and  right lower lobe. Volume loss at the base of the right lower lobe is  noted.     There is evidence of prior left mastectomy. No evidence for axillary  adenopathy is appreciated. The visualized  soft tissue structures of the  upper abdomen have a normal appearance.       Impression:       1. Moderately large pericardial effusion as described.  2. Moderate-sized left pleural effusion with considerable consolidation  and volume loss throughout the left lung. The imaging features suggest a  combination of atelectasis, pneumonia and likely metastatic disease  within the left lung.  3. Large right pleural effusion.  4. Groundglass opacities throughout the right upper lobe and right lower  lobe which may represent pulmonary metastases or an atypical pneumonia,  however pulmonary metastases are favored.     This report was finalized on 6/4/2017 4:05 PM by Dr. Devon Pastrana MD.       XR Chest 1 View [[id number]] Collected:  06/05/17 0853     Updated:  06/05/17 1241    Narrative:       HISTORY: 56-year-old female with metastatic breast cancer, pleural  effusions, shortness of breath, left PleurX catheter, now 1 day status  post right thoracentesis.     PORTABLE AP ERECT CHEST DATED 06/05/2017 AT 0523 HOURS.     FINDINGS: When compared to PA and lateral chest radiographs of  06/02/2017 and CT chest exam of 06/03/2017, there is again subtotal  opacification of the left chest with some modest patchy aeration of left  upper lung parenchyma present. The left PleurX catheter is again  demonstrated. The right subclavian port catheter remains and terminates  in the distribution of superior vena cava. The cardiac silhouette is  largely obscured but appears likely top normal to mildly enlarged  caliber, compatible with demonstration of pericardial effusion on CT  exam of 06/03/2017. Some trace residual right pleural fluid versus  pleural thickening at the lateral right pleural stripe and right  costophrenic angle remain. Monitoring lead wires are present. Oxygen  cannula tubing is present about the neck and right shoulder. No large  pneumothorax is seen. There may be a 1 mm trace superolateral and apical  right pneumothorax.  A tiny air bubble at the left apex projecting  adjacent to the PleurX catheter is noted.     CONCLUSION: Decreased right pleural fluid with minimal if any right  apical pneumothorax. Tiny trace of air lucency at the left apex with  superimposed PleurX catheter noted. There is again subtotal  opacification of the left chest and apparent enlargement of cardiac  silhouette as discussed above.     This report was finalized on 6/5/2017 12:38 PM by Dr. Moe Coon MD.       XR Chest PA & Lateral [[id number]] Collected:  06/05/17 1708     Updated:  06/05/17 1715    Narrative:       XR CHEST PA AND LATERAL-     HISTORY: Female who is 56 years-old,  right sided pain     TECHNIQUE: Frontal and lateral views of the chest     COMPARISON: 06/05/2017     FINDINGS: Stable appearing right chest port, left chest tube. Heart size  is difficult to characterize as a result of obscuration of the left  cardiac margin. Opacification of the left hemithorax appears slightly  less dense. Minimal right pleural effusion is suggested. No  pneumothorax. Otherwise stable.       Impression:       Slightly less dense opacification of the left hemithorax.  Otherwise, no significant change.     This report was finalized on 6/5/2017 5:12 PM by Dr. Graham Laureano MD.       XR Chest 1 View [[id number]] Collected:  06/07/17 1846     Updated:  06/07/17 1846    Narrative:       AP CHEST 06/07/2017.     HISTORY: Chest pain.     COMPARISON: Previous study dated 06/05/2017.     FINDINGS: There is volume loss of the left hemithorax with diffuse  infiltrate and probable pleural fluid in the left hemithorax. Left chest  tube terminates in the left apex. There may be some minimal atelectasis  or infiltrate in the right lung base. The right costophrenic sulcus has  cleared somewhat since the 05/05/2017 study. Mediport catheter is seen  in good position.     No pneumothorax is seen..       Impression:       1. Slight clearing of the right costophrenic sulcus  since the previous  study of 06/05/2017. Chest otherwise appears essentially unchanged.  Please see additional dictation for the CT of the chest from 06/03/2017.               Disposition:    Home    Patient Instructions: See After Visit Summary for Medications      Additional Instructions for the Follow-ups that You Need to Schedule     Ambulatory Referral to Home Health    As directed    Face to Face Visit Date:  6/7/2017   Follow-up Provider for Plan of Care?:  I treated the patient in an acute care facility and will not continue treatment after discharge.   Follow-up Provider:  ANSHUL MACIAS   Reason/Clinical Findings:  left malignant pleural effusion needing drainage via PleuRx   Describe mobility limitations that make leaving home difficult:  Weakness, unable to leave the house without help from another person   Nursing/Therapeutic Services Requested:  Skilled Nursing   Skilled nursing orders:  O2 instruction Comment - PleuRx   Frequency:  1 Week 1           Additional information on Labs and Follow-ups:      See Dr. Perez in 1 week.              Follow-up Information     Follow up with [facility] .    Specialty:  Home Health Services    Contact information:    [address] [address] [phone]        Follow up with Ted Manley MD Follow up in 1 week(s).    Specialties:  Hematology and Oncology, Oncology, Hematology    Contact information:    [address] [facility] [address]  [place] [id number] [phone]          Follow up with [facility] .    Specialty:  Home Health Services    Contact information:    [address] [address]          Discharge Order     Start     Ordered    06/07/17 1659  Discharge patient  Once     Expected Discharge Date:  06/07/17    Discharge Disposition:  Home or Self Care        06/07/17 1702        Follow-up Information      Follow up with [facility] .    Specialty:  Home Health Services    Contact information:    [address]  [place] [id number] [phone]        Follow up with Ted Manley MD Follow up in 1 week(s).    Specialties:  Hematology and Oncology, Oncology, Hematology    Contact information:    [address]  [facility] [address]  [phone]          Follow up with [facility] [place] [facility] [place] .    Specialty:  Home Health Services    Contact information:    [address] [facility] [address]  [facility] [address]            Total time spent discharging patient including evaluation,post hospitalization follow up,  medication and post hospitalization instructions and education total time exceeds 30 minutes.    Signed:  Chester Lynne MD  6/8/2017  10:03 AM      Much of this encounter note is an electronic transcription/translation of spoken language to printed text. The electronic translation of spoken language may permit erroneous, or at times, nonsensical words or phrases to be inadvertently transcribed; Although I have reviewed the note for such errors, some may still exist

## 2017-06-08 NOTE — PROGRESS NOTES
Continued Stay Note  [facility]     Patient Name: Yury Mott  MRN: [id number]  Today's Date: 6/8/2017    Admit Date: 6/2/2017          Discharge Plan       06/08/17 1034    Final Note    Final Note Orders received to dc home. Call placed to [facility]  to inform of dc today. Family to transport per private auto. Has own portable O2 tank. No additional needs noted.              Discharge Codes       06/08/17 1012    Discharge Codes    Discharge Codes 06  Discharged/transferred to home under care of organized home health service organization in anticipation of skilled care        Expected Discharge Date and Time     Expected Discharge Date Expected Discharge Time    Jun 7, 2017             Jazz Zeng RN

## 2017-06-08 NOTE — PLAN OF CARE
Problem: Patient Care Overview (Adult)  Goal: Plan of Care Review  Outcome: Ongoing (interventions implemented as appropriate)    06/08/17 0811   Coping/Psychosocial Response Interventions   Plan Of Care Reviewed With patient   Patient Care Overview   Progress no change   Outcome Evaluation   Outcome Summary/Follow up Plan pt eager to get home this a.m. slept through the night, no complaints.          Problem: Respiratory Insufficiency (Adult)  Goal: Acid/Base Balance  Outcome: Ongoing (interventions implemented as appropriate)  Goal: Effective Ventilation  Outcome: Ongoing (interventions implemented as appropriate)    Problem: Fall Risk (Adult)  Goal: Absence of Falls  Outcome: Ongoing (interventions implemented as appropriate)    Problem: Infection, Risk/Actual (Adult)  Goal: Infection Prevention/Resolution  Outcome: Ongoing (interventions implemented as appropriate)    Problem: Pain, Acute (Adult)  Goal: Acceptable Pain Control/Comfort Level  Outcome: Ongoing (interventions implemented as appropriate)

## 2017-06-14 NOTE — TELEPHONE ENCOUNTER
Patient called asking if she can have a chest x ray tomorrow when she is in for her appt.  States she is worried about her pluerx cath.  Reviewed with Bren AREVALO, will go ahead and add on to Kaylan's schedule.  Instructed patient to go ahead and call Dr. Arriaga as well.  Patient v/u.

## 2017-06-15 NOTE — PROGRESS NOTES
Subjective   REASONS FOR FOLLOWUP:   1. T3N2M1,  ER/FL negative, HER-2 positive breast cancer.   2. Biopsy-proven isolated   liver metastasis. Plan to do aggressive chemotherapy with TCH/Perjeta and reevaluate the liver after 3 cycles and continue 6 cycles of chemotherapy followed by Perjeta, Herceptin indefinitely as long as there is no recurrence.   3. Excellent response to 2 cycles of TCH/Perjeta.   4. Day 8 Herceptin missed cycle 2 because of anal fissure which needed to be operated on.   5. Near complete resolution with 4 cycles of TCH/Perjeta.   6. No further improvement, but no visible tumor in the chest and stable, barely detectable nodu le in the liver. Perjeta, Herceptin by themselves continued for now, with plans for surgery and radiation.   7. Negative PET scan after 6 cycles of TCH/Perjeta. Surgery on the left breast and axilla planned.   8. At the time of surgery, 12/31/2014, the patient h ad [id number] disease. The decision was made to do radiation to the axilla and supraclavicular areas and the liver lesion. Continue Perjeta and Herceptin indefinitely until there are signs of progression.   9. Skin lesion in left chest wall positive breast canc er. CT scans of chest, abdomen and pelvis showed no new disease on 02/10/2015. Radiation to the chest wall. Herceptin and Perjeta to continue until there is clear progression.   10. Stereotactic day radiation to solitary brain lesion completed. The patient remains on a Decadron taper.   11. Isolated brain met, irradiated on 07/10/2015 with focused radiation.   12. Progressive pulmonary metastasis and left supraclavicular nodes and brain met on 8/9/2016 switched to Kadcyla  13. Progressive disease in lymph nodes on Kadcyla switched to Tykerb and Xeloda in 1/17  14. Progressive disease noted on CT 3/21. Axillary lymph node biopsied, disease continues to be Her-2 positive.  15. Initiation of Abraxane/Herceptin 4/14/17.       History of Present Illness     The  patient is a pleasant  56 y.o.  with metastatic HER-2 positive breast cancer to brain and bones and chest comes in today for hospital follow-up.  The patient was most recently admitted 6/2/2017 through 6/8/2017.  She was initially admitted for shortness of breath and chest pain.  She does have a known left malignant pleural effusion with a Pleurx catheter in place.  Additionally, she was found to have a pericardial effusion, and right pleural effusion.  She did undergo a right ultrasound-guided thoracentesis with 850 ml of bloody fluid removed.  Additionally, RAZ Porter placed Activase in the left Pleurx catheter.  She was unable to remove 350 ml from the left.  In regards to her chest pain, this did resolve.  Initial plans to undergo pericardiocentesis were canceled, as the pleural effusion was noted to be quite small on echocardiogram, the patient was asymptomatic.  She did receive cycle 3 day 8 of Abraxane while inpatient 6/6/2017. She was discharged 6/8/2017.   Returning today, 6/15/2017, the patient has continued to struggle with drainage from her left Pleurx catheter.  Since discharge, she has not been able to draining any fluid from her catheter.  She continues to have mild shortness of breath, particularly with conversation or exertion.  She does utilize 2 L nasal cannula.  Thankfully, she denies any chest pain.  She does feel like her dry cough is slightly worse over the past 2 days.  She denies any fevers or chills.  She denies any chest pain with inspiration.  She continues to struggle with fatigue and weight loss, which is been ongoing issues for the patient.    PAST MEDICAL HISTORY: Negative except for anemia intermittently which responded to iron.   Active Ambulatory Problems     Diagnosis Date Noted   • Breast cancer metastasized to brain 02/08/2016   • Breast cancer metastasized to liver 02/08/2016   • Encounter for long-term (current) use of high-risk medication 08/09/2016   • Fitting and  adjustment of vascular catheter 03/15/2017   • Intractable pain 2017   • Nausea and vomiting 2017   • Malignant neoplasm metastatic to left lung 2017   • Leukocytopenia 2017   • Anemia associated with chemotherapy 2017   • Dyspnea, unspecified 2017   • Hyponatremia syndrome 2017   • Bilateral pleural effusion 2017   • Chemotherapy induced neutropenia 2017   • Malignant neoplasm of other specified sites of female breast 2017   • Cancer-related pain 2017   • Pleuritic chest pain 2017   • Pericardial effusion without cardiac tamponade 2017     Resolved Ambulatory Problems     Diagnosis Date Noted   • No Resolved Ambulatory Problems     Past Medical History:   Diagnosis Date   • Anemia    • Breast cancer    • Liver metastasis    • Metastasis to brain        PAST SURGICAL HISTORY: None.     OB-GYN HISTORY: Menarche at age 15, menopause .  2, para 2. First childbirth was at age 31. She breast-fed her first child for 4 months, but not her second one. She has not been on hormone replacement.     HEMATOLOGIC/ONCOLOGIC HISTORY:   The patient is a 54-year-old  with [employer] who noted a mass in her right breast recently and immediately realized that it is probably a malignancy and went to see her family doctor who sent her for a mammogram and ultrasound. Mammogram and ultrasound confirmed an irregular mass in the 9 o'clock position of the left breast with an additional smaller area a mass in the subareolar tissue. Left breast ultrasound revealed 2.3 x 1.9 cm solid mass at the 9 o'clock position and a solid mass in the subareolar region, measuring 6 x 5 x 5, and also an enlarged lymph node measuring 2.3 x 2 in the left axilla. The patient underwent FNA of all three lesions and the pathology report showed the two lesions in the breast to be high grade invasive ductal car cinoma, grade 3, estrogen/progesterone receptors negative,  HER-2 positivity was reported but I am not sure if both lesions were tested. Axillary lymph node cytology was positive for malignant cells.    The patient was seen on 07/03/2014 with a normal bone sc an and echo which shows EF of 50% although it looked visually higher. CT scans of the chest, abdomen, and pelvis unfortunately showed the obvious breast mass in the left breast with enlarged lymph nodes in the axilla. In addition, there was a 6 mm pleural based nodule of the left lung base and a 1.5 cm low attenuation lesion in the hepatic segment, suspicious for metastatic disease. The decision was made to do a PET scan and biopsy of the liver lesion and tailor therapy according to the results. We discu ssed also that if she has an isolated metastasis in the liver, we would consider being aggressive and proceeding with treatment, as previously outlined, followed by a response of targeted therapy at the liver at the conclusion of treatment.    Liver biopsy s howed metastatic breast cancer and based on the fact this was an isolated metastasis we presented her at conference and opted to go for aggressive treatment with TCH/Perjeta, re-evaluate the liver lesion and if there is a complete response to continue wi th Arimidex, Perjeta and Herceptin as long as she is tolerating it well.    The patient had significant diarrhea toxicity with cycle 1. Neulasta added for cycle 2 with IV fluid support on day 5 and scans planned after cycle 2. Hemorrhoids being addressed by Dr. Molly Lopez.    The patient was seen on 08/21/2014 with scans that showed dramatic improvement in the primary breast tumor and the left axillary node and subpectoral nodes and the isolated liver metastasis, which is 50% smaller. Plan to continue for 6 do ses with followup scans and local therapy to the liver and breast if tolerated.    The patient was seen on 10/01/2014 with CAT scans that showed almost complete resolution of her single liver metastasis and  is now 4 mm in size. The breast mass was not commen lauren on, suggesting that this is also essentially unremarkable and no significant axillary adenopathy appreciated. The decision was made to finish 6 cycles of TCH/Perjeta and have surgery with left mastectomy and dissection followed by focus radiation to t he liver lesion and chest with Herceptin and Perjeta continued in a treatment setting.    Patient seen through triage in the infusion center on 11/06/2014 with reports of fever. Blood cultures obtained, urinalysis obtained and chest x-ray revealed negative results. She was started on Augmentin 875 mg twice daily for 7 days.   Patient seen on 11/13/2014 after 6 cycles of TCH/Perjeta. There is no tumor visible on the chest wall, breasts, axillae or supraclavicular areas. There is a 6 mm lung nodule, which is unc hanged and stable from pretreatment and very likely benign. The liver lesion is just barely perceptible and unchanged from the last scan. Decision was made to continue with Perjeta and Herceptin by themselves and evaluate for surgery and radiation therea fter. Possible directive therapy to the liver if there has been a complete pathological response in the chest and axilla.    Patient seen on 01/20/2015 with pathology report from her surgery showing just residual ductal carcinoma in situ with no invasive felix or left in the breast but 2 of 3 sentinel nodes positive for metastatic disease 2 mm deposits including the node with the clip in it. Decision was made to radiate the chest wall and liver lesion and continue Perjeta/Herceptin.    The patient was seen on 0 2/10/2015 with CT scans of chest, abdomen and pelvis which showed no evidence of progressive disease. There is a stable 6 mm lung nodule that is unchanged and a stable site of metastasis in the liver which is unchanged and no new evidence of metastatic d isease. Biopsy of the chest wall lesion showed breast cancer with HER-2 positivity. The decision was  made, because there is no other progression outside the local area, to continue with radiation to the chest wall, including axillary, subpectoral, supracl a vicular nodes and the skin lesion, and continue Perjeta and Herceptin until there is clinical progression outside the chest wall. We do realize at this point that there is rapid progression in the chest wall and it is very unlikely she is going to be cure d from her disease.    The patient was seen on 05/05/2015. Echocardiogram was stable. PET scan shows consolidation of the left upper lobe at the site of her radiation, which consists of radiation pneumonitis. There is a very low level activity in the chest wall, probably corresponding to her skin metastasis in the radiated field and the liver metastasis has resolved. The decision was made to continue with Herceptin/Perjeta for the time being with repeat scans in 4 months.    The patient had an MRI dated 06/18/ 2015 which showed an isolated 1 cm metastasis in the left inferior temporal gyrus. Focused radiation was given with good results. Patient seen on 07/28/2015 clinically doing well. She had a viral GI infection 2 days prior which has resolved with some re sultant mild leukopenia and thrombocytopenia. Echo showed ejection fraction of 52% but it was done at another facility and we will have it reviewed and continue with treatment for now, with scans planned in 3 weeks.   The patient was seen on 08/18/2015 wit h CT scans of chest, abdomen, and pelvis that show essentially stable disease with no new metastasis and some small nodules in the chest which are unchanged and no liver metastasis. Perjeta and Herceptin continue with plans for bone scan and MRI of the b rain to follow up on a brain in 3 weeks and mammogram and ultrasound ordered for some minimal swelling of the breast with no obvious masses.    Patient seen on 09/08/2015 with negative bone scan and MRI showing further resolution of her isolated brain  met. Th e decision was made to continue on current treatment for the time being and follow up echoes every 4 months. Borderline iron depletion with good improvement in symptoms with Feraheme.    Patient seen on 12/22/2015 with a brain MRI that shows 2 stable resid ual small lesions, post radiation in her brain. CT abdomen and pelvis and chest CT are negative for metastatic disease or obvious bony lesions. Perjeta/Herceptin continued with plans for echocardiograms every 4 months.     Patient seen on 02/02/2016, clinically stable. Echocardiogram dated 01/07/2016 shows an EF of 62%. Patient is having slow recovery from upper respiratory infection and we have given her a steroid pack and continued treatment, as she had scans just a month ago that were very good    Progressive pulmonary metastasis and left supraclavicular nodes and brain met on 8/9/2016 switched to Kadcyla   Unfortunately as we expected her CAT scans show progressive disease in the left supraclavicular area and mediastinum as well as some small pulmonary nodules and no disease in the abdomen.  Her brain MRI also shows increase in size of the solitary lesion in the left temporal occipital junction since October with some surrounding edema which is very asymptomatic      At this point clearly we need a change in systemic treatment and some local treatment for her solitary brain met.  Refer to radiation therapy for focused radiation and will switch to Tykerb and Xeloda.  Because of her borderline blood counts I have started was 5 pills of Xeloda daily 2 weeks on and 2 weeks off plus the Tykerb and she will have chemotherapy education for this next couple of days.  She will start the Tykerb next week and hold off on the Xeloda until her counts are better and the radiation is completed in 2 weeks   Progressive disease in lymph nodes and one new brain metastases on Kadcyla switched to Tykerb and Xeloda in 1/17 plus focused radiation to the brain  patient is a  56 yo female with metastatic HER-2 positive breast cancer ER/WA negative with progression in the brain and nodes on Kadcyla.  She is receiving focused radiation to her brain metastases with concurrent Tykerb and is just finished one two-week cycle of Xeloda 5 pills a day which she tolerated well.  She comes in today obviously concerned because her left supraclavicular nodes are increasing significantly in size despite being on Xeloda and Tykerb.  In addition she has a acneiform eruption on her face which is very  bothersome from the Tykerb.  It is clear that she is not responding to these medications and she is finally agreeable to a clinical trial.  I called Dr. Betty Rivera at [facility] and they currently do not have a trial that she is eligible for.  We do have the match trial here in our institution and we have tentatively given her the consent form for this.  I've elected to rebiopsy her supra-clavicular node that this point to see if they are still HER-2 positive regardless of the study but if she agrees to the study we could use this tissue to do the testing for targeted option on the match trial.   Supraclavicular nodes were rebiopsied and showed persistent HER-2 positivity and restarted onNAvelbine/Herceptin but after 2 doses she had severe pain with each infusion and had clinical evidence of progressive disease she was hospitalized and had a Pleurx catheter placed on the left.  She was switched to Abraxane and Herceptin and is tolerating this well    SOCIAL HISTORY: She is  and lives with her . She works as a  for [employer]. She does not smoke or drink. She has no risk factors for HIV or drug history.     FAMILY HISTORY: Parents are both alive, father at 95 years and mother at 81 years. She has two sisters and one half-brother. Maternal grandmother had uterine cancer in 50s. No breast or ovarian cancer in the family.     I have reviewed the patient's medical history in detail and  updated the computerized patient record.    Review of Systems   Constitutional: Positive for activity change, fatigue and unexpected weight change. Negative for fever.   HENT: Negative for mouth sores and nosebleeds.    Eyes: Negative for visual disturbance.   Respiratory: Positive for cough and shortness of breath. Negative for chest tightness.    Cardiovascular: Negative for chest pain, palpitations and leg swelling.   Gastrointestinal: Negative for constipation, diarrhea and nausea.   Genitourinary: Negative for difficulty urinating.   Musculoskeletal: Negative for arthralgias.   Skin: Negative for color change and rash.   Neurological: Negative for weakness and light-headedness.   Psychiatric/Behavioral: The patient is not nervous/anxious.    All other systems reviewed and are negative.       Current Outpatient Prescriptions on File Prior to Visit   Medication Sig Dispense Refill   • bisacodyl (DULCOLAX) 5 MG EC tablet Take 10 mg by mouth Daily As Needed for Constipation (PT STS TAKES 2-3 PPRN).     • fentaNYL (DURAGESIC) 25 MCG/HR patch Place 1 patch on the skin Every 72 (Seventy-Two) Hours. (Patient taking differently: Place 1 patch on the skin Every 72 (Seventy-Two) Hours. DUE TO BE CHANGED 06/03/2017) 10 patch 0   • Glycerin-Hypromellose- (ARTIFICIAL TEARS) 0.2-0.2-1 % solution ophthalmic solution Administer 1 drop to both eyes Every 1 (One) Hour As Needed for Dry Eyes. 1 bottle 2   • lactulose (CHRONULAC) 10 GM/15ML solution Take 30 mL by mouth Daily. (Patient taking differently: Take 20 g by mouth Daily As Needed.) 300 mL 1   • metoprolol tartrate (LOPRESSOR) 25 MG tablet Take 1 tablet by mouth Every 12 (Twelve) Hours. 60 tablet 3   • ondansetron (ZOFRAN) 8 MG tablet Take 1 tablet by mouth Every 8 (Eight) Hours As Needed for nausea or vomiting. 30 tablet 2   • prochlorperazine (COMPAZINE) 10 MG tablet Take 10 mg by mouth Every 6 (Six) Hours As Needed for Nausea or Vomiting.     • promethazine  "(PHENERGAN) 12.5 MG tablet Take 1 tablet by mouth Every 6 (Six) Hours As Needed for Nausea or Vomiting. 40 tablet 1   • Scopolamine (TRANSDERM-SCOP, 1.5 MG,) 1.5 MG/3DAYS patch Place 1 patch on the skin Every 72 (Seventy-Two) Hours. (Patient taking differently: Place 1 patch on the skin Every 72 (Seventy-Two) Hours. DUE TO BE CHANGED 06/03/2017) 24 patch 3   • sennosides-docusate sodium (SENOKOT-S) 8.6-50 MG tablet Take 2 tablets by mouth Daily. 60 tablet 5     Current Facility-Administered Medications on File Prior to Visit   Medication Dose Route Frequency Provider Last Rate Last Dose   • [DISCONTINUED] heparin flush (porcine) 100 UNIT/ML injection 500 Units  500 Units Intravenous PRN Ted Manley MD   500 Units at 06/15/17 1412   • [DISCONTINUED] sodium chloride 0.9 % flush 10 mL  10 mL Intravenous PRN Ted Manley MD   10 mL at 06/15/17 1411         ALLERGIES:    Allergies   Allergen Reactions   • Latex Rash       Objective      Vitals:    06/15/17 1441   BP: 110/68   Pulse: (!) 122   Resp: 16   Temp: 98.5 °F (36.9 °C)   SpO2: 98%  Comment: at rest   Weight: 119 lb 3.2 oz (54.1 kg)   Height: 66.73\" (169.5 cm)   PainSc: 0-No pain     Current Status 6/15/2017   ECOG score 1     Physical Exam    GENERAL:  Thin, chronically ill appearing female in no acute distress, seated in a wheelchair wearing nasal cannula.   SKIN:  Warm, dry without rashes, purpura or petechiae.  HEAD:  Normocephalic.  EYES:  Pupils equal, round.  EOMs intact.  Conjunctivae normal.  CHEST:  Right lung clear to auscultation, left lung breath sounds decreased, little air movement. Left PleurX in place without signs of infection. No adventitious breath sounds. Wearing 2L NC.  CARDIAC: Tachycardia with regular rhythm without murmurs. Normal S1,S2.  EXTREMITIES:  No clubbing, cyanosis or edema.  NEUROLOGICAL: global weakness  PSYCHIATRIC:  Normal affect and mood.      RECENT LABS:  Lab Results   Component Value Date    WBC 5.92 06/15/2017    " HGB 11.0 (L) 06/15/2017    HCT 34.8 06/15/2017    MCV 89.5 06/15/2017     (H) 06/15/2017     IMAGING: CXR images reviewed today  FINDINGS: There is a Mediport catheter in position, the right lung is  clear. There is a left-sided chest/drainage tube in position similar to  the previous examination. There is left-sided pleural effusion and/or  thickening with consolidation throughout the aerated left lung. The  degree of airspace disease appear slightly less pronounced compared to  the previous examination.      There is cardiac enlargement. There is absence of the left breast  shadow. No other interval change has occurred.    Assessment/Plan   1. Metastatic ER/MD negative HER-2 positive breast cancer to brain and liver and chest wall.  Progressed on Tykerb and Xeloda. Intolerant to Navelbine and Herceptin switched to Abraxane and Herceptin on 4/17/17. Next due to begin cycle 4 6/20/2017 after evaluation by Dr. Manley    2. Pleural effusions. Right thoracentesis 6/4/17; left pleurX drainage planned 3x/wk. Activase was instilled into PleurX catheter on 6/2/17. Unable to drain since discharge. Chest x-ray performed in the office today revealed resolution of right pleural effusion.  Left pleural effusion remains present.   I spoken with CHI David at Dr. Arriaga office regarding the need for Activase. It is my understanding,  it has been communicated with both Dr. Arriaga, and RAZ Porter the current situation, and results of the most recent chest x-ray performed in the office today.  The patient will present to their our office tomorrow for evaluation, and instillation of Activase into the Pleurx catheter.  Symptomatically, the patient currently remains stable.    3. Pericardial effusion. Evaluated by Dr. Perez while inpatient.  Prior to discharge, echocardiogram revealed improvement, the patient did not require pericardiocentesis.  She remains asymptomatic    4.  Pain secondary malignancy.  Well  controlled with Duragesic 25 mcg patch every 72 hours.    5. Narcotic-induced constipation.  Currently controlled with Dulcolax and lactulose as needed.    I spoken with CHI David at Dr. Arriaga office regarding the need for Activase. It is my understanding,  it has been communicated with both Dr. Arriaga, and RAZ Porter the current situation, and results of the most recent chest x-ray performed in the office today.  The patient will present to their our office tomorrow for evaluation, and instillation of Activase into the Pleurx catheter.  Symptomatically, the patient currently remains stable.    Plan   1.  Follow up tomorrow at 1:00 with RAZ Porter for Activase instillation into left PleurX catheter.  2.   Continue Duragesic patch 25µg every 3 days  3.  Return 6/20/2017 for reevaluation by Dr. Manley in anticipation of cycle 4 Abraxane/Herceptin.  4.  Continue supportive care including bowel regimen, and nausea medication.    RAZ Miner  6/15/2017      CC:

## 2017-06-16 NOTE — PROGRESS NOTES
Patient ID: Yury Mott is a 56 y.o. female is here today for follow-up.    Subjective     Chief Complaint   Patient presents with   • Follow-up     F/U VISIT TODY FOR ACTIVASE INSERTION       History of Present Illness    Yury Mott presents to the office today due to no drainage from left PleurX catheter since discharge from the hospital on 6/8/17.  During her last hospitalization, she had no drainage from PleurX and she had worsening SOA.  Activase was administered and her PleurX was drained the next day of 350 ml of fluid.  The PleurX then drained 300 ml on Monday, then went to around 100 ml then nothing. CXR at time of discharge continued to show a left pleural effusion.  She presents to the office for administration of Activase again into PleurX catheter.  She denies any worsening shortness of breath like she has had before when the PleurX was not draining but continues to get short of breath with exertion and uses oxygen continuously at 2L/M. She also has a dry cough.   She denies any new concerns such as fever or hemoptysis.        Patient Active Problem List   Diagnosis   • Breast cancer metastasized to brain   • Breast cancer metastasized to liver   • Encounter for long-term (current) use of high-risk medication   • Fitting and adjustment of vascular catheter   • Intractable pain   • Nausea and vomiting   • Malignant neoplasm metastatic to left lung   • Leukocytopenia   • Anemia associated with chemotherapy   • Dyspnea, unspecified   • Hyponatremia syndrome   • Bilateral pleural effusion   • Chemotherapy induced neutropenia   • Malignant neoplasm of other specified sites of female breast   • Cancer-related pain   • Pleuritic chest pain   • Pericardial effusion without cardiac tamponade     Past Medical History:   Diagnosis Date   • Anemia     intermittent which responded to iron.    • Breast cancer     Left, ER/MO negative, HER-2 positive    • Liver metastasis     biopsy proven    • Metastasis to  brain     irradiated on 7/10/2015 w/ focus radiation     Past Surgical History:   Procedure Laterality Date   • CARDIAC CATHETERIZATION N/A 6/5/2017    Procedure: Pericardiocentesis;  Surgeon: Hemant Perez MD;  Location:  [facility];  Service:    • CARDIAC CATHETERIZATION  6/5/2017    Procedure: Case Abort;  Surgeon: Hemant Perez MD;  Location:  [facility];  Service:    • MASTECTOMY Left 12/30/2014   • PLEURAL CATHETER INSERTION Left 5/1/2017    Procedure: PLEURX CATHETER INSERTION;  Surgeon: Asael Arriaga MD;  Location:  [facility];  Service:    • PORTACATH PLACEMENT  06/2014     Family History   Problem Relation Age of Onset   • Uterine cancer Maternal Grandmother      mid 50's   • Cancer Other    • Other Other      Cardiac disorder   • Cancer Mother 70     Face   • Anemia Mother    • Heart disease Father      Social History     Social History   • Marital status:      Spouse name: Evaristo   • Number of children: 2   • Years of education: College     Occupational History   •  [employer]     Social History Main Topics   • Smoking status: Never Smoker   • Smokeless tobacco: Former User   • Alcohol use No   • Drug use: No   • Sexual activity: Defer     Other Topics Concern   • Not on file     Social History Narrative    She is  and lives with her . She works as a  for [employer].        Current Outpatient Prescriptions:   •  bisacodyl (DULCOLAX) 5 MG EC tablet, Take 10 mg by mouth Daily As Needed for Constipation (PT STS TAKES 2-3 PPRN)., Disp: , Rfl:   •  fentaNYL (DURAGESIC) 25 MCG/HR patch, Place 1 patch on the skin Every 72 (Seventy-Two) Hours. (Patient taking differently: Place 1 patch on the skin Every 72 (Seventy-Two) Hours. DUE TO BE CHANGED 06/03/2017), Disp: 10 patch, Rfl: 0  •  Glycerin-Hypromellose- (ARTIFICIAL TEARS) 0.2-0.2-1 % solution ophthalmic solution, Administer 1  drop to both eyes Every 1 (One) Hour As Needed for Dry Eyes., Disp: 1 bottle, Rfl: 2  •  lactulose (CHRONULAC) 10 GM/15ML solution, Take 30 mL by mouth Daily. (Patient taking differently: Take 20 g by mouth Daily As Needed.), Disp: 300 mL, Rfl: 1  •  ondansetron (ZOFRAN) 8 MG tablet, Take 1 tablet by mouth Every 8 (Eight) Hours As Needed for nausea or vomiting., Disp: 30 tablet, Rfl: 2  •  predniSONE (DELTASONE) 10 MG tablet, TAKE 1 TABLET BY MOUTH DAILY., Disp: , Rfl: 3  •  prochlorperazine (COMPAZINE) 10 MG tablet, Take 10 mg by mouth Every 6 (Six) Hours As Needed for Nausea or Vomiting., Disp: , Rfl:   •  promethazine (PHENERGAN) 12.5 MG tablet, Take 1 tablet by mouth Every 6 (Six) Hours As Needed for Nausea or Vomiting., Disp: 40 tablet, Rfl: 1  •  Scopolamine (TRANSDERM-SCOP, 1.5 MG,) 1.5 MG/3DAYS patch, Place 1 patch on the skin Every 72 (Seventy-Two) Hours. (Patient taking differently: Place 1 patch on the skin Every 72 (Seventy-Two) Hours. DUE TO BE CHANGED 06/03/2017), Disp: 24 patch, Rfl: 3  •  sennosides-docusate sodium (SENOKOT-S) 8.6-50 MG tablet, Take 2 tablets by mouth Daily., Disp: 60 tablet, Rfl: 5    Current Facility-Administered Medications:   •  alteplase ((CATHFLO/ACTIVASE)) injection 2 mg, 2 mg, Intracatheter, Once, Yokasta N Head, APRN  Allergies   Allergen Reactions   • Latex Rash         Review of Systems   Constitution: Positive for malaise/fatigue.   HENT: Negative.    Eyes: Negative.    Cardiovascular: Negative.    Respiratory: Positive for cough and shortness of breath.    Endocrine: Negative.    Hematologic/Lymphatic: Negative.    Skin: Negative.    Musculoskeletal: Negative.    Gastrointestinal: Negative.    Genitourinary: Negative.    Neurological: Negative.    Psychiatric/Behavioral: Negative.        Vitals:    06/16/17 1255   BP: 108/62   Pulse: 110   Resp: 16   SpO2: 98%       Objective     Physical Exam   Constitutional: She is oriented to person, place, and time. Vital signs are  normal. She appears ill.   thin   HENT:   Head: Normocephalic and atraumatic.   Neck: Normal range of motion. Neck supple.   Cardiovascular: Regular rhythm, normal heart sounds and intact distal pulses.  Tachycardia present.    No murmur heard.  Pulmonary/Chest: Effort normal. She has decreased breath sounds in the right lower field, the left middle field and the left lower field. She has no wheezes. She has no rhonchi. She has no rales. She exhibits no tenderness.   O2 per N/C at 2L/M.  PleurX catheter intact to left chest   Abdominal: Soft. There is no tenderness.   Musculoskeletal: Normal range of motion. She exhibits no edema or tenderness.   Required wheelchair to be brought to office visit today but was easily able to transfer from exam table to /c with minimal assistance.     Neurological: She is alert and oriented to person, place, and time.   Skin: Skin is warm and dry. No rash noted. No cyanosis or erythema.   Psychiatric: She has a normal mood and affect. Her behavior is normal.       Assessment/Plan   Diagnoses and all orders for this visit:    Malignant pleural effusion  -     alteplase ((CATHFLO/ACTIVASE)) injection 2 mg; 2 mL by Intracatheter route 1 (One) Time.    SUMMARY  Yury Mott condition has remained stable since discharge from the hospital except for the PleurX catheter not draining again.  Her PleurX catheter has only had Activase administered one time previously and was only left in for 24 hours due to worsening shortness of breath.  I administered Activase 2 mg into PleurX catheter today and instructed to wait until Monday to drain unless she has worsening of symptoms before then, then she may drain prn for SOA.  If PleurX catheter starts functioning appropriately, will have her return to her previous schedule of draining on Monday, Wednesday, Friday up to 500 ml each time and prn for SOA.  She will follow-up with us as needed for any PleurX catheter concerns or any other problems or  concerns.      Yokasta Luo, APRN  06/16/17  4:25 PM

## 2017-06-20 NOTE — PROGRESS NOTES
Subjective   REASONS FOR FOLLOWUP:   1. T3N2M1,  ER/AL negative, HER-2 positive breast cancer.   2. Biopsy-proven isolated   liver metastasis. Plan to do aggressive chemotherapy with TCH/Perjeta and reevaluate the liver after 3 cycles and continue 6 cycles of chemotherapy followed by Perjeta, Herceptin indefinitely as long as there is no recurrence.   3. Excellent response to 2 cycles of TCH/Perjeta.   4. Day 8 Herceptin missed cycle 2 because of anal fissure which needed to be operated on.   5. Near complete resolution with 4 cycles of TCH/Perjeta.   6. No further improvement, but no visible tumor in the chest and stable, barely detectable nodu le in the liver. Perjeta, Herceptin by themselves continued for now, with plans for surgery and radiation.   7. Negative PET scan after 6 cycles of TCH/Perjeta. Surgery on the left breast and axilla planned.   8. At the time of surgery, 12/31/2014, the patient h ad [id number] disease. The decision was made to do radiation to the axilla and supraclavicular areas and the liver lesion. Continue Perjeta and Herceptin indefinitely until there are signs of progression.   9. Skin lesion in left chest wall positive breast canc er. CT scans of chest, abdomen and pelvis showed no new disease on 02/10/2015. Radiation to the chest wall. Herceptin and Perjeta to continue until there is clear progression.   10. Stereotactic day radiation to solitary brain lesion completed. The patient remains on a Decadron taper.   11. Isolated brain met, irradiated on 07/10/2015 with focused radiation.   12. Progressive pulmonary metastasis and left supraclavicular nodes and brain met on 8/9/2016 switched to Kadcyla  13. Progressive disease in lymph nodes on Kadcyla switched to Tykerb and Xeloda in 1/17  14. Progressive disease noted on CT 3/21. Axillary lymph node biopsied, disease continues to be Her-2 positive.  15. Initiation of Abraxane/Herceptin 4/14/17.       History of Present Illness     The  patient is a pleasant  56 y.o.  with metastatic HER-2 positive breast cancer to brain and bones and chest comes in today for continued chemotherapy.  She's had 2 cycles of Abraxane Herceptin which is tolerated well.  She did have new right pleural effusion during her recent hospitalization but she is clinically much improved and her supraclavicular adenopathy has completely regressed with the treatment and therefore she is having at least a mixed response but feels well enough at this point that she wants to continue treatment.  She had Activase instilled into her Pleurx catheter for days ago and keya 300 cc off yesterday.  Her breathing is stable on 2 L her tachycardia is stable and she stopped the metoprolol was given to her in the hospital because her blood pressure is running low.  Appetite remains very good but she is unable to gain weight and she is frustrated about this and I suggested some dietary supplements  Her pain is well controlled with Duragesic 25 every 3 days with when necessary Percocet  Bowel movements are regular-she has no side effects from the chemotherapy at this point.     PAST MEDICAL HISTORY: Negative except for anemia intermittently which responded to iron.   Active Ambulatory Problems     Diagnosis Date Noted   • Breast cancer metastasized to brain 02/08/2016   • Breast cancer metastasized to liver 02/08/2016   • Encounter for long-term (current) use of high-risk medication 08/09/2016   • Fitting and adjustment of vascular catheter 03/15/2017   • Intractable pain 04/26/2017   • Nausea and vomiting 04/26/2017   • Malignant neoplasm metastatic to left lung 04/27/2017   • Leukocytopenia 04/27/2017   • Anemia associated with chemotherapy 04/27/2017   • Dyspnea, unspecified 04/27/2017   • Hyponatremia syndrome 04/27/2017   • Bilateral pleural effusion 04/27/2017   • Chemotherapy induced neutropenia 04/30/2017   • Malignant neoplasm of other specified sites of female breast 05/09/2017   •  Cancer-related pain 2017   • Pleuritic chest pain 2017   • Pericardial effusion without cardiac tamponade 2017     Resolved Ambulatory Problems     Diagnosis Date Noted   • No Resolved Ambulatory Problems     Past Medical History:   Diagnosis Date   • Anemia    • Breast cancer    • Liver metastasis    • Metastasis to brain        PAST SURGICAL HISTORY: None.     OB-GYN HISTORY: Menarche at age 15, menopause .  2, para 2. First childbirth was at age 31. She breast-fed her first child for 4 months, but not her second one. She has not been on hormone replacement.     HEMATOLOGIC/ONCOLOGIC HISTORY:   The patient is a 54-year-old  with [place] who noted a mass in her right breast recently and immediately realized that it is probably a malignancy and went to see her family doctor who sent her for a mammogram and ultrasound. Mammogram and ultrasound confirmed an irregular mass in the 9 o'clock position of the left breast with an additional smaller area a mass in the subareolar tissue. Left breast ultrasound revealed 2.3 x 1.9 cm solid mass at the 9 o'clock position and a solid mass in the subareolar region, measuring 6 x 5 x 5, and also an enlarged lymph node measuring 2.3 x 2 in the left axilla. The patient underwent FNA of all three lesions and the pathology report showed the two lesions in the breast to be high grade invasive ductal car cinoma, grade 3, estrogen/progesterone receptors negative, HER-2 positivity was reported but I am not sure if both lesions were tested. Axillary lymph node cytology was positive for malignant cells.    The patient was seen on 2014 with a normal bone sc an and echo which shows EF of 50% although it looked visually higher. CT scans of the chest, abdomen, and pelvis unfortunately showed the obvious breast mass in the left breast with enlarged lymph nodes in the axilla. In addition, there was a 6 mm pleural based nodule of the left lung base  and a 1.5 cm low attenuation lesion in the hepatic segment, suspicious for metastatic disease. The decision was made to do a PET scan and biopsy of the liver lesion and tailor therapy according to the results. We discu ssed also that if she has an isolated metastasis in the liver, we would consider being aggressive and proceeding with treatment, as previously outlined, followed by a response of targeted therapy at the liver at the conclusion of treatment.    Liver biopsy s howed metastatic breast cancer and based on the fact this was an isolated metastasis we presented her at conference and opted to go for aggressive treatment with TCH/Perjeta, re-evaluate the liver lesion and if there is a complete response to continue wi th Arimidex, Perjeta and Herceptin as long as she is tolerating it well.    The patient had significant diarrhea toxicity with cycle 1. Neulasta added for cycle 2 with IV fluid support on day 5 and scans planned after cycle 2. Hemorrhoids being addressed by Dr. Molly Lopez.    The patient was seen on 08/21/2014 with scans that showed dramatic improvement in the primary breast tumor and the left axillary node and subpectoral nodes and the isolated liver metastasis, which is 50% smaller. Plan to continue for 6 do ses with followup scans and local therapy to the liver and breast if tolerated.    The patient was seen on 10/01/2014 with CAT scans that showed almost complete resolution of her single liver metastasis and is now 4 mm in size. The breast mass was not commen lauren on, suggesting that this is also essentially unremarkable and no significant axillary adenopathy appreciated. The decision was made to finish 6 cycles of TCH/Perjeta and have surgery with left mastectomy and dissection followed by focus radiation to t he liver lesion and chest with Herceptin and Perjeta continued in a treatment setting.    Patient seen through triage in the infusion center on 11/06/2014 with reports of fever. Blood  cultures obtained, urinalysis obtained and chest x-ray revealed negative results. She was started on Augmentin 875 mg twice daily for 7 days.   Patient seen on 11/13/2014 after 6 cycles of TCH/Perjeta. There is no tumor visible on the chest wall, breasts, axillae or supraclavicular areas. There is a 6 mm lung nodule, which is unc hanged and stable from pretreatment and very likely benign. The liver lesion is just barely perceptible and unchanged from the last scan. Decision was made to continue with Perjeta and Herceptin by themselves and evaluate for surgery and radiation therea fter. Possible directive therapy to the liver if there has been a complete pathological response in the chest and axilla.    Patient seen on 01/20/2015 with pathology report from her surgery showing just residual ductal carcinoma in situ with no invasive felix or left in the breast but 2 of 3 sentinel nodes positive for metastatic disease 2 mm deposits including the node with the clip in it. Decision was made to radiate the chest wall and liver lesion and continue Perjeta/Herceptin.    The patient was seen on 0 2/10/2015 with CT scans of chest, abdomen and pelvis which showed no evidence of progressive disease. There is a stable 6 mm lung nodule that is unchanged and a stable site of metastasis in the liver which is unchanged and no new evidence of metastatic d isease. Biopsy of the chest wall lesion showed breast cancer with HER-2 positivity. The decision was made, because there is no other progression outside the local area, to continue with radiation to the chest wall, including axillary, subpectoral, supracl a vicular nodes and the skin lesion, and continue Perjeta and Herceptin until there is clinical progression outside the chest wall. We do realize at this point that there is rapid progression in the chest wall and it is very unlikely she is going to be cure d from her disease.    The patient was seen on 05/05/2015. Echocardiogram was  stable. PET scan shows consolidation of the left upper lobe at the site of her radiation, which consists of radiation pneumonitis. There is a very low level activity in the chest wall, probably corresponding to her skin metastasis in the radiated field and the liver metastasis has resolved. The decision was made to continue with Herceptin/Perjeta for the time being with repeat scans in 4 months.    The patient had an MRI dated 06/18/ 2015 which showed an isolated 1 cm metastasis in the left inferior temporal gyrus. Focused radiation was given with good results. Patient seen on 07/28/2015 clinically doing well. She had a viral GI infection 2 days prior which has resolved with some re sultant mild leukopenia and thrombocytopenia. Echo showed ejection fraction of 52% but it was done at another facility and we will have it reviewed and continue with treatment for now, with scans planned in 3 weeks.   The patient was seen on 08/18/2015 wit h CT scans of chest, abdomen, and pelvis that show essentially stable disease with no new metastasis and some small nodules in the chest which are unchanged and no liver metastasis. Perjeta and Herceptin continue with plans for bone scan and MRI of the b rain to follow up on a brain in 3 weeks and mammogram and ultrasound ordered for some minimal swelling of the breast with no obvious masses.    Patient seen on 09/08/2015 with negative bone scan and MRI showing further resolution of her isolated brain met. Th e decision was made to continue on current treatment for the time being and follow up echoes every 4 months. Borderline iron depletion with good improvement in symptoms with Feraheme.    Patient seen on 12/22/2015 with a brain MRI that shows 2 stable resid ual small lesions, post radiation in her brain. CT abdomen and pelvis and chest CT are negative for metastatic disease or obvious bony lesions. Perjeta/Herceptin continued with plans for echocardiograms every 4 months.      Patient seen on 02/02/2016, clinically stable. Echocardiogram dated 01/07/2016 shows an EF of 62%. Patient is having slow recovery from upper respiratory infection and we have given her a steroid pack and continued treatment, as she had scans just a month ago that were very good    Progressive pulmonary metastasis and left supraclavicular nodes and brain met on 8/9/2016 switched to Kadcyla   Unfortunately as we expected her CAT scans show progressive disease in the left supraclavicular area and mediastinum as well as some small pulmonary nodules and no disease in the abdomen.  Her brain MRI also shows increase in size of the solitary lesion in the left temporal occipital junction since October with some surrounding edema which is very asymptomatic      At this point clearly we need a change in systemic treatment and some local treatment for her solitary brain met.  Refer to radiation therapy for focused radiation and will switch to Tykerb and Xeloda.  Because of her borderline blood counts I have started was 5 pills of Xeloda daily 2 weeks on and 2 weeks off plus the Tykerb and she will have chemotherapy education for this next couple of days.  She will start the Tykerb next week and hold off on the Xeloda until her counts are better and the radiation is completed in 2 weeks   Progressive disease in lymph nodes and one new brain metastases on Kadcyla switched to Tykerb and Xeloda in 1/17 plus focused radiation to the brain  patient is a 56 yo female with metastatic HER-2 positive breast cancer ER/[place] negative with progression in the brain and nodes on Kadcyla.  She is receiving focused radiation to her brain metastases with concurrent Tykerb and is just finished one two-week cycle of Xeloda 5 pills a day which she tolerated well.  She comes in today obviously concerned because her left supraclavicular nodes are increasing significantly in size despite being on Xeloda and Tykerb.  In addition she has a acneiform  eruption on her face which is very  bothersome from the Tykerb.  It is clear that she is not responding to these medications and she is finally agreeable to a clinical trial.  I called Dr. Betty Rivera at [facility] and they currently do not have a trial that she is eligible for.  We do have the match trial here in our institution and we have tentatively given her the consent form for this.  I've elected to rebiopsy her supra-clavicular node that this point to see if they are still HER-2 positive regardless of the study but if she agrees to the study we could use this tissue to do the testing for targeted option on the match trial.   Supraclavicular nodes were rebiopsied and showed persistent HER-2 positivity and restarted onNAvelbine/Herceptin but after 2 doses she had severe pain with each infusion and had clinical evidence of progressive disease she was hospitalized and had a Pleurx catheter placed on the left.  She was switched to Abraxane and Herceptin and is tolerating this well     The patient was most recently admitted 6/2/2017 through 6/8/2017.  She was initially admitted for shortness of breath and chest pain.  She does have a known left malignant pleural effusion with a Pleurx catheter in place.  Additionally, she was found to have a pericardial effusion, and right pleural effusion.  She did undergo a right ultrasound-guided thoracentesis with 850 ml of bloody fluid removed.  Additionally, RAZ Porter placed Activase in the left Pleurx catheter.  She was unable to remove 350 ml from the left.  In regards to her chest pain, this did resolve.  Initial plans to undergo pericardiocentesis were canceled, as the pleural effusion was noted to be quite small on echocardiogram, the patient was asymptomatic.  She did receive cycle 3 day 8 of Abraxane while inpatient 6/6/2017. She was discharged 6/8/2017.   Returning today, 6/15/2017, the patient has continued to struggle with drainage from her left Pleurx  catheter.  Since discharge, she has not been able to draining any fluid from her catheter.  She continues to have mild shortness of breath, particularly with conversation or exertion.  She does utilize 2 L nasal cannula.  Thankfully, she denies any chest pain.  She does feel like her dry cough is slightly worse over the past 2 days.  She denies any fevers or chills.  She denies any chest pain with inspiration.  She continues to struggle with fatigue and weight loss, which is been ongoing issues for the patient.    SOCIAL HISTORY: She is  and lives with her . She works as a  for [employer]. She does not smoke or drink. She has no risk factors for HIV or drug history.     FAMILY HISTORY: Parents are both alive, father at 95 years and mother at 81 years. She has two sisters and one half-brother. Maternal grandmother had uterine cancer in 50s. No breast or ovarian cancer in the family.     I have reviewed the patient's medical history in detail and updated the computerized patient record.    Review of Systems   Constitutional: Positive for activity change, fatigue and unexpected weight change. Negative for fever.   HENT: Negative for mouth sores and nosebleeds.    Eyes: Negative for visual disturbance.   Respiratory: Positive for cough and shortness of breath. Negative for chest tightness.    Cardiovascular: Negative for chest pain, palpitations and leg swelling.   Gastrointestinal: Negative for constipation, diarrhea and nausea.   Genitourinary: Negative for difficulty urinating.   Musculoskeletal: Negative for arthralgias.   Skin: Negative for color change and rash.   Neurological: Negative for weakness and light-headedness.   Psychiatric/Behavioral: The patient is not nervous/anxious.    All other systems reviewed and are negative.       Current Outpatient Prescriptions on File Prior to Visit   Medication Sig Dispense Refill   • bisacodyl (DULCOLAX) 5 MG EC tablet Take 10 mg by mouth Daily As  "Needed for Constipation (PT STS TAKES 2-3 PPRN).     • fentaNYL (DURAGESIC) 25 MCG/HR patch Place 1 patch on the skin Every 72 (Seventy-Two) Hours. (Patient taking differently: Place 1 patch on the skin Every 72 (Seventy-Two) Hours. DUE TO BE CHANGED 06/03/2017) 10 patch 0   • Glycerin-Hypromellose- (ARTIFICIAL TEARS) 0.2-0.2-1 % solution ophthalmic solution Administer 1 drop to both eyes Every 1 (One) Hour As Needed for Dry Eyes. 1 bottle 2   • lactulose (CHRONULAC) 10 GM/15ML solution Take 30 mL by mouth Daily. (Patient taking differently: Take 20 g by mouth Daily As Needed.) 300 mL 1   • ondansetron (ZOFRAN) 8 MG tablet Take 1 tablet by mouth Every 8 (Eight) Hours As Needed for nausea or vomiting. 30 tablet 2   • predniSONE (DELTASONE) 10 MG tablet TAKE 1 TABLET BY MOUTH DAILY.  3   • prochlorperazine (COMPAZINE) 10 MG tablet Take 10 mg by mouth Every 6 (Six) Hours As Needed for Nausea or Vomiting.     • promethazine (PHENERGAN) 12.5 MG tablet Take 1 tablet by mouth Every 6 (Six) Hours As Needed for Nausea or Vomiting. 40 tablet 1   • Scopolamine (TRANSDERM-SCOP, 1.5 MG,) 1.5 MG/3DAYS patch Place 1 patch on the skin Every 72 (Seventy-Two) Hours. (Patient taking differently: Place 1 patch on the skin Every 72 (Seventy-Two) Hours. DUE TO BE CHANGED 06/03/2017) 24 patch 3   • sennosides-docusate sodium (SENOKOT-S) 8.6-50 MG tablet Take 2 tablets by mouth Daily. 60 tablet 5     Current Facility-Administered Medications on File Prior to Visit   Medication Dose Route Frequency Provider Last Rate Last Dose   • alteplase ((CATHFLO/ACTIVASE)) injection 2 mg  2 mg Intracatheter Once Yokasta N Head, APRN             ALLERGIES:    Allergies   Allergen Reactions   • Latex Rash       Objective      Vitals:    06/20/17 1220   BP: 100/64   Pulse: 114   Resp: 16   Temp: 98.3 °F (36.8 °C)   TempSrc: Oral   SpO2: 97%  Comment: 2L   Weight: 118 lb 9.6 oz (53.8 kg)   Height: 66.73\" (169.5 cm)   PainSc: 0-No pain     Current Status " 6/20/2017   ECOG score 1     Physical Exam    GENERAL:  Thin cachectic in no acute distress.  On 2 L O2  SKIN:  Warm, dry without rashes, purpura or petechiae.  EYES:  Pupils equal, round and reactive to light.  EOMs intact.  Conjunctivae normal.  EARS:  Hearing intact.  NOSE:  Septum midline.  No excoriations or nasal discharge.  MOUTH:  Tongue is well-papillated; no stomatitis or ulcers.  Lips normal.  THROAT:  Oropharynx without lesions or exudates.  NECK:  Supple with good range of motion; no thyromegaly or masses, no JVD.  LYMPHATICS:  No cervical, and 1x1 cm left supraclavicular node palpated, no axillary or inguinal adenopathy.  CHEST:  Lung primary intriguing entire left lung decreased breath sounds right base  CARDIAC:  Regular rate and rhythm without murmurs, rubs or gallops. Normal S1,S2.  ABDOMEN:  Soft, nontender with no hepatosplenomegaly or masses.  EXTREMITIES:  No clubbing, cyanosis or edema.  NEUROLOGICAL:  Cranial Nerves II-XII grossly intact.  No focal neurological deficits.  PSYCHIATRIC:  Normal affect and mood.          RECENT LABS:  Lab Results   Component Value Date    WBC 9.47 06/20/2017    HGB 11.4 (L) 06/20/2017    HCT 36.0 06/20/2017    MCV 90.2 06/20/2017     06/20/2017     IAssessment/Plan   1. Metastatic ER/MS negative HER-2 positive breast cancer to brain and liver and chest wall.  Progressed on Tykerb and Xeloda. Intolerant to Navelbine and Herceptin switched to Abraxane and Herceptin on 4/17/17. Next due to begin cycle 4 6/20/2017 after evaluation by Dr. Manley    2. Pleural effusions. Right thoracentesis 6/4/17; left pleurX drainage planned 3x/wk. Activase was instilled into PleurX catheter on 6/2/17. Unable to drain since discharge. Chest x-ray performed in the office today revealed resolution of right pleural effusion.  Left pleural effusion remains present.   I spoken with [facility] David at Dr. Arriaga office regarding the need for Activase. It is my understanding,  it has  been communicated with both Dr. Arriaga, and RAZ Porter the current situation, and results of the most recent chest x-ray performed in the office today.  The patient will present to their our office tomorrow for evaluation, and instillation of Activase into the Pleurx catheter.  Symptomatically, the patient currently remains stable.    3. Pericardial effusion. Evaluated by Dr. Perez while inpatient.  Prior to discharge, echocardiogram revealed improvement, the patient did not require pericardiocentesis.  She remains asymptomatic    4.  Pain secondary malignancy.  Well controlled with Duragesic 25 mcg patch every 72 hours.    5. Narcotic-induced constipation.  Currently controlled with Dulcolax and lactulose as needed.      Plan   1.  Abraxane Herceptin cycle #3 day 1 today   2.   Continue Duragesic patch 25µg every 3 days  3.  return in one week for day 8.  4.  Continue supportive care including bowel regimen, and nausea medication.  5.  Return in 3 weeks with CAT scans for review       Ted Manley MD  6/20/2017      CC:

## 2017-06-28 NOTE — TELEPHONE ENCOUNTER
Patient left voicemail requesting refill for fentanyl.  Script placed on Dr. Manley's desk, she will be available around noon for 2 patients today.  Will call when ready for p/u.

## 2017-06-29 NOTE — TELEPHONE ENCOUNTER
Pt. Needs to have a portable oxygen concentrator for her oxygen.  Inquiring how to go about getting that.  received her oxygen from [facility].  Informed pt. That usually with the initial order we check for that to be added.  Pt. advised to contact [facility]  To see what order they need and we will be glad to assist her with that.  Understanding noted.

## 2017-07-06 NOTE — TELEPHONE ENCOUNTER
----- Message from Sarah Leonard sent at 7/6/2017  2:56 PM EDT -----  Contact: [phone]  Patient called again today requesting activase, and also wanting to know if the CT chest had been reviewed     7/6/17 at 1509  I had Dr. Arriaga review patient's CT scan of chest completed yesterday.  Official radiologist interpretation is still pending.  Dr. Arriaga does not feel Activase into the PleurX catheter will help.  Feel her worsening shortness of breath is related to the lung mass.  Minimal fluid present and is not located where PleurX catheter is.  Only recommendations to offer would be to have ultrasound thoracentesis.  I notified patient of Dr. Arriaga's recommendations.  Currently awaiting official radiologist interpretation and patient is being followed per Dr. Manley.    Yokasta Luo, APRN

## 2017-07-11 NOTE — PROGRESS NOTES
Pt to infusion area for 1st time gemzar , has had herceptin previously  Education and teaching done for gemzar including indication, treatment parameters , and side effects  Written pampllet given to patient . Questions answered . Pt also discussed with Dr Manley today  Chemo permit signed.

## 2017-07-12 NOTE — PROGRESS NOTES
Subjective   REASONS FOR FOLLOWUP:   1. T3N2M1,  ER/NE negative, HER-2 positive breast cancer.   2. Biopsy-proven isolated   liver metastasis. Plan to do aggressive chemotherapy with TCH/Perjeta and reevaluate the liver after 3 cycles and continue 6 cycles of chemotherapy followed by Perjeta, Herceptin indefinitely as long as there is no recurrence.   3. Excellent response to 2 cycles of TCH/Perjeta.   4. Day 8 Herceptin missed cycle 2 because of anal fissure which needed to be operated on.   5. Near complete resolution with 4 cycles of TCH/Perjeta.   6. No further improvement, but no visible tumor in the chest and stable, barely detectable nodu le in the liver. Perjeta, Herceptin by themselves continued for now, with plans for surgery and radiation.   7. Negative PET scan after 6 cycles of TCH/Perjeta. Surgery on the left breast and axilla planned.   8. At the time of surgery, 12/31/2014, the patient h ad [id number] disease. The decision was made to do radiation to the axilla and supraclavicular areas and the liver lesion. Continue Perjeta and Herceptin indefinitely until there are signs of progression.   9. Skin lesion in left chest wall positive breast canc er. CT scans of chest, abdomen and pelvis showed no new disease on 02/10/2015. Radiation to the chest wall. Herceptin and Perjeta to continue until there is clear progression.   10. Stereotactic day radiation to solitary brain lesion completed. The patient remains on a Decadron taper.   11. Isolated brain met, irradiated on 07/10/2015 with focused radiation.   12. Progressive pulmonary metastasis and left supraclavicular nodes and brain met on 8/9/2016 switched to Kadcyla  13. Progressive disease in lymph nodes on Kadcyla switched to Tykerb and Xeloda in 1/17  14. Progressive disease noted on CT 3/21. Axillary lymph node biopsied, disease continues to be Her-2 positive.  15. Initiation of Abraxane/Herceptin 4/14/17.       History of Present Illness     The  patient is a pleasant  56 y.o.  with metastatic HER-2 positive breast cancer to brain and bones and chest comes in today after 3 cycles of Abraxane Herceptin for scan review.  She is feeling better in terms of shortness of breath because of pleural fluid has not reaccumulated.  Her pain is well controlled her appetite is fair.  She occasionally has some attacks when she coughs and is short of breath but it passes and she uses oxygen when necessary during these times.    her pain is well controlled with Duragesic 25 every 3 days with when necessary Percocet  Bowel movements are regular-she has no side effects from the chemotherapy at this point.     she is clinically much improved and her supraclavicular adenopathy has completely regressed with the treatment and therefore she is having at least a mixed response.  CAT scans show marked regression in the pleural and pericardial effusions and a persistent consolidation of almost the whole left lung which is stable but there is a new lesion at T11 and a new left adrenal nodule that was not seen in early June and his scalp metastasis has not responded so far to the Abraxane and Herceptin.    At this point we have decided to switch to Gemzar and Herceptin to see if her scalp metastases and the adrenal met and T11 metastases respond to this and hope that the Herceptin was helping with the effusions in the left supraclavicular adenopathy.  Unfortunately tumor markers have never been very helpful to follow her disease      PAST MEDICAL HISTORY: Negative except for anemia intermittently which responded to iron.   Active Ambulatory Problems     Diagnosis Date Noted   • Breast cancer metastasized to brain 02/08/2016   • Breast cancer metastasized to liver 02/08/2016   • Encounter for long-term (current) use of high-risk medication 08/09/2016   • Fitting and adjustment of vascular catheter 03/15/2017   • Intractable pain 04/26/2017   • Nausea and vomiting 04/26/2017   • Malignant  neoplasm metastatic to left lung 2017   • Leukocytopenia 2017   • Anemia associated with chemotherapy 2017   • Dyspnea, unspecified 2017   • Hyponatremia syndrome 2017   • Bilateral pleural effusion 2017   • Chemotherapy induced neutropenia 2017   • Malignant neoplasm of other specified sites of female breast 2017   • Cancer-related pain 2017   • Pleuritic chest pain 2017   • Pericardial effusion without cardiac tamponade 2017     Resolved Ambulatory Problems     Diagnosis Date Noted   • No Resolved Ambulatory Problems     Past Medical History:   Diagnosis Date   • Anemia    • Breast cancer    • Liver metastasis    • Metastasis to brain        PAST SURGICAL HISTORY: None.     OB-GYN HISTORY: Menarche at age 15, menopause 2012.  2, para 2. First childbirth was at age 31. She breast-fed her first child for 4 months, but not her second one. She has not been on hormone replacement.     HEMATOLOGIC/ONCOLOGIC HISTORY:   The patient is a 54-year-old  with Control4 who noted a mass in her right breast recently and immediately realized that it is probably a malignancy and went to see her family doctor who sent her for a mammogram and ultrasound. Mammogram and ultrasound confirmed an irregular mass in the 9 o'clock position of the left breast with an additional smaller area a mass in the subareolar tissue. Left breast ultrasound revealed 2.3 x 1.9 cm solid mass at the 9 o'clock position and a solid mass in the subareolar region, measuring 6 x 5 x 5, and also an enlarged lymph node measuring 2.3 x 2 in the left axilla. The patient underwent FNA of all three lesions and the pathology report showed the two lesions in the breast to be high grade invasive ductal car cinoma, grade 3, estrogen/progesterone receptors negative, HER-2 positivity was reported but I am not sure if both lesions were tested. Axillary lymph node cytology was positive for  malignant cells.    The patient was seen on 07/03/2014 with a normal bone sc an and echo which shows EF of 50% although it looked visually higher. CT scans of the chest, abdomen, and pelvis unfortunately showed the obvious breast mass in the left breast with enlarged lymph nodes in the axilla. In addition, there was a 6 mm pleural based nodule of the left lung base and a 1.5 cm low attenuation lesion in the hepatic segment, suspicious for metastatic disease. The decision was made to do a PET scan and biopsy of the liver lesion and tailor therapy according to the results. We discu ssed also that if she has an isolated metastasis in the liver, we would consider being aggressive and proceeding with treatment, as previously outlined, followed by a response of targeted therapy at the liver at the conclusion of treatment.    Liver biopsy s howed metastatic breast cancer and based on the fact this was an isolated metastasis we presented her at conference and opted to go for aggressive treatment with TCH/Perjeta, re-evaluate the liver lesion and if there is a complete response to continue wi th Arimidex, Perjeta and Herceptin as long as she is tolerating it well.    The patient had significant diarrhea toxicity with cycle 1. Neulasta added for cycle 2 with IV fluid support on day 5 and scans planned after cycle 2. Hemorrhoids being addressed by Dr. Molly Lopez.    The patient was seen on 08/21/2014 with scans that showed dramatic improvement in the primary breast tumor and the left axillary node and subpectoral nodes and the isolated liver metastasis, which is 50% smaller. Plan to continue for 6 do ses with followup scans and local therapy to the liver and breast if tolerated.    The patient was seen on 10/01/2014 with CAT scans that showed almost complete resolution of her single liver metastasis and is now 4 mm in size. The breast mass was not commen lauren on, suggesting that this is also essentially unremarkable and no  significant axillary adenopathy appreciated. The decision was made to finish 6 cycles of TCH/Perjeta and have surgery with left mastectomy and dissection followed by focus radiation to t he liver lesion and chest with Herceptin and Perjeta continued in a treatment setting.    Patient seen through triage in the infusion center on 11/06/2014 with reports of fever. Blood cultures obtained, urinalysis obtained and chest x-ray revealed negative results. She was started on Augmentin 875 mg twice daily for 7 days.   Patient seen on 11/13/2014 after 6 cycles of TCH/Perjeta. There is no tumor visible on the chest wall, breasts, axillae or supraclavicular areas. There is a 6 mm lung nodule, which is unc hanged and stable from pretreatment and very likely benign. The liver lesion is just barely perceptible and unchanged from the last scan. Decision was made to continue with Perjeta and Herceptin by themselves and evaluate for surgery and radiation therea fter. Possible directive therapy to the liver if there has been a complete pathological response in the chest and axilla.    Patient seen on 01/20/2015 with pathology report from her surgery showing just residual ductal carcinoma in situ with no invasive felix or left in the breast but 2 of 3 sentinel nodes positive for metastatic disease 2 mm deposits including the node with the clip in it. Decision was made to radiate the chest wall and liver lesion and continue Perjeta/Herceptin.    The patient was seen on 0 2/10/2015 with CT scans of chest, abdomen and pelvis which showed no evidence of progressive disease. There is a stable 6 mm lung nodule that is unchanged and a stable site of metastasis in the liver which is unchanged and no new evidence of metastatic d isease. Biopsy of the chest wall lesion showed breast cancer with HER-2 positivity. The decision was made, because there is no other progression outside the local area, to continue with radiation to the chest wall,  including axillary, subpectoral, supracl a vicular nodes and the skin lesion, and continue Perjeta and Herceptin until there is clinical progression outside the chest wall. We do realize at this point that there is rapid progression in the chest wall and it is very unlikely she is going to be cure d from her disease.    The patient was seen on 05/05/2015. Echocardiogram was stable. PET scan shows consolidation of the left upper lobe at the site of her radiation, which consists of radiation pneumonitis. There is a very low level activity in the chest wall, probably corresponding to her skin metastasis in the radiated field and the liver metastasis has resolved. The decision was made to continue with Herceptin/Perjeta for the time being with repeat scans in 4 months.    The patient had an MRI dated 06/18/ 2015 which showed an isolated 1 cm metastasis in the left inferior temporal gyrus. Focused radiation was given with good results. Patient seen on 07/28/2015 clinically doing well. She had a viral GI infection 2 days prior which has resolved with some re sultant mild leukopenia and thrombocytopenia. Echo showed ejection fraction of 52% but it was done at another facility and we will have it reviewed and continue with treatment for now, with scans planned in 3 weeks.   The patient was seen on 08/18/2015 wit h CT scans of chest, abdomen, and pelvis that show essentially stable disease with no new metastasis and some small nodules in the chest which are unchanged and no liver metastasis. Perjeta and Herceptin continue with plans for bone scan and MRI of the b rain to follow up on a brain in 3 weeks and mammogram and ultrasound ordered for some minimal swelling of the breast with no obvious masses.    Patient seen on 09/08/2015 with negative bone scan and MRI showing further resolution of her isolated brain met. Th e decision was made to continue on current treatment for the time being and follow up echoes every 4 months.  Borderline iron depletion with good improvement in symptoms with Feraheme.    Patient seen on 12/22/2015 with a brain MRI that shows 2 stable resid ual small lesions, post radiation in her brain. CT abdomen and pelvis and chest CT are negative for metastatic disease or obvious bony lesions. Perjeta/Herceptin continued with plans for echocardiograms every 4 months.     Patient seen on 02/02/2016, clinically stable. Echocardiogram dated 01/07/2016 shows an EF of 62%. Patient is having slow recovery from upper respiratory infection and we have given her a steroid pack and continued treatment, as she had scans just a month ago that were very good    Progressive pulmonary metastasis and left supraclavicular nodes and brain met on 8/9/2016 switched to Kadcyla   Unfortunately as we expected her CAT scans show progressive disease in the left supraclavicular area and mediastinum as well as some small pulmonary nodules and no disease in the abdomen.  Her brain MRI also shows increase in size of the solitary lesion in the left temporal occipital junction since October with some surrounding edema which is very asymptomatic      At this point clearly we need a change in systemic treatment and some local treatment for her solitary brain met.  Refer to radiation therapy for focused radiation and will switch to Tykerb and Xeloda.  Because of her borderline blood counts I have started was 5 pills of Xeloda daily 2 weeks on and 2 weeks off plus the Tykerb and she will have chemotherapy education for this next couple of days.  She will start the Tykerb next week and hold off on the Xeloda until her counts are better and the radiation is completed in 2 weeks   Progressive disease in lymph nodes and one new brain metastases on Kadcyla switched to Tykerb and Xeloda in 1/17 plus focused radiation to the brain  patient is a 54 yo female with metastatic HER-2 positive breast cancer ER/OH negative with progression in the brain and nodes on  Abeltara.  She is receiving focused radiation to her brain metastases with concurrent Tykerb and is just finished one two-week cycle of Xeloda 5 pills a day which she tolerated well.  She comes in today obviously concerned because her left supraclavicular nodes are increasing significantly in size despite being on Xeloda and Tykerb.  In addition she has a acneiform eruption on her face which is very  bothersome from the Tykerb.  It is clear that she is not responding to these medications and she is finally agreeable to a clinical trial.  I called Dr. Betty Rivera at [facility] and they currently do not have a trial that she is eligible for.  We do have the match trial here in our institution and we have tentatively given her the consent form for this.  I've elected to rebiopsy her supra-clavicular node that this point to see if they are still HER-2 positive regardless of the study but if she agrees to the study we could use this tissue to do the testing for targeted option on the match trial.   Supraclavicular nodes were rebiopsied and showed persistent HER-2 positivity and restarted onNAvelbine/Herceptin but after 2 doses she had severe pain with each infusion and had clinical evidence of progressive disease she was hospitalized and had a Pleurx catheter placed on the left.  She was switched to Abraxane and Herceptin and is tolerating this well     The patient was most recently admitted 6/2/2017 through 6/8/2017.  She was initially admitted for shortness of breath and chest pain.  She does have a known left malignant pleural effusion with a Pleurx catheter in place.  Additionally, she was found to have a pericardial effusion, and right pleural effusion.  She did undergo a right ultrasound-guided thoracentesis with 850 ml of bloody fluid removed.  Additionally, RAZ Porter placed Activase in the left Pleurx catheter.  She was unable to remove 350 ml from the left.  In regards to her chest pain, this did resolve.   Initial plans to undergo pericardiocentesis were canceled, as the pleural effusion was noted to be quite small on echocardiogram, the patient was asymptomatic.  She did receive cycle 3 day 8 of Abraxane while inpatient 6/6/2017. She was discharged 6/8/2017.   Returning today, 6/15/2017, the patient has continued to struggle with drainage from her left Pleurx catheter.  Since discharge, she has not been able to draining any fluid from her catheter.  She continues to have mild shortness of breath, particularly with conversation or exertion.  She does utilize 2 L nasal cannula.  Thankfully, she denies any chest pain.  She does feel like her dry cough is slightly worse over the past 2 days.  She denies any fevers or chills.  She denies any chest pain with inspiration.  She continues to struggle with fatigue and weight loss, which is been ongoing issues for the patient.    SOCIAL HISTORY: She is  and lives with her . She works as a  for [employer]. She does not smoke or drink. She has no risk factors for HIV or drug history.     FAMILY HISTORY: Parents are both alive, father at 95 years and mother at 81 years. She has two sisters and one half-brother. Maternal grandmother had uterine cancer in 50s. No breast or ovarian cancer in the family.     I have reviewed the patient's medical history in detail and updated the computerized patient record.    Review of Systems   Constitutional: Positive for activity change, fatigue and unexpected weight change. Negative for fever.   HENT: Negative for mouth sores and nosebleeds.    Eyes: Negative for visual disturbance.   Respiratory: Positive for cough and shortness of breath. Negative for chest tightness.    Cardiovascular: Negative for chest pain, palpitations and leg swelling.   Gastrointestinal: Negative for constipation, diarrhea and nausea.   Genitourinary: Negative for difficulty urinating.   Musculoskeletal: Negative for arthralgias.   Skin: Negative  for color change and rash.   Neurological: Negative for weakness and light-headedness.   Psychiatric/Behavioral: The patient is not nervous/anxious.    All other systems reviewed and are negative.       Current Outpatient Prescriptions on File Prior to Visit   Medication Sig Dispense Refill   • bisacodyl (DULCOLAX) 5 MG EC tablet Take 10 mg by mouth Daily As Needed for Constipation (PT STS TAKES 2-3 PPRN).     • fentaNYL (DURAGESIC) 25 MCG/HR patch Place 1 patch on the skin Every 72 (Seventy-Two) Hours. 10 patch 0   • Glycerin-Hypromellose- (ARTIFICIAL TEARS) 0.2-0.2-1 % solution ophthalmic solution Administer 1 drop to both eyes Every 1 (One) Hour As Needed for Dry Eyes. 1 bottle 2   • lactulose (CHRONULAC) 10 GM/15ML solution Take 30 mL by mouth Daily. (Patient taking differently: Take 20 g by mouth Daily As Needed.) 300 mL 1   • ondansetron (ZOFRAN) 8 MG tablet Take 1 tablet by mouth Every 8 (Eight) Hours As Needed for nausea or vomiting. 30 tablet 2   • predniSONE (DELTASONE) 10 MG tablet TAKE 1 TABLET BY MOUTH DAILY.  3   • Scopolamine (TRANSDERM-SCOP, 1.5 MG,) 1.5 MG/3DAYS patch Place 1 patch on the skin Every 72 (Seventy-Two) Hours. (Patient taking differently: Place 1 patch on the skin Every 72 (Seventy-Two) Hours. DUE TO BE CHANGED 06/03/2017) 24 patch 3   • sennosides-docusate sodium (SENOKOT-S) 8.6-50 MG tablet Take 2 tablets by mouth Daily. 60 tablet 5   • [DISCONTINUED] prochlorperazine (COMPAZINE) 10 MG tablet Take 10 mg by mouth Every 6 (Six) Hours As Needed for Nausea or Vomiting.     • [DISCONTINUED] promethazine (PHENERGAN) 12.5 MG tablet Take 1 tablet by mouth Every 6 (Six) Hours As Needed for Nausea or Vomiting. 40 tablet 1     Current Facility-Administered Medications on File Prior to Visit   Medication Dose Route Frequency Provider Last Rate Last Dose   • alteplase ((CATHFLO/ACTIVASE)) injection 2 mg  2 mg Intracatheter Once Yokasta N Head, APRN       • [COMPLETED] dexamethasone (DECADRON)  "12 mg in sodium chloride 0.9 % IVPB  12 mg Intravenous Once Ted Manley MD   Stopped at 07/11/17 1121   • [COMPLETED] gemcitabine (GEMZAR) 1,700 mg in sodium chloride 0.9 % 294.71 mL chemo IVPB  1,000 mg/m2 (Treatment Plan Recorded) Intravenous Once Ted Manley MD   Stopped at 07/11/17 1156   • [COMPLETED] sodium chloride 0.9 % infusion 250 mL  250 mL Intravenous Once Ted Manley MD   Stopped at 07/11/17 1230   • [COMPLETED] Trastuzumab (HERCEPTIN) 370 mg in sodium chloride 0.9 % 267.6367 mL chemo IVPB  6 mg/kg (Treatment Plan Recorded) Intravenous Once Ted Manley MD   Stopped at 07/11/17 1230         ALLERGIES:    Allergies   Allergen Reactions   • Latex Rash       Objective      Vitals:    07/11/17 0906   BP: 94/58   Pulse: 116   Resp: 18   Temp: 97.5 °F (36.4 °C)   TempSrc: Oral   SpO2: 100%  Comment: 2L O2 continuous.   Weight: 121 lb (54.9 kg)   Height: 66.73\" (169.5 cm)   PainSc: 7  Comment: drain site     Current Status 7/11/2017   ECOG score 1     Physical Exam    GENERAL:  Thin cachectic in no acute distress.  On 2 L O2  SKIN:  Warm, dry without rashes, purpura or petechiae.  1.5 x 1.5 cm left scalp metastasis  EYES:  Pupils equal, round and reactive to light.  EOMs intact.  Conjunctivae normal.  EARS:  Hearing intact.  NOSE:  Septum midline.  No excoriations or nasal discharge.  MOUTH:  Tongue is well-papillated; no stomatitis or ulcers.  Lips normal.  THROAT:  Oropharynx without lesions or exudates.  NECK:  Supple with good range of motion; no thyromegaly or masses, no JVD.  LYMPHATICS:  No cervical, no left supraclavicular node palpated, no axillary or inguinal adenopathy.  CHEST:  Lung entire left lung decreased breath -right lung normal   CARDIAC:  Regular rate and rhythm without murmurs, rubs or gallops. Normal S1,S2.  ABDOMEN:  Soft, nontender with no hepatosplenomegaly or masses.  EXTREMITIES:  No clubbing, cyanosis or edema.  NEUROLOGICAL:  Cranial Nerves II-XII grossly intact. "  No focal neurological deficits.  PSYCHIATRIC:  Normal affect and mood.          RECENT LABS:  Lab Results   Component Value Date    WBC 5.43 07/11/2017    HGB 10.2 (L) 07/11/2017    HCT 32.6 (L) 07/11/2017    MCV 88.1 07/11/2017     07/11/2017     IAssessment/Plan   1. Metastatic ER/SD negative HER-2 positive breast cancer to brain and liver and chest wall.  Progressed on Tykerb and Xeloda. Intolerant to Navelbine and Herceptin switched to Abraxane and Herceptin on 4/17/17. Mixed response with new disease at T11 and left adrenal and no response in the skull metastasis  2. Pleural effusions resolved    3. Pericardial effusion.  Resolved     4.  Pain secondary malignancy.  Well controlled with Duragesic 25 mcg patch every 72 hours.    5. Narcotic-induced constipation.  Currently controlled with Dulcolax and lactulose as needed.      Plan   1.  Gemzar  Herceptin cycle 1 today   2.   Continue Duragesic patch 25µg every 3 days  3.  return in one week for day 8.  4.  Continue supportive care including bowel regimen, and nausea medication.  5.  Return in 3 weeks for NP visit with next cycle of gem Herceptin and see me in 6 weeks for her third cycle   Obviously dose reduction will be made  depending on her tolerance   of this regimen and we will watch her scalp metastasis for response -when I see her in 6 weeks we will add Zometa for her bone metastasis       Ted Manley MD  7/11/2017      CC:

## 2017-07-18 NOTE — PROGRESS NOTES
Pt here for day 8 gemzar. States the cough she has had for the past few months has become increasingly worse over past 2 days. It is non productive, but much more frequent and causing her to feel exhausted and to have a sore throat. Denies any fevers or night sweats.O2 requirement remains at 2L per NC and she is not requiring any increase in O2 at home. Pulse ox 9%.  WBC is WNL, however neutrophil % is slightly increased. Discussed with Dr. Manley. Cipro 500mg BID x14 days e-scribed and pt given script for Tussinex BID. Pt tolerated chemo today without any issues.

## 2017-07-21 NOTE — TELEPHONE ENCOUNTER
Pt is calling bc her  insisted that she call today bc she is not due to be seen until aug 1st and she is miserable. She says she can not pin point what is making her miserable. She is coughing all the time. Her whole body is sore, pretty her upper body from coughing all the time. The cough syrup that was prescribed did not help. She states she does not know if she has had any fever bc she has not checked her temp. She started taking Cipro in 7/18.     She is only drinking approx 2 cups of water a day. Yesterday she only ate half a smoothie and 1/3 of her dinner. Voiding ok. No issues with BM.      Spoke with Dr. Manley. Dr. Manley recommends pt taking codeine tablets instead of the cough syrup. Codeine 60mg 1 tab every 6 hrs prn #120. Informed pt. Pt v/u. Pt's  will come  script. Informed pt to also take zofran every 8 hours on a schedule so that the nausea does not get out of hand. Pt v/u    Pt called back stating that her pharmacy could not get codeine tablets in stock until next Thursday. I called [facility] and [facility], none of these or the surrounding pharmacies within a 2 hours radius. I called [facility]'s pharmacy and they can get the medication on Monday.     Spoke with Dr. Manley. She wants pt to take Hycodan until Monday when we can get codeine from [facility] pharmacy. New script for hycodan printed and signed. Called [facility] pharmacy to inform them that they need to order codeine. Spoke with pharm tech. She v/u

## 2017-07-21 NOTE — TELEPHONE ENCOUNTER
"Called pt re: Hycodan.  Per [facility], this med is not a covered med and a formulary exception would be 24-72 hours.  Called pharmacy and the Hycodan would be $39 out of pocket.  Spoke to Dr. Manley and she stated that pt could try Robitussin Cough OTC until the codeine script could get filled.      Explained this to pt.  She states that Robitussin had helped her in the past and she \"forgot all about it\".  She states that she has Robitussin at home and will try it.  I advised her to call if this does not help or cough worsens.  Pt v/u  "

## 2017-08-01 NOTE — PROGRESS NOTES
This patient was admitted from the office today.  A stat CT was obtained and I was asked to review the results and discussed with the patient.  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  Significant increase in right pleural effusion, now large.    I've ordered a right-sided therapeutic Thoracentesis  I have ordered a thoracic surgery consultation for assistance with 2 issues:  #1.  Which she benefit from a Pleurx catheter on the right side?  #2.  With a stent or other intervention help for the left mainstem bronchus occlusion?      I discussed CODE STATUS with patient and .  For now, she wants a full code.  However, she states that she is intubated she wants to remain on the ventilator until her son arrives at which point she wants her family to turn the ventilator off.  She states she wants her son to see her before she passes away.  However, she has not asked her son if he wants to see her on the ventilator before she passes away.  She will discuss this with him by phone tonight and understands she can change her CODE STATUS.

## 2017-08-01 NOTE — PLAN OF CARE
Problem: Pressure Ulcer Risk (Josse Scale) (Adult,Obstetrics,Pediatric)  Goal: Identify Related Risk Factors and Signs and Symptoms  Outcome: Outcome(s) achieved Date Met:  08/01/17

## 2017-08-01 NOTE — PLAN OF CARE
Problem: Oncology Care (Adult)  Goal: Signs and Symptoms of Listed Potential Problems Will be Absent or Manageable (Oncology Care)  Outcome: Outcome(s) achieved Date Met:  08/01/17

## 2017-08-01 NOTE — PLAN OF CARE
Problem: Fall Risk (Adult)  Goal: Identify Related Risk Factors and Signs and Symptoms  Outcome: Outcome(s) achieved Date Met:  08/01/17

## 2017-08-01 NOTE — PROGRESS NOTES
Subjective   REASONS FOR FOLLOWUP:   1. T3N2M1,  ER/ID negative, HER-2 positive breast cancer.   2. Biopsy-proven isolated   liver metastasis. Plan to do aggressive chemotherapy with TCH/Perjeta and reevaluate the liver after 3 cycles and continue 6 cycles of chemotherapy followed by Perjeta, Herceptin indefinitely as long as there is no recurrence.   3. Excellent response to 2 cycles of TCH/Perjeta.   4. Day 8 Herceptin missed cycle 2 because of anal fissure which needed to be operated on.   5. Near complete resolution with 4 cycles of TCH/Perjeta.   6. No further improvement, but no visible tumor in the chest and stable, barely detectable nodu le in the liver. Perjeta, Herceptin by themselves continued for now, with plans for surgery and radiation.   7. Negative PET scan after 6 cycles of TCH/Perjeta. Surgery on the left breast and axilla planned.   8. At the time of surgery, 12/31/2014, the patient h ad [id number] disease. The decision was made to do radiation to the axilla and supraclavicular areas and the liver lesion. Continue Perjeta and Herceptin indefinitely until there are signs of progression.   9. Skin lesion in left chest wall positive breast canc er. CT scans of chest, abdomen and pelvis showed no new disease on 02/10/2015. Radiation to the chest wall. Herceptin and Perjeta to continue until there is clear progression.   10. Stereotactic day radiation to solitary brain lesion completed. The patient remains on a Decadron taper.   11. Isolated brain met, irradiated on 07/10/2015 with focused radiation.   12. Progressive pulmonary metastasis and left supraclavicular nodes and brain met on 8/9/2016 switched to Kadcyla  13. Progressive disease in lymph nodes on Kadcyla switched to Tykerb and Xeloda in 1/17  14. Progressive disease noted on CT 3/21. Axillary lymph node biopsied, disease continues to be Her-2 positive.  15. Initiation of Abraxane/Herceptin 4/14/17.  16. Mixed response to Abraxane/Herceptin  provement in supraclavicular adenopathy and progression of pleural and pericardial effusions.  However new lesions seen at T11 and no response in scalp metastasis.  Therapy switched to Gemzar/Herceptin beginning 7/11/17.       History of Present Illness     The patient is a pleasant  56 y.o.  with metastatic HER-2 positive breast cancer to brain and bones and chest who returns today for cycle 2 of Gemzar/Herceptin.  The patient is gasping for air during our visit though she has 97% on 3 L of oxygen.  She states she has been getting progressively worse in this regard for the last 2 weeks, especially bad in the last 2 days, feeling like she cannot breathe.    The patient was quickly examined and noted to not be moving air in her bases which prompted a chest x-ray ordered.  X-ray showing recurrent moderate right pleural effusion and concern for large left pleural effusion and/or progressive disease in the left lung.    Patient also reporting pain all over despite fentanyl 25 µg patch and Dilaudid 1 mg every 2 hours.  Due to her distress, decision made to admit the patient for medical management.    PAST MEDICAL HISTORY: Negative except for anemia intermittently which responded to iron.   Active Ambulatory Problems     Diagnosis Date Noted   • Breast cancer metastasized to brain 02/08/2016   • Breast cancer metastasized to liver 02/08/2016   • Encounter for long-term (current) use of high-risk medication 08/09/2016   • Fitting and adjustment of vascular catheter 03/15/2017   • Intractable pain 04/26/2017   • Nausea and vomiting 04/26/2017   • Malignant neoplasm metastatic to left lung 04/27/2017   • Leukocytopenia 04/27/2017   • Anemia associated with chemotherapy 04/27/2017   • Dyspnea, unspecified 04/27/2017   • Hyponatremia syndrome 04/27/2017   • Bilateral pleural effusion 04/27/2017   • Chemotherapy induced neutropenia 04/30/2017   • Malignant neoplasm of other specified sites of female breast 05/09/2017   •  Cancer-related pain 2017   • Pleuritic chest pain 2017   • Pericardial effusion without cardiac tamponade 2017   • Malignant neoplasm of left female breast, unspecified site of breast    • Malignant neoplasm of right female breast, unspecified site of breast      Resolved Ambulatory Problems     Diagnosis Date Noted   • No Resolved Ambulatory Problems     Past Medical History:   Diagnosis Date   • Anemia    • Breast cancer    • Liver metastasis    • Metastasis to brain        PAST SURGICAL HISTORY: None.     OB-GYN HISTORY: Menarche at age 15, menopause .  2, para 2. First childbirth was at age 31. She breast-fed her first child for 4 months, but not her second one. She has not been on hormone replacement.     HEMATOLOGIC/ONCOLOGIC HISTORY:   The patient is a 54-year-old  with [employer] who noted a mass in her right breast recently and immediately realized that it is probably a malignancy and went to see her family doctor who sent her for a mammogram and ultrasound. Mammogram and ultrasound confirmed an irregular mass in the 9 o'clock position of the left breast with an additional smaller area a mass in the subareolar tissue. Left breast ultrasound revealed 2.3 x 1.9 cm solid mass at the 9 o'clock position and a solid mass in the subareolar region, measuring 6 x 5 x 5, and also an enlarged lymph node measuring 2.3 x 2 in the left axilla. The patient underwent FNA of all three lesions and the pathology report showed the two lesions in the breast to be high grade invasive ductal car cinoma, grade 3, estrogen/progesterone receptors negative, HER-2 positivity was reported but I am not sure if both lesions were tested. Axillary lymph node cytology was positive for malignant cells.    The patient was seen on 2014 with a normal bone sc an and echo which shows EF of 50% although it looked visually higher. CT scans of the chest, abdomen, and pelvis unfortunately showed the  obvious breast mass in the left breast with enlarged lymph nodes in the axilla. In addition, there was a 6 mm pleural based nodule of the left lung base and a 1.5 cm low attenuation lesion in the hepatic segment, suspicious for metastatic disease. The decision was made to do a PET scan and biopsy of the liver lesion and tailor therapy according to the results. We discu ssed also that if she has an isolated metastasis in the liver, we would consider being aggressive and proceeding with treatment, as previously outlined, followed by a response of targeted therapy at the liver at the conclusion of treatment.    Liver biopsy s howed metastatic breast cancer and based on the fact this was an isolated metastasis we presented her at conference and opted to go for aggressive treatment with TCH/Perjeta, re-evaluate the liver lesion and if there is a complete response to continue wi th Arimidex, Perjeta and Herceptin as long as she is tolerating it well.    The patient had significant diarrhea toxicity with cycle 1. Neulasta added for cycle 2 with IV fluid support on day 5 and scans planned after cycle 2. Hemorrhoids being addressed by Dr. Molly Lopez.    The patient was seen on 08/21/2014 with scans that showed dramatic improvement in the primary breast tumor and the left axillary node and subpectoral nodes and the isolated liver metastasis, which is 50% smaller. Plan to continue for 6 do ses with followup scans and local therapy to the liver and breast if tolerated.    The patient was seen on 10/01/2014 with CAT scans that showed almost complete resolution of her single liver metastasis and is now 4 mm in size. The breast mass was not commen lauren on, suggesting that this is also essentially unremarkable and no significant axillary adenopathy appreciated. The decision was made to finish 6 cycles of TCH/Perjeta and have surgery with left mastectomy and dissection followed by focus radiation to t he liver lesion and chest with  Herceptin and Perjeta continued in a treatment setting.    Patient seen through triage in the infusion center on 11/06/2014 with reports of fever. Blood cultures obtained, urinalysis obtained and chest x-ray revealed negative results. She was started on Augmentin 875 mg twice daily for 7 days.   Patient seen on 11/13/2014 after 6 cycles of TCH/Perjeta. There is no tumor visible on the chest wall, breasts, axillae or supraclavicular areas. There is a 6 mm lung nodule, which is unc hanged and stable from pretreatment and very likely benign. The liver lesion is just barely perceptible and unchanged from the last scan. Decision was made to continue with Perjeta and Herceptin by themselves and evaluate for surgery and radiation therea fter. Possible directive therapy to the liver if there has been a complete pathological response in the chest and axilla.    Patient seen on 01/20/2015 with pathology report from her surgery showing just residual ductal carcinoma in situ with no invasive felix or left in the breast but 2 of 3 sentinel nodes positive for metastatic disease 2 mm deposits including the node with the clip in it. Decision was made to radiate the chest wall and liver lesion and continue Perjeta/Herceptin.    The patient was seen on 0 2/10/2015 with CT scans of chest, abdomen and pelvis which showed no evidence of progressive disease. There is a stable 6 mm lung nodule that is unchanged and a stable site of metastasis in the liver which is unchanged and no new evidence of metastatic d isease. Biopsy of the chest wall lesion showed breast cancer with HER-2 positivity. The decision was made, because there is no other progression outside the local area, to continue with radiation to the chest wall, including axillary, subpectoral, supracl a vicular nodes and the skin lesion, and continue Perjeta and Herceptin until there is clinical progression outside the chest wall. We do realize at this point that there is rapid  progression in the chest wall and it is very unlikely she is going to be cure d from her disease.    The patient was seen on 05/05/2015. Echocardiogram was stable. PET scan shows consolidation of the left upper lobe at the site of her radiation, which consists of radiation pneumonitis. There is a very low level activity in the chest wall, probably corresponding to her skin metastasis in the radiated field and the liver metastasis has resolved. The decision was made to continue with Herceptin/Perjeta for the time being with repeat scans in 4 months.    The patient had an MRI dated 06/18/ 2015 which showed an isolated 1 cm metastasis in the left inferior temporal gyrus. Focused radiation was given with good results. Patient seen on 07/28/2015 clinically doing well. She had a viral GI infection 2 days prior which has resolved with some re sultant mild leukopenia and thrombocytopenia. Echo showed ejection fraction of 52% but it was done at another facility and we will have it reviewed and continue with treatment for now, with scans planned in 3 weeks.   The patient was seen on 08/18/2015 wit h CT scans of chest, abdomen, and pelvis that show essentially stable disease with no new metastasis and some small nodules in the chest which are unchanged and no liver metastasis. Perjeta and Herceptin continue with plans for bone scan and MRI of the b rain to follow up on a brain in 3 weeks and mammogram and ultrasound ordered for some minimal swelling of the breast with no obvious masses.    Patient seen on 09/08/2015 with negative bone scan and MRI showing further resolution of her isolated brain met. Th e decision was made to continue on current treatment for the time being and follow up echoes every 4 months. Borderline iron depletion with good improvement in symptoms with Feraheme.    Patient seen on 12/22/2015 with a brain MRI that shows 2 stable resid ual small lesions, post radiation in her brain. CT abdomen and pelvis  and chest CT are negative for metastatic disease or obvious bony lesions. Perjeta/Herceptin continued with plans for echocardiograms every 4 months.     Patient seen on 02/02/2016, clinically stable. Echocardiogram dated 01/07/2016 shows an EF of 62%. Patient is having slow recovery from upper respiratory infection and we have given her a steroid pack and continued treatment, as she had scans just a month ago that were very good    Progressive pulmonary metastasis and left supraclavicular nodes and brain met on 8/9/2016 switched to Kadcyla   Unfortunately as we expected her CAT scans show progressive disease in the left supraclavicular area and mediastinum as well as some small pulmonary nodules and no disease in the abdomen.  Her brain MRI also shows increase in size of the solitary lesion in the left temporal occipital junction since October with some surrounding edema which is very asymptomatic      At this point clearly we need a change in systemic treatment and some local treatment for her solitary brain met.  Refer to radiation therapy for focused radiation and will switch to Tykerb and Xeloda.  Because of her borderline blood counts I have started was 5 pills of Xeloda daily 2 weeks on and 2 weeks off plus the Tykerb and she will have chemotherapy education for this next couple of days.  She will start the Tykerb next week and hold off on the Xeloda until her counts are better and the radiation is completed in 2 weeks   Progressive disease in lymph nodes and one new brain metastases on Kadcyla switched to Tykerb and Xeloda in 1/17 plus focused radiation to the brain  patient is a 54 yo female with metastatic HER-2 positive breast cancer ER/PA negative with progression in the brain and nodes on Kadcyla.  She is receiving focused radiation to her brain metastases with concurrent Tykerb and is just finished one two-week cycle of Xeloda 5 pills a day which she tolerated well.  She comes in today obviously  concerned because her left supraclavicular nodes are increasing significantly in size despite being on Xeloda and Tykerb.  In addition she has a acneiform eruption on her face which is very  bothersome from the Tykerb.  It is clear that she is not responding to these medications and she is finally agreeable to a clinical trial.  I called Dr. Betty Rivera at [place] and they currently do not have a trial that she is eligible for.  We do have the match trial here in our institution and we have tentatively given her the consent form for this.  I've elected to rebiopsy her supra-clavicular node that this point to see if they are still HER-2 positive regardless of the study but if she agrees to the study we could use this tissue to do the testing for targeted option on the match trial.   Supraclavicular nodes were rebiopsied and showed persistent HER-2 positivity and restarted onNAvelbine/Herceptin but after 2 doses she had severe pain with each infusion and had clinical evidence of progressive disease she was hospitalized and had a Pleurx catheter placed on the left.  She was switched to Abraxane and Herceptin and is tolerating this well     The patient was most recently admitted 6/2/2017 through 6/8/2017.  She was initially admitted for shortness of breath and chest pain.  She does have a known left malignant pleural effusion with a Pleurx catheter in place.  Additionally, she was found to have a pericardial effusion, and right pleural effusion.  She did undergo a right ultrasound-guided thoracentesis with 850 ml of bloody fluid removed.  Additionally, RAZ Porter placed Activase in the left Pleurx catheter.  She was unable to remove 350 ml from the left.  In regards to her chest pain, this did resolve.  Initial plans to undergo pericardiocentesis were canceled, as the pleural effusion was noted to be quite small on echocardiogram, the patient was asymptomatic.  She did receive cycle 3 day 8 of Abraxane while  inpatient 6/6/2017. She was discharged 6/8/2017.    Returning today, 6/15/2017, the patient has continued to struggle with drainage from her left Pleurx catheter.  Since discharge, she has not been able to draining any fluid from her catheter.  She continues to have mild shortness of breath, particularly with conversation or exertion.  She does utilize 2 L nasal cannula.  Thankfully, she denies any chest pain.  She does feel like her dry cough is slightly worse over the past 2 days.  She denies any fevers or chills.  She denies any chest pain with inspiration.  She continues to struggle with fatigue and weight loss, which is been ongoing issues for the patient.    Mixed response to Abraxane/Herceptin provement in supraclavicular adenopathy and progression of pleural and pericardial effusions.  However new lesions seen at T11 and no response in scalp metastasis noted.  Therapy switched to Gemzar/Herceptin beginning 7/11/17.    Patient seen 8/1/17, and acute distress with trouble breathing and chest x-ray showing recurrent bilateral pleural effusions and suspected progression of disease in the left lung.  Patient minute the hospital for medical management.    SOCIAL HISTORY: She is  and lives with her . She works as a  for [employer]. She does not smoke or drink. She has no risk factors for HIV or drug history.     FAMILY HISTORY: Parents are both alive, father at 95 years and mother at 81 years. She has two sisters and one half-brother. Maternal grandmother had uterine cancer in 50s. No breast or ovarian cancer in the family.     I have reviewed the patient's medical history in detail and updated the computerized patient record.    Review of Systems   Constitutional: Positive for activity change, fatigue and unexpected weight change. Negative for fever.   HENT: Negative for mouth sores and nosebleeds.    Eyes: Negative for visual disturbance.   Respiratory: Positive for cough and shortness of  breath. Negative for chest tightness.         See HPI   Cardiovascular: Negative for chest pain, palpitations and leg swelling.   Gastrointestinal: Negative for constipation, diarrhea and nausea.   Genitourinary: Negative.  Negative for difficulty urinating.   Musculoskeletal: Negative for arthralgias.        See HPI - generalized pain   Skin: Negative for color change and rash.   Neurological: Negative for weakness and light-headedness.   Psychiatric/Behavioral: The patient is not nervous/anxious.    All other systems reviewed and are negative.       Current Outpatient Prescriptions on File Prior to Visit   Medication Sig Dispense Refill   • bisacodyl (DULCOLAX) 5 MG EC tablet Take 10 mg by mouth Daily As Needed for Constipation (PT STS TAKES 2-3 PPRN).     • ciprofloxacin (CIPRO) 500 MG tablet Take 1 tablet by mouth 2 (Two) Times a Day for 14 days. 28 tablet 0   • codeine 60 MG tablet Take 1 tablet by mouth Every 6 (Six) Hours As Needed (as needed for cough). 120 tablet 0   • fentaNYL (DURAGESIC) 25 MCG/HR patch Place 1 patch on the skin Every 72 (Seventy-Two) Hours. 10 patch 0   • Glycerin-Hypromellose- (ARTIFICIAL TEARS) 0.2-0.2-1 % solution ophthalmic solution Administer 1 drop to both eyes Every 1 (One) Hour As Needed for Dry Eyes. 1 bottle 2   • guaifenesin-codeine (GUAIFENESIN AC) 100-10 MG/5ML liquid Take 5 mL by mouth 2 (Two) Times a Day for 14 days. 180 mL 0   • HYDROcodone-homatropine (HYCODAN) 5-1.5 MG/5ML syrup Take 5 mL by mouth Every 6 (Six) Hours As Needed for Cough. 120 mL 0   • lactulose (CHRONULAC) 10 GM/15ML solution Take 30 mL by mouth Daily. (Patient taking differently: Take 20 g by mouth Daily As Needed.) 300 mL 1   • ondansetron (ZOFRAN) 8 MG tablet Take 1 tablet by mouth Every 8 (Eight) Hours As Needed for nausea or vomiting. 30 tablet 2   • predniSONE (DELTASONE) 10 MG tablet TAKE 1 TABLET BY MOUTH DAILY.  3   • Scopolamine (TRANSDERM-SCOP, 1.5 MG,) 1.5 MG/3DAYS patch Place 1 patch  "on the skin Every 72 (Seventy-Two) Hours. (Patient taking differently: Place 1 patch on the skin Every 72 (Seventy-Two) Hours. DUE TO BE CHANGED 06/03/2017) 24 patch 3   • sennosides-docusate sodium (SENOKOT-S) 8.6-50 MG tablet Take 2 tablets by mouth Daily. 60 tablet 5     Current Facility-Administered Medications on File Prior to Visit   Medication Dose Route Frequency Provider Last Rate Last Dose   • alteplase ((CATHFLO/ACTIVASE)) injection 2 mg  2 mg Intracatheter Once Yokasta N Head, APRN         ALLERGIES:    Allergies   Allergen Reactions   • Latex Rash       Objective      Vitals:    08/01/17 0855   BP: 110/70   Pulse: (!) 128   Resp: 18   Temp: 97.9 °F (36.6 °C)   SpO2: 97%  Comment: at rest   Weight: 121 lb 9.6 oz (55.2 kg)   Height: 66.73\" (169.5 cm)   PainSc: 7  Comment: all over     Current Status 8/1/2017   ECOG score 1     Physical Exam    GENERAL:  Thin cachectic female, struggling to breath intermittently gasping.  On 3 L O2 per NC. In wheelchair.  SKIN:  Warm, dry without rashes, purpura or petechiae.  1.5 x 1.5 cm left scalp metastasis  EYES:  Pupils equal, round and reactive to light.  EOMs intact.  Conjunctivae normal.  EARS:  Hearing intact.  NOSE:  Septum midline.  No excoriations or nasal discharge.  MOUTH:  Tongue is well-papillated; no stomatitis or ulcers.  Lips normal.  THROAT:  Oropharynx without lesions or exudates.  NECK:  Supple with good range of motion; no thyromegaly or masses, no JVD.  LYMPHATICS:  No cervical, no left supraclavicular node palpated, no axillary or inguinal adenopathy.  CHEST:  Lung entire left lung decreased breath -right lung with decreased sounds in the base.  CARDIAC:  Tachycardic rate, normal rhythm without murmurs, rubs or gallops. Normal S1,S2.  ABDOMEN:  Soft, nontender with no hepatosplenomegaly or masses.  EXTREMITIES:  No clubbing, cyanosis or edema.  NEUROLOGICAL:  Cranial Nerves II-XII grossly intact.  No focal neurological deficits.  PSYCHIATRIC:  Normal " affect and mood.          RECENT LABS:  Lab Results   Component Value Date    WBC 9.85 08/01/2017    HGB 10.2 (L) 08/01/2017    HCT 32.1 (L) 08/01/2017    MCV 89.2 08/01/2017     (H) 08/01/2017     RADIOGRAPHIC DATA PA/LAT CHEST X-RAY:  FINDINGS:  1. Interval progression with now complete opacity left hemithorax.  2. Abrupt termination of the left mainstem bronchus air column.  3. Progressive right basilar opacification consistent with atelectasis,  effusion and possible pneumonia.  4. Left chest tube remains in good position.  5. Right subclavian power port terminates in good position over the SVC.      IAssessment/Plan   1. Metastatic ER/OR negative HER-2 positive breast cancer to brain and liver and chest wall.  Progressed on Tykerb and Xeloda. Intolerant to Navelbine and Herceptin switched to Abraxane and Herceptin on 4/17/17. Mixed response with new disease at T11 and left adrenal and no response in the skull metastasis. New therapy with Gemzar/Herceptin initiated 7/11/17.    2. Increasing shortness of breath. CXR with recurrent pleural effusions and progression on the left. Patient will be admitted to the hospital for medical management.   -ADDENDUM: RN staff only able to remove 20 mL from pleurx catheter upon admission. Concerning for progression of disease in the lung. Will obtain STAT CT scans now and make further decsions thereafter. Discussed with Dr. De Luna who is rounding on [address] regarding need to discuss CODE status today.     3. Pericardial effusion.  Was previously resolved on last scans. However, we are concerned for rapid progression and scans will be ordered today for re-evaluation as noted.    4.  Pain secondary malignancy.  Not well controlled currently with Duragesic 25 mcg patch every 72 hours and Dilaudid 1mg every 2 hours. Will order IV Dilaudid in hospital.    5. Narcotic-induced constipation.  Currently controlled with Dulcolax and lactulose as needed.      Plan   1. Admit to  [facility]  2. STAT CT scans C/A/P today. Results to be called to Dr. De Luna and further decisions to be made at that time. CODE status to be discussed today per MD with patient.      Mariya Wheatley, APRN  8/1/2017      CC:

## 2017-08-01 NOTE — H&P
I have read and agree with the H&P written by RAZ Wallace today.    She has worsening dyspnea and hypoxia today and opacification of the left lung, which I suspect is related to disease progression in the lung rather than pleural effusion.  R pleural effusion noted, which thoracentesis could potentially help.    CT imaging has been requested for further evaluation.    Right now her O2 saturation is acceptable on 3L NC.  I am concerned about her respiratory status.     Palliative care may be appropriate.  I do not think that invasive ventilation is advisable.

## 2017-08-01 NOTE — PLAN OF CARE
Problem: Nutrition, Imbalanced: Inadequate Oral Intake (Adult)  Goal: Identify Related Risk Factors and Signs and Symptoms  Outcome: Outcome(s) achieved Date Met:  08/01/17

## 2017-08-01 NOTE — H&P
Subjective .     REASON FOR ADMISSION:    1. T3N2M1,  ER/DE negative, HER-2 positive breast cancer.   2. Biopsy-proven isolated   liver metastasis. Plan to do aggressive chemotherapy with TCH/Perjeta and reevaluate the liver after 3 cycles and continue 6 cycles of chemotherapy followed by Perjeta, Herceptin indefinitely as long as there is no recurrence.   3. Excellent response to 2 cycles of TCH/Perjeta.   4. Day 8 Herceptin missed cycle 2 because of anal fissure which needed to be operated on.   5. Near complete resolution with 4 cycles of TCH/Perjeta.   6. No further improvement, but no visible tumor in the chest and stable, barely detectable nodu le in the liver. Perjeta, Herceptin by themselves continued for now, with plans for surgery and radiation.   7. Negative PET scan after 6 cycles of TCH/Perjeta. Surgery on the left breast and axilla planned.   8. At the time of surgery, 12/31/2014, the patient h ad [id number] disease. The decision was made to do radiation to the axilla and supraclavicular areas and the liver lesion. Continue Perjeta and Herceptin indefinitely until there are signs of progression.   9. Skin lesion in left chest wall positive breast canc er. CT scans of chest, abdomen and pelvis showed no new disease on 02/10/2015. Radiation to the chest wall. Herceptin and Perjeta to continue until there is clear progression.   10. Stereotactic day radiation to solitary brain lesion completed. The patient remains on a Decadron taper.   11. Isolated brain met, irradiated on 07/10/2015 with focused radiation.   12. Progressive pulmonary metastasis and left supraclavicular nodes and brain met on 8/9/2016 switched to Kadcyla  13. Progressive disease in lymph nodes on Kadcyla switched to Tykerb and Xeloda in 1/17  14. Progressive disease noted on CT 3/21. Axillary lymph node biopsied, disease continues to be Her-2 positive.  15. Initiation of Abraxane/Herceptin 4/14/17.  16. Mixed response to  Abraxane/Herceptin provement in supraclavicular adenopathy and progression of pleural and pericardial effusions.  However new lesions seen at T11 and no response in scalp metastasis.  Therapy switched to Gemzar/Herceptin beginning 7/11/17.    HISTORY OF PRESENT ILLNESS:  The patient is a 56 y.o. year old female who is here for follow-up with the above-mentioned history.    History of Present Illness  The patient is a pleasant  56 y.o.  with metastatic HER-2 positive breast cancer to brain and bones and chest who returns today for cycle 2 of Gemzar/Herceptin.  The patient is gasping for air during our visit though she has 97% on 3 L of oxygen.  She states she has been getting progressively worse in this regard for the last 2 weeks, especially bad in the last 2 days, feeling like she cannot breathe.     The patient was quickly examined and noted to not be moving air in her bases which prompted a chest x-ray ordered.  X-ray showing recurrent moderate right pleural effusion and concern for large left pleural effusion and/or progressive disease in the left lung.     Patient also reporting pain all over despite fentanyl 25 µg patch and Dilaudid 1 mg every 2 hours.  Due to her distress, decision made to admit the patient for medical management.    Past Medical History:   Diagnosis Date   • Anemia     intermittent which responded to iron.    • Breast cancer     Left, ER/NV negative, HER-2 positive    • Liver metastasis     biopsy proven    • Metastasis to brain     irradiated on 7/10/2015 w/ focus radiation       ONCOLOGIC HISTORY:  (History from previous dates can be found in the separate document.)    No current facility-administered medications on file prior to visit.      Current Outpatient Prescriptions on File Prior to Visit   Medication Sig Dispense Refill   • bisacodyl (DULCOLAX) 5 MG EC tablet Take 10 mg by mouth Daily As Needed for Constipation (PT STS TAKES 2-3 PPRN).     • ciprofloxacin (CIPRO) 500 MG tablet  Take 1 tablet by mouth 2 (Two) Times a Day for 14 days. 28 tablet 0   • codeine 60 MG tablet Take 1 tablet by mouth Every 6 (Six) Hours As Needed (as needed for cough). 120 tablet 0   • fentaNYL (DURAGESIC) 25 MCG/HR patch Place 1 patch on the skin Every 72 (Seventy-Two) Hours. 10 patch 0   • Glycerin-Hypromellose- (ARTIFICIAL TEARS) 0.2-0.2-1 % solution ophthalmic solution Administer 1 drop to both eyes Every 1 (One) Hour As Needed for Dry Eyes. 1 bottle 2   • guaifenesin-codeine (GUAIFENESIN AC) 100-10 MG/5ML liquid Take 5 mL by mouth 2 (Two) Times a Day for 14 days. 180 mL 0   • HYDROcodone-homatropine (HYCODAN) 5-1.5 MG/5ML syrup Take 5 mL by mouth Every 6 (Six) Hours As Needed for Cough. 120 mL 0   • lactulose (CHRONULAC) 10 GM/15ML solution Take 30 mL by mouth Daily. (Patient taking differently: Take 20 g by mouth Daily As Needed.) 300 mL 1   • ondansetron (ZOFRAN) 8 MG tablet Take 1 tablet by mouth Every 8 (Eight) Hours As Needed for nausea or vomiting. 30 tablet 2   • predniSONE (DELTASONE) 10 MG tablet TAKE 1 TABLET BY MOUTH DAILY.  3   • Scopolamine (TRANSDERM-SCOP, 1.5 MG,) 1.5 MG/3DAYS patch Place 1 patch on the skin Every 72 (Seventy-Two) Hours. (Patient taking differently: Place 1 patch on the skin Every 72 (Seventy-Two) Hours. DUE TO BE CHANGED 06/03/2017) 24 patch 3   • sennosides-docusate sodium (SENOKOT-S) 8.6-50 MG tablet Take 2 tablets by mouth Daily. 60 tablet 5       ALLERGIES:     Allergies   Allergen Reactions   • Latex Rash       Social History     Social History   • Marital status:      Spouse name: Evaristo   • Number of children: 2   • Years of education: College     Occupational History   •  [employer]     Social History Main Topics   • Smoking status: Never Smoker   • Smokeless tobacco: Former User   • Alcohol use No   • Drug use: No   • Sexual activity: Defer     Other Topics Concern   • Not on file     Social History Narrative    She is   and lives with her . She works as a  for [employer].          Cancer-related family history includes Cancer in her other; Cancer (age of onset: 70) in her mother; Uterine cancer in her maternal grandmother.     Review of Systems  A comprehensive 14 point review of systems was performed and was negative except as mentioned.    Objective      Vitals:    08/01/17 0855   BP: 110/70   Pulse: (!) 128   Resp: 18   Temp: 97.9 °F (36.6 °C)   SpO2: 97%      Current Status 8/1/2017   ECOG score 1       Physical Exam    GENERAL:  Thin cachectic female, struggling to breath intermittently gasping.  On 3 L O2 per NC. In wheelchair.  SKIN:  Warm, dry without rashes, purpura or petechiae.  1.5 x 1.5 cm left scalp metastasis  EYES:  Pupils equal, round and reactive to light.  EOMs intact.  Conjunctivae normal.  EARS:  Hearing intact.  NOSE:  Septum midline.  No excoriations or nasal discharge.  MOUTH:  Tongue is well-papillated; no stomatitis or ulcers.  Lips normal.  THROAT:  Oropharynx without lesions or exudates.  NECK:  Supple with good range of motion; no thyromegaly or masses, no JVD.  LYMPHATICS:  No cervical, no left supraclavicular node palpated, no axillary or inguinal adenopathy.  CHEST:  Lung entire left lung decreased breath -right lung with decreased sounds in the base.  CARDIAC:  Tachycardic rate, normal rhythm without murmurs, rubs or gallops. Normal S1,S2.  ABDOMEN:  Soft, nontender with no hepatosplenomegaly or masses.  EXTREMITIES:  No clubbing, cyanosis or edema.  NEUROLOGICAL:  Cranial Nerves II-XII grossly intact.  No focal neurological deficits.  PSYCHIATRIC:  Normal affect and mood.       RECENT LABS:  Hematology WBC   Date Value Ref Range Status   08/01/2017 9.85 4.00 - 10.00 10*3/mm3 Final     RBC   Date Value Ref Range Status   08/01/2017 3.60 (L) 3.90 - 5.00 10*6/mm3 Final     Hemoglobin   Date Value Ref Range Status   08/01/2017 10.2 (L) 11.5 - 14.9 g/dL Final     Hematocrit    Date Value Ref Range Status   08/01/2017 32.1 (L) 34.0 - 45.0 % Final     Platelets   Date Value Ref Range Status   08/01/2017 596 (H) 150 - 375 10*3/mm3 Final      RADIOGRAPHIC DATA PA/LAT CHEST X-RAY:  FINDINGS:  1. Interval progression with now complete opacity left hemithorax.  2. Abrupt termination of the left mainstem bronchus air column.  3. Progressive right basilar opacification consistent with atelectasis,  effusion and possible pneumonia.  4. Left chest tube remains in good position.  5. Right subclavian power port terminates in good position over the SVC.          Assessment/Plan   1. Metastatic ER/AL negative HER-2 positive breast cancer to brain and liver and chest wall.  Progressed on Tykerb and Xeloda. Intolerant to Navelbine and Herceptin switched to Abraxane and Herceptin on 4/17/17. Mixed response with new disease at T11 and left adrenal and no response in the skull metastasis. New therapy with Gemzar/Herceptin initiated 7/11/17.     2. Increasing shortness of breath. CXR with recurrent pleural effusions and progression on the left. Patient will be admitted to the hospital for medical management.                        -ADDENDUM: RN staff only able to remove 20 mL from pleurx catheter upon admission. Concerning for progression of disease in the lung. Will obtain STAT CT scans now and make further decsions thereafter. Discussed with Dr. De Luna who is rounding on [address] regarding need to discuss CODE status today.      3. Pericardial effusion.  Was previously resolved on last scans. However, we are concerned for rapid progression and scans will be ordered today for re-evaluation as noted.     4.  Pain secondary malignancy.  Not well controlled currently with Duragesic 25 mcg patch every 72 hours and Dilaudid 1mg every 2 hours. Will order IV Dilaudid in hospital.     5. Narcotic-induced constipation.  Currently controlled with Dulcolax and lactulose as needed.        Plan   1. Admit to [facility]  2. STAT  CT scans C/A/P today. Results to be called to Dr. De Luna and further decisions to be made at that time. CODE status to be discussed today per MD with patient.

## 2017-08-02 NOTE — NURSING NOTE
Status post thoracentesis with dressing dry and intact to right upper back.  Spouse at bedside. Patient nonverbal, nods head.

## 2017-08-02 NOTE — PROGRESS NOTES
Malnutrition Severity Assessment    Patient Name:  Yury Mott  YOB: 1960  MRN: [id number]  Admit Date:  8/1/2017    Patient meets criteria for : Moderate malnutrition      Malnutrition Type: Chronic Illness Malnutrition     Malnutrition Type (last 8 hours)      Malnutrition Severity Assessment       08/02/17 1035    Malnutrition Severity Assessment    Malnutrition Type Chronic Illness Malnutrition          Weight Status         Most Recent Value    Weight Loss  Mod (5% / 1 mo)      Energy Intake Status         Most Recent Value    Energy Intake  Mod (<75% / > or equal to 1 mo)              Electronically signed by:  Stephanie Stark RD  08/02/17 10:36 AM

## 2017-08-02 NOTE — CONSULTS
Inpatient Consult to Thoracic Surgery  Consult performed by: LOCO KEN  Consult ordered by: ZAKIA PEDERSON II  Reason for consult: Possible PleurX catheter and bronchial stent placement  Assessment/Recommendations: No plans for PleurX catheter at this time due to only needing one thoracentesis so far on the right side.  Patient would not benefit from bronchial stent placement.          Patient Care Team:  Ted Manley MD as PCP - General (Hematology and Oncology)  Ted Manley MD as Consulting Physician (Hematology and Oncology)  Steven Barker MD as Referring Physician (Breast Surgery)    Chief Complaint: SOA    Subjective     History of Present Illness  Yury Mott is a 56 year old female known to our service after placement of left sided PleurX catheter for malignant pleural effusion.  She has history of metastatic breast cancer to brain, liver, and lung.  Her condition has slowly been declining.  Her left PleurX catheter stopped draining about one month ago.  She was seen by the nurse practitioner in the oncology office yesterday and was found to have worsening shortness of breath.  She was admitted to the hospital for further management.  During her workup she had a CT scan of the chest, abdomen and pelvis.  The chest CT showed a left mainstem bronchus obstruction in the left lung being completely consolidated.  There was also noted large right pleural effusion with new hepatic metastases.  We were consulted due to these findings.  At time of consult today, she has just recently received pain medication and is sleeping comfortably.  Information obtained from chart.   at bedside provided needed information concerning patient's status.     Review of Systems   Unable to perform ROS: Acuity of condition    Recently received pain medication and difficult to keep awake to answer questions.     Patient Active Problem List   Diagnosis   • Breast cancer metastasized to brain   • Breast  cancer metastasized to liver   • Encounter for long-term (current) use of high-risk medication   • Fitting and adjustment of vascular catheter   • Intractable pain   • Nausea and vomiting   • Malignant neoplasm metastatic to left lung   • Leukocytopenia   • Anemia associated with chemotherapy   • Dyspnea, unspecified   • Hyponatremia syndrome   • Bilateral pleural effusion   • Chemotherapy induced neutropenia   • Malignant neoplasm of other specified sites of female breast   • Cancer-related pain   • Pleuritic chest pain   • Pericardial effusion without cardiac tamponade   • Malignant neoplasm of left female breast, unspecified site of breast   • Malignant neoplasm of right female breast, unspecified site of breast     Past Medical History:   Diagnosis Date   • Anemia     intermittent which responded to iron.    • Breast cancer     Left, ER/MA negative, HER-2 positive    • Liver metastasis     biopsy proven    • Metastasis to brain     irradiated on 7/10/2015 w/ focus radiation     Past Surgical History:   Procedure Laterality Date   • CARDIAC CATHETERIZATION N/A 6/5/2017    Procedure: Pericardiocentesis;  Surgeon: Hemant Perez MD;  Location: [facility] CATH INVASIVE LOCATION;  Service:    • CARDIAC CATHETERIZATION  6/5/2017    Procedure: Case Abort;  Surgeon: Hemant Perez MD;  Location: [facility] CATH INVASIVE LOCATION;  Service:    • MASTECTOMY Left 12/30/2014   • PLEURAL CATHETER INSERTION Left 5/1/2017    Procedure: PLEURX CATHETER INSERTION;  Surgeon: Asael Arriaga MD;  Location: [facility] MAIN OR;  Service:    • PORTACATH PLACEMENT  06/2014     Family History   Problem Relation Age of Onset   • Uterine cancer Maternal Grandmother      mid 50's   • Cancer Other    • Other Other      Cardiac disorder   • Cancer Mother 70     Face   • Anemia Mother    • Heart disease Father      Social History     Social History   • Marital status:      Spouse name: Evaristo   • Number of children: 2   • Years  of education: College     Occupational History   •  [employer]     Social History Main Topics   • Smoking status: Never Smoker   • Smokeless tobacco: Former User   • Alcohol use No   • Drug use: No   • Sexual activity: Defer     Other Topics Concern   • Not on file     Social History Narrative    She is  and lives with her . She works as a  for [employer].      Facility-Administered Medications Prior to Admission   Medication Dose Route Frequency Provider Last Rate Last Dose   • alteplase ((CATHFLO/ACTIVASE)) injection 2 mg  2 mg Intracatheter Once Yokasta N Head, APRN         Prescriptions Prior to Admission   Medication Sig Dispense Refill Last Dose   • bisacodyl (DULCOLAX) 5 MG EC tablet Take 10 mg by mouth Daily As Needed for Constipation (PT STS TAKES 2-3 PPRN).   2017 at Unknown time   • [] ciprofloxacin (CIPRO) 500 MG tablet Take 1 tablet by mouth 2 (Two) Times a Day for 14 days. 28 tablet 0 2017 at Unknown time   • codeine 60 MG tablet Take 1 tablet by mouth Every 6 (Six) Hours As Needed (as needed for cough). 120 tablet 0 2017 at Unknown time   • fentaNYL (DURAGESIC) 25 MCG/HR patch Place 1 patch on the skin Every 72 (Seventy-Two) Hours. 10 patch 0 2017 at Unknown time   • Glycerin-Hypromellose- (ARTIFICIAL TEARS) 0.2-0.2-1 % solution ophthalmic solution Administer 1 drop to both eyes Every 1 (One) Hour As Needed for Dry Eyes. 1 bottle 2 Past Month at Unknown time   • [] guaifenesin-codeine (GUAIFENESIN AC) 100-10 MG/5ML liquid Take 5 mL by mouth 2 (Two) Times a Day for 14 days. 180 mL 0 2017 at Unknown time   • lactulose (CHRONULAC) 10 GM/15ML solution Take 30 mL by mouth Daily. (Patient taking differently: Take 20 g by mouth Daily As Needed.) 300 mL 1 Past Week at Unknown time   • ondansetron (ZOFRAN) 8 MG tablet Take 1 tablet by mouth Every 8 (Eight) Hours As Needed for nausea or vomiting. 30 tablet 2  7/31/2017 at Unknown time   • predniSONE (DELTASONE) 10 MG tablet TAKE 1 TABLET BY MOUTH DAILY.  3 7/31/2017 at Unknown time   • Scopolamine (TRANSDERM-SCOP, 1.5 MG,) 1.5 MG/3DAYS patch Place 1 patch on the skin Every 72 (Seventy-Two) Hours. (Patient taking differently: Place 1 patch on the skin Every 72 (Seventy-Two) Hours. DUE TO BE CHANGED 06/03/2017) 24 patch 3 7/31/2017 at Unknown time   • HYDROcodone-homatropine (HYCODAN) 5-1.5 MG/5ML syrup Take 5 mL by mouth Every 6 (Six) Hours As Needed for Cough. 120 mL 0 Unknown at Unknown time   • sennosides-docusate sodium (SENOKOT-S) 8.6-50 MG tablet Take 2 tablets by mouth Daily. 60 tablet 5 Taking     Allergies   Allergen Reactions   • Latex Rash       Objective      Vital Signs  Temp:  [96.8 °F (36 °C)-97.8 °F (36.6 °C)] 96.8 °F (36 °C)  Heart Rate:  [120-129] 124  Resp:  [16-20] 16  BP: ()/(67-78) 107/72    Intake & Output (last day)       08/01 0701 - 08/02 0700 08/02 0701 - 08/03 0700    P.O. 200     Total Intake(mL/kg) 200 (3.5)     Urine (mL/kg/hr) 300     Other  1100 (2.5)    Total Output 300 1100    Net -100 -1100          Unmeasured Urine Occurrence 1 x           Physical Exam   Constitutional: She appears lethargic. She has a sickly appearance.   Cardiovascular: Tachycardia present.    Pulmonary/Chest: Effort normal. No respiratory distress.   O2 at 3L/M per N/C with O2 sats 99%.  Respirations even and unlabored.    Neurological: She appears lethargic.   Skin: There is pallor.       Results Review:    I reviewed the patient's new clinical results.  I reviewed the patient's new imaging results and agree with the interpretation.    Imaging Results (last 24 hours)     Procedure Component Value Units Date/Time    US Thoracentesis Right [[id number]] Collected:  08/02/17 1007     Updated:  08/02/17 1010    Narrative:       ULTRASOUND-GUIDED THORACENTESIS RIGHT PLEURAL CAVITY     History: Female who is 56 years-old, with metastatic breast cancer and  right  pleural effusion with shortness of breath. Thoracentesis was  requested.     Procedure, including risk of hemorrhage, infection, recurrence,  pneumothorax, chest tube placement discussed with the patient prior to  examination. Consent was obtained.     Procedure: Patient was allowed to remain in sitting upright position and  was approached posteriorly with real-time ultrasound guidance.  Localization was performed. Confirmatory images were obtained.     The patient was prepped in the usual sterile fashion. Following adequate  local anesthesia with 1% lidocaine, a thoracentesis needle set was  advanced to the posterior aspect of the right pleural cavity.     A total of 1100 cc of pleural fluid was aspirated into the Vacutainer  without difficulty nor immediate post procedure complication.     This report was finalized on 8/2/2017 10:07 AM by Dr. Nikolai Baxter MD.       CT Abdomen Pelvis With Contrast [[id number]] Collected:  08/01/17 1439     Updated:  08/02/17 1255    Narrative:       CT CHEST, ABDOMEN, AND PELVIS WITH IV CONTRAST     HISTORY: 56-year-old female with metastatic breast cancer. Progressive  shortness of breath. Left mastectomy.     TECHNIQUE: 3 mm images were obtained through the chest, abdomen, and  pelvis after the administration of IV contrast. Compared with CTs from  07/05/2017.     FINDINGS:  CHEST: The left mainstem bronchus is truncated just proximal to the left  hilum and the left lung is now completely consolidated. There is a small  loculated left pleural effusion which appears largely unchanged. There  is a chest tube within the superior margin of the pleural effusion. The  tiny right pleural effusion has markedly increased in size to a large  effusion. In addition to compressive atelectatic change, there is  partial collapse of the right middle lobe. The right MediPort catheter  tip is within the right atrium. The small pericardial effusion and the  irregular pericardial thickening appears  stable.     ABDOMEN/PELVIS: There is significant breathing artifact, but there are  clearly many new liver lesions scattered throughout the liver. Some of  the lesions are subtle. One of the more clearly delineated lesions in  the posterior hepatic segment measures 2 cm. There are lesions scattered  throughout the liver. There has been significant interval increase in  the size and number of nodes at the glenys hepatis and retroperitoneum. A  2.3 x 1.3 cm aortocaval node was previously normal in size. There has  been increase in size of the left adrenal lesion currently measuring 2.0  x 1.4 cm, previously 1.4 x 1.0 cm. There is a large volume of stool  within the right aspect of the colon. No acute bowel abnormality is  seen. Uterus and adnexa appear unremarkable.       Impression:       The left mainstem bronchus is obstructed and the left lung  is now completely consolidated. There has also been development of a  large right pleural effusion and there are many new hepatic metastases.  The left adrenal metastasis has increased in size and there has been  development of lymphadenopathy within the abdomen which is likely  metastatic.     This report was finalized on 8/2/2017 12:51 PM by Dr. Bisi Orellana MD.       CT Chest With Contrast [[id number]] Collected:  08/01/17 1439     Updated:  08/02/17 1255    Narrative:       CT CHEST, ABDOMEN, AND PELVIS WITH IV CONTRAST     HISTORY: 56-year-old female with metastatic breast cancer. Progressive  shortness of breath. Left mastectomy.     TECHNIQUE: 3 mm images were obtained through the chest, abdomen, and  pelvis after the administration of IV contrast. Compared with CTs from  07/05/2017.     FINDINGS:  CHEST: The left mainstem bronchus is truncated just proximal to the left  hilum and the left lung is now completely consolidated. There is a small  loculated left pleural effusion which appears largely unchanged. There  is a chest tube within the superior margin of the  pleural effusion. The  tiny right pleural effusion has markedly increased in size to a large  effusion. In addition to compressive atelectatic change, there is  partial collapse of the right middle lobe. The right MediPort catheter  tip is within the right atrium. The small pericardial effusion and the  irregular pericardial thickening appears stable.     ABDOMEN/PELVIS: There is significant breathing artifact, but there are  clearly many new liver lesions scattered throughout the liver. Some of  the lesions are subtle. One of the more clearly delineated lesions in  the posterior hepatic segment measures 2 cm. There are lesions scattered  throughout the liver. There has been significant interval increase in  the size and number of nodes at the glenys hepatis and retroperitoneum. A  2.3 x 1.3 cm aortocaval node was previously normal in size. There has  been increase in size of the left adrenal lesion currently measuring 2.0  x 1.4 cm, previously 1.4 x 1.0 cm. There is a large volume of stool  within the right aspect of the colon. No acute bowel abnormality is  seen. Uterus and adnexa appear unremarkable.       Impression:       The left mainstem bronchus is obstructed and the left lung  is now completely consolidated. There has also been development of a  large right pleural effusion and there are many new hepatic metastases.  The left adrenal metastasis has increased in size and there has been  development of lymphadenopathy within the abdomen which is likely  metastatic.     This report was finalized on 8/2/2017 12:51 PM by Dr. Bisi Orellana MD.             Lab Results:  Lab Results (last 24 hours)     Procedure Component Value Units Date/Time    CBC & Differential [[id number]] Collected:  08/02/17 0457    Specimen:  Blood Updated:  08/02/17 0516    Narrative:       The following orders were created for panel order CBC & Differential.  Procedure                               Abnormality         Status                      ---------                               -----------         ------                     CBC Auto Differential[[id number]]        Abnormal            Final result                 Please view results for these tests on the individual orders.    CBC Auto Differential [[id number]]  (Abnormal) Collected:  08/02/17 0457    Specimen:  Blood Updated:  08/02/17 0516     WBC 8.35 10*3/mm3      RBC 3.54 (L) 10*6/mm3      Hemoglobin 9.9 (L) g/dL      Hematocrit 31.9 (L) %      MCV 90.1 fL      MCH 28.0 pg      MCHC 31.0 (L) g/dL      RDW 19.1 (H) %      RDW-SD 62.5 (H) fl      MPV 8.6 fL      Platelets 553 (H) 10*3/mm3      Neutrophil % 74.9 %      Lymphocyte % 8.4 (L) %      Monocyte % 15.9 (H) %      Eosinophil % 0.5 %      Basophil % 0.1 %      Immature Grans % 0.2 %      Neutrophils, Absolute 6.25 10*3/mm3      Lymphocytes, Absolute 0.70 (L) 10*3/mm3      Monocytes, Absolute 1.33 (H) 10*3/mm3      Eosinophils, Absolute 0.04 10*3/mm3      Basophils, Absolute 0.01 10*3/mm3      Immature Grans, Absolute 0.02 10*3/mm3     Protime-INR [[id number]]  (Abnormal) Collected:  08/02/17 0457    Specimen:  Blood Updated:  08/02/17 0520     Protime 15.9 (H) Seconds      INR 1.32 (H)    Comprehensive Metabolic Panel [[id number]]  (Abnormal) Collected:  08/02/17 0457    Specimen:  Blood Updated:  08/02/17 0537     Glucose 102 (H) mg/dL      BUN 21 (H) mg/dL      Creatinine 0.56 (L) mg/dL      Sodium 133 (L) mmol/L      Potassium 4.8 mmol/L      Chloride 90 (L) mmol/L      CO2 28.6 mmol/L      Calcium 8.9 mg/dL      Total Protein 5.5 (L) g/dL      Albumin 2.80 (L) g/dL      ALT (SGPT) 28 U/L      AST (SGOT) 45 (H) U/L      Alkaline Phosphatase 154 (H) U/L      Total Bilirubin 0.6 mg/dL      eGFR Non African Amer 112 mL/min/1.73      Globulin 2.7 gm/dL      A/G Ratio 1.0 g/dL      BUN/Creatinine Ratio 37.5 (H)     Anion Gap 14.4 mmol/L               Assessment/Plan     Active Problems:    * No active hospital problems.  *      Assessment:    Condition: In serious condition.  Unchanged.   (Metastatic breast cancer  Malignant pleural effusion).     Plan:   (After evaluation, a CT scan of chest per Dr. Arriaga, no surgical intervention recommended at this time.  Initially recommended a right-sided ultrasound thoracentesis, which is artery been completed for today.  1100 mL of fluid was removed at that time.  We will repeat a chest x-ray in the a.m.  No plans for Pleurx catheter placement at this time due to first occurrence of effusion on the right side.  Recommend serial thoracentesis at this time.  If it appears the effusion continually reaccumulate we can always schedule her for outpatient placement of a Pleurx catheter on the right side.  The left Pleurx catheter has minimal drainage due to minimal effusion noted on the left side.  The majority of her problems is the extensive lung mass in her left lung , which is obstructing the left mainstem bronchus.  She would not benefit from any bronchial stent placement due to the occlusion being caused by the presence of the lung mass. ).       I discussed the patients findings and our recommendations with family    Thank you for this consult and allowing us to participate in the care of your patient.  We will follow along with you during this hospitalization.       Yokasta Luo, APRN  [facility]  08/02/17  2:50 PM

## 2017-08-02 NOTE — PROGRESS NOTES
Subjective     REASON FOR FOLLOWUP/CHIEF COMPLAINT: Shortness of air     HISTORY OF PRESENT ILLNESS:   She has declined over 24 hours.  She spent most of this time sleeping.  Shortness of air has improved some since the thoracentesis.  Denies nausea.  Pain is controlled with narcotics.    Past Medical History, Past Surgical History, Social History, Family History have been reviewed and are without significant changes except as mentioned.    Review of Systems   GENERAL: No change in appetite or weight;   No fevers, chills, sweats.    SKIN: No nonhealing lesions.   No rashes.  HEME/LYMPH: No easy bruising, bleeding.   No swollen nodes.   EYES: No vision changes or diplopia.   ENT: No tinnitus, hearing loss, gum bleeding, epistaxis, hoarseness or dysphagia.   RESPIRATORY: see HPI   CVS: No chest pain, palpitations, orthopnea, dyspnea on exertion or PND.   GI: No melena or hematochezia.   No abdominal pain.  No nausea, vomiting, constipation, diarrhea  : No lower tract obstructive symptoms, dysuria or hematuria.   MUSCULOSKELETAL: No bone pain.  No joint stiffness.   NEUROLOGICAL: No global weakness, loss of consciousness or seizures.   PSYCHIATRIC: No increased nervousness, mood changes or depression.     Medications:  The current medication list was reviewed in the EMR    ALLERGIES:    Allergies   Allergen Reactions   • Latex Rash       Objective      Vitals:    08/02/17 0859 08/02/17 0930 08/02/17 1000 08/02/17 1133   BP: 118/72 114/68  107/72   BP Location: Left arm Right arm  Right arm   Patient Position: Lying Lying  Lying   Pulse: (!) 127 (!) 128  (!) 124   Resp: 20 16  16   Temp:   Comment: family refusing vitals at this time 96.8 °F (36 °C)   TempSrc:    Axillary   SpO2: 97% 99%  99%   Weight:       Height:         Physical Exam    GENERAL:  Well-developed, well-nourished in no acute distress.   SKIN:  Warm, dry without rashes, purpura or petechiae.  EYES:  Pupils equal, round and reactive to light.  EOMs  intact.  Conjunctivae normal.  EARS:  Hearing intact.  NOSE:  Septum midline.  No excoriations or nasal discharge.  MOUTH:  Tongue is well-papillated; no stomatitis or ulcers.  Lips normal.  THROAT:  Oropharynx without lesions or exudates.  NECK:  Supple with good range of motion; no thyromegaly or masses, no JVD.  CHEST:  Lungs clear to auscultation.  Decreased airflow left side   CARDIAC:  Regular rate and rhythm without murmurs, rubs or gallops. Normal S1,S2.  ABDOMEN:  Soft, nontender with no hepatosplenomegaly or masses.  EXTREMITIES:  No clubbing, cyanosis or edema.  NEUROLOGICAL:  Cranial Nerves II-XII grossly intact.  No focal neurological deficits.  PSYCHIATRIC:  Normal affect and mood.  RECENT LABS:  WBC   Date Value Ref Range Status   08/02/2017 8.35 4.50 - 10.70 10*3/mm3 Final   08/01/2017 9.85 4.00 - 10.00 10*3/mm3 Final     Hemoglobin   Date Value Ref Range Status   08/02/2017 9.9 (L) 11.9 - 15.5 g/dL Final   08/01/2017 10.2 (L) 11.5 - 14.9 g/dL Final     Platelets   Date Value Ref Range Status   08/02/2017 553 (H) 140 - 500 10*3/mm3 Final   08/01/2017 596 (H) 150 - 375 10*3/mm3 Final           Assessment/Plan     ASSESSMENT/PLAN:  *Metastatic breast cancer.  ER/OK negative.  HER-2 positive.  Metastatic to brain, liver, chest wall.  Progressed on Tykerb and Xeloda. Intolerant to Navelbine and Herceptin switched to Abraxane and Herceptin on 4/17/17. Mixed response with new disease at T11 and left adrenal and no response in the skull metastasis. New therapy with Gemzar/Herceptin initiated 7/11/17.    *Bilateral malignant pleural effusions.  Pleurx on left side.  Large right pleural effusion.  1100 mL removed by thoracentesis 8-17.    *Hypoxia.  Likely due to pleural effusion and left mainstem bronchial obstruction/complete consolidation.  Dr. Arriaga was evaluated and does not feel her bronchial stents would be helpful.  Extensive consolidation and lymphangitic spread due to progression of malignancy.    Bart states he could place a Pleurx if recurrence of the right-sided pleural effusion becomes an issue.    *Pain due to malignancy.  In last 24 hours used: 8 doses of 0.5 mg IV Dilaudid and 1 dose of 1 mg IV Dilaudid      *FULL CODE.  Patient wants this because she wants her son who lives out of state to be able to compensate goodbye or she is on the ventilator, after that, she wants her family to turn off the ventilator and let her past naturally.  I once again asked patient and  to discuss this with the son as I would recommend a DO NOT RESUSCITATE/allow natural death status.  I also recommended the son come to see her as she has significantly declined overnight.  I explained to the patient and  I suspect this will evolve into an admission for terminal care.  They voiced understanding.    Plan  · We will leave narcotics as IV as I suspect this will evolve into a terminal care admission  · I once again encouraged the family to think more about a DNR status.    · Chest x-ray in the morning has been ordered by Dr. Arriaga.  We will see how much fluid remains.

## 2017-08-02 NOTE — NURSING NOTE
In radiology for right thoracentesis.  Dr. Baxter spoke with patient and  and marked correct side.

## 2017-08-02 NOTE — PLAN OF CARE
Problem: Patient Care Overview (Adult)  Goal: Plan of Care Review  Outcome: Ongoing (interventions implemented as appropriate)    08/02/17 0254   Coping/Psychosocial Response Interventions   Plan Of Care Reviewed With patient   Outcome Evaluation   Outcome Summary/Follow up Plan Full code at this time. Waiting for son to come in. Doctor talked about code status today. 02 at 3 iters nc in place with cont. pulse ox. very weak with ambulation. turning every 2 hours iv Dilaudid and Ativan given. To have Thoracentesis today.         Problem: Pressure Ulcer Risk (Josse Scale) (Adult,Obstetrics,Pediatric)  Goal: Skin Integrity  Outcome: Ongoing (interventions implemented as appropriate)    Problem: Fall Risk (Adult)  Goal: Absence of Falls  Outcome: Ongoing (interventions implemented as appropriate)    Problem: Nutrition, Imbalanced: Inadequate Oral Intake (Adult)  Goal: Improved Oral Intake  Outcome: Ongoing (interventions implemented as appropriate)

## 2017-08-02 NOTE — CONSULTS
"Adult Nutrition  Assessment/PES    Patient Name:  Yury Mott  YOB: 1960  MRN: [id number]  Admit Date:  8/1/2017    Assessment Date:  8/2/2017        Reason for Assessment       08/02/17 1032    Reason for Assessment    Reason For Assessment/Visit identified at risk by screening criteria;nurse/nurse practitioner consult    Diagnosis --   pleural effusion                Anthropometrics       08/02/17 1033    Anthropometrics (Special Considerations)    Height Used for Calculations 1.702 m (5' 7\")    Weight Used for Calculations 56.7 kg (125 lb)    RD Calculated     RD Calculated % IBW 92    RD Calculated BMI (kg/m2) 19.6    Usual Body Weight (UBW)    Weight Loss 2.268 kg (5 lb)    Weight Loss Time Frame 2 months    Body Mass Index (BMI)    BMI Grade 17 - 19 low grade I            Labs/Tests/Procedures/Meds       08/02/17 1034    Labs/Tests/Procedures/Meds    Diagnostic Test/Procedure Review reviewed    Labs/Tests Review Reviewed;Glucose;Na+;Platlets    Medication Review Reviewed, pertinent    Significant Vitals reviewed            Physical Findings       08/02/17 1034    Physical Findings/Assessment    Additional Documentation --   B=14            Estimated/Assessed Needs       08/02/17 1034    Calculation Measurements    Weight Used For Calculations 56.7 kg (125 lb)    Height Used for Calculations 1.702 m (5' 7\")    Estimated/Assessed Energy Needs    Energy Need Method Kcal/kg    kcal/kg 25    25 Kcal/Kg (kcal) 1417.5    Estimated/Assessed Protein Needs    Weight Used for Protein Calculation 56.7 kg (125 lb)    Protein (gm/kg) 1.0    1.0 Gm Protein (gm) 56.7    Estimated/Assessed Fluid Needs    Fluid Need Method RDA method    RDA Method (mL)  1500            Nutrition Prescription Ordered       08/02/17 1034    Nutrition Prescription PO    Current PO Diet Regular            Evaluation of Received Nutrient/Fluid Intake       08/02/17 1035    PO Evaluation    Number of Meals 1    % PO Intake 25 "              Malnutrition Severity Assessment       08/02/17 1035    Malnutrition Severity Assessment    Malnutrition Type Chronic Illness Malnutrition    Weight Status (Chronic)    Weight Loss Mod (5% / 1 mo)    Energy Intake Status (Chronic)    Energy Intake Mod (<75% / > or equal to 1 mo)    Criteria Met (Must meet criteria for severity in at least 2 of these categories: M Wasting, Fat Loss, Fluid, Secondary Signs, Wt. Status, Intake)    Patient meets criteria for  Moderate malnutrition          Problem/Interventions:        Problem 1       08/02/17 1035    Nutrition Diagnoses Problem 1    Problem 1 Malnutrition    Etiology (related to) MNT for Treatment/Condition    Signs/Symptoms (evidenced by) Report of Mnimal PO Intake;Unintended Weight Change    Unintended Weight Change Loss    Number of Pounds Lost 5#    Weight loss time period 1 month                    Intervention Goal       08/02/17 1036    Intervention Goal    General Maintain nutrition    PO Tolerate PO;Increase intake    Weight Maintain weight            Nutrition Intervention       08/02/17 1036    Nutrition Intervention    RD/Tech Action Follow Tx progress;Care plan reviewd;Interview for preference;Encourage intake;Supplement provided            Nutrition Prescription       08/02/17 1036    Nutrition Prescription PO    PO Prescription Begin/change supplement    Supplement Ensure    Supplement Frequency 3 times a day    New PO Prescription Ordered? Yes            Education/Evaluation       08/02/17 1036    Education    Education Will Instruct as appropriate    Monitor/Evaluation    Monitor Per protocol    Education Follow-up Reinforce PRN        Electronically signed by:  Stephanie Stark RD  08/02/17 10:36 AM

## 2017-08-03 NOTE — PLAN OF CARE
Problem: Patient Care Overview (Adult)  Goal: Plan of Care Review  Outcome: Ongoing (interventions implemented as appropriate)    08/03/17 0332   Coping/Psychosocial Response Interventions   Plan Of Care Reviewed With patient;spouse   Outcome Evaluation   Outcome Summary/Follow up Plan Awaiting for son from [place] in  to be in [place] today at 3;45 pm. Remains a full code now, but may change status after son arrives. IV dilaudid 0.5 given with IV Ativan at 0200. Remains soa. on cont. pulse ox. HR tachy.  and Son at bedside.   Patient Care Overview   Progress no change         Problem: Pressure Ulcer Risk (Josse Scale) (Adult,Obstetrics,Pediatric)  Goal: Skin Integrity  Outcome: Ongoing (interventions implemented as appropriate)    Problem: Fall Risk (Adult)  Goal: Absence of Falls  Outcome: Ongoing (interventions implemented as appropriate)    Problem: Nutrition, Imbalanced: Inadequate Oral Intake (Adult)  Goal: Improved Oral Intake  Outcome: Ongoing (interventions implemented as appropriate)

## 2017-08-03 NOTE — PROGRESS NOTES
"    Chief Complaint: Pleural effusion    Subjective:  Symptoms:  Worsening.  She reports shortness of breath and weakness.    Activity level: Impaired due to weakness.    Pain:  Pain is well controlled and requiring pain medication.        Vital Signs:  Temp:  [96.8 °F (36 °C)-97.9 °F (36.6 °C)] 97.9 °F (36.6 °C)  Heart Rate:  [124-135] 128  Resp:  [16-18] 18  BP: ()/(61-72) 101/69    Intake & Output (last day)       08/02 0701 - 08/03 0700 08/03 0701 - 08/04 0700    P.O.      Total Intake(mL/kg)      Urine (mL/kg/hr) 0 (0)     Other 1100 (0.8)     Total Output 1100      Net -1100            Unmeasured Urine Occurrence 1 x           Objective:  General Appearance:  Ill-appearing.    Vital signs: (most recent): Blood pressure 101/69, pulse (!) 128, temperature 97.9 °F (36.6 °C), temperature source Axillary, resp. rate 18, height 67\" (170.2 cm), weight 125 lb 3.2 oz (56.8 kg), SpO2 98 %.  No fever.    Lungs:  Increased effort.    Heart: Tachycardia.    Neurological: (Lethargic).    Skin:  Pale.                Results Review:     I reviewed the patient's new clinical results.    Imaging Results (last 24 hours)     Procedure Component Value Units Date/Time    CT Abdomen Pelvis With Contrast [[id number]] Collected:  08/01/17 1439     Updated:  08/02/17 1255    Narrative:       CT CHEST, ABDOMEN, AND PELVIS WITH IV CONTRAST     HISTORY: 56-year-old female with metastatic breast cancer. Progressive  shortness of breath. Left mastectomy.     TECHNIQUE: 3 mm images were obtained through the chest, abdomen, and  pelvis after the administration of IV contrast. Compared with CTs from  07/05/2017.     FINDINGS:  CHEST: The left mainstem bronchus is truncated just proximal to the left  hilum and the left lung is now completely consolidated. There is a small  loculated left pleural effusion which appears largely unchanged. There  is a chest tube within the superior margin of the pleural effusion. The  tiny right pleural " effusion has markedly increased in size to a large  effusion. In addition to compressive atelectatic change, there is  partial collapse of the right middle lobe. The right MediPort catheter  tip is within the right atrium. The small pericardial effusion and the  irregular pericardial thickening appears stable.     ABDOMEN/PELVIS: There is significant breathing artifact, but there are  clearly many new liver lesions scattered throughout the liver. Some of  the lesions are subtle. One of the more clearly delineated lesions in  the posterior hepatic segment measures 2 cm. There are lesions scattered  throughout the liver. There has been significant interval increase in  the size and number of nodes at the glenys hepatis and retroperitoneum. A  2.3 x 1.3 cm aortocaval node was previously normal in size. There has  been increase in size of the left adrenal lesion currently measuring 2.0  x 1.4 cm, previously 1.4 x 1.0 cm. There is a large volume of stool  within the right aspect of the colon. No acute bowel abnormality is  seen. Uterus and adnexa appear unremarkable.       Impression:       The left mainstem bronchus is obstructed and the left lung  is now completely consolidated. There has also been development of a  large right pleural effusion and there are many new hepatic metastases.  The left adrenal metastasis has increased in size and there has been  development of lymphadenopathy within the abdomen which is likely  metastatic.     This report was finalized on 8/2/2017 12:51 PM by Dr. Bisi Orellana MD.       CT Chest With Contrast [[id number]] Collected:  08/01/17 1439     Updated:  08/02/17 1255    Narrative:       CT CHEST, ABDOMEN, AND PELVIS WITH IV CONTRAST     HISTORY: 56-year-old female with metastatic breast cancer. Progressive  shortness of breath. Left mastectomy.     TECHNIQUE: 3 mm images were obtained through the chest, abdomen, and  pelvis after the administration of IV contrast. Compared with CTs  from  07/05/2017.     FINDINGS:  CHEST: The left mainstem bronchus is truncated just proximal to the left  hilum and the left lung is now completely consolidated. There is a small  loculated left pleural effusion which appears largely unchanged. There  is a chest tube within the superior margin of the pleural effusion. The  tiny right pleural effusion has markedly increased in size to a large  effusion. In addition to compressive atelectatic change, there is  partial collapse of the right middle lobe. The right MediPort catheter  tip is within the right atrium. The small pericardial effusion and the  irregular pericardial thickening appears stable.     ABDOMEN/PELVIS: There is significant breathing artifact, but there are  clearly many new liver lesions scattered throughout the liver. Some of  the lesions are subtle. One of the more clearly delineated lesions in  the posterior hepatic segment measures 2 cm. There are lesions scattered  throughout the liver. There has been significant interval increase in  the size and number of nodes at the glenys hepatis and retroperitoneum. A  2.3 x 1.3 cm aortocaval node was previously normal in size. There has  been increase in size of the left adrenal lesion currently measuring 2.0  x 1.4 cm, previously 1.4 x 1.0 cm. There is a large volume of stool  within the right aspect of the colon. No acute bowel abnormality is  seen. Uterus and adnexa appear unremarkable.       Impression:       The left mainstem bronchus is obstructed and the left lung  is now completely consolidated. There has also been development of a  large right pleural effusion and there are many new hepatic metastases.  The left adrenal metastasis has increased in size and there has been  development of lymphadenopathy within the abdomen which is likely  metastatic.     This report was finalized on 8/2/2017 12:51 PM by Dr. Bisi Orellana MD.       XR Chest 1 View [[id number]] Collected:  08/03/17 0545     Updated:   08/03/17 0558    Narrative:       X-RAY CHEST 1 VIEW.     HISTORY: Pleural effusion, status post thoracentesis.     COMPARISON: 08/01/2017.     FINDINGS:  Cardiomediastinal silhouette is within normal limits. Complete  opacification of the left lung, unchanged. Severe pulmonary congestion  on the right.     Lines and tubes are stable.              Impression:       Overall no significant change. No pneumothorax.         This report was finalized on 8/3/2017 5:55 AM by Dr. Hebert Inman MD.             Lab Results:     Lab Results (last 24 hours)     Procedure Component Value Units Date/Time    CBC & Differential [[id number]] Collected:  08/03/17 0535    Specimen:  Blood Updated:  08/03/17 0654    Narrative:       The following orders were created for panel order CBC & Differential.  Procedure                               Abnormality         Status                     ---------                               -----------         ------                     CBC Auto Differential[[id number]]        Abnormal            Final result                 Please view results for these tests on the individual orders.    CBC Auto Differential [[id number]]  (Abnormal) Collected:  08/03/17 0535    Specimen:  Blood Updated:  08/03/17 0654     WBC 9.06 10*3/mm3      RBC 3.65 (L) 10*6/mm3      Hemoglobin 10.3 (L) g/dL      Hematocrit 33.0 (L) %      MCV 90.4 fL      MCH 28.2 pg      MCHC 31.2 (L) g/dL      RDW 19.3 (H) %      RDW-SD 63.6 (H) fl      MPV 9.1 fL      Platelets 593 (H) 10*3/mm3      Neutrophil % 75.7 %      Lymphocyte % 10.7 (L) %      Monocyte % 13.2 (H) %      Eosinophil % 0.1 (L) %      Basophil % 0.1 %      Immature Grans % 0.2 %      Neutrophils, Absolute 6.85 10*3/mm3      Lymphocytes, Absolute 0.97 10*3/mm3      Monocytes, Absolute 1.20 10*3/mm3      Eosinophils, Absolute 0.01 10*3/mm3      Basophils, Absolute 0.01 10*3/mm3      Immature Grans, Absolute 0.02 10*3/mm3     Comprehensive Metabolic Panel [[id number]]   (Abnormal) Collected:  08/03/17 0535    Specimen:  Blood Updated:  08/03/17 0709     Glucose 100 (H) mg/dL      BUN 26 (H) mg/dL      Creatinine 0.58 mg/dL      Sodium 136 mmol/L      Potassium 4.8 mmol/L      Chloride 92 (L) mmol/L      CO2 26.6 mmol/L      Calcium 9.1 mg/dL      Total Protein 5.5 (L) g/dL      Albumin 2.70 (L) g/dL      ALT (SGPT) 29 U/L      AST (SGOT) 59 (H) U/L      Alkaline Phosphatase 152 (H) U/L      Total Bilirubin 0.6 mg/dL      eGFR Non African Amer 107 mL/min/1.73      Globulin 2.8 gm/dL      A/G Ratio 1.0 g/dL      BUN/Creatinine Ratio 44.8 (H)     Anion Gap 17.4 mmol/L            Assessment/Plan     Active Problems:    * No active hospital problems. *       Assessment:    Condition: In serious condition.  Worsening.   (Metastatic breast cancer  Malignant pleural effusions  ).     Plan:   (Currently, patient is a full code.  My understanding is that she is waiting for her son who lives out of state to arrive today.  Her condition seems to be declining due to her metastatic disease.  We would not recommend a Pleurx catheter at this time.  Continue current treatment plan at this time.  Nothing to add per thoracic surgery standpoint at this time.  Will sign off for now and feel free to contact us for any problems or concerns.  ).       Yokasta Luo, APRN  [facility]  08/03/17  10:54 AM

## 2017-08-03 NOTE — PLAN OF CARE
Problem: Patient Care Overview (Adult)  Goal: Plan of Care Review  Outcome: Ongoing (interventions implemented as appropriate)    08/02/17 2012   Coping/Psychosocial Response Interventions   Plan Of Care Reviewed With patient;spouse;friend   Outcome Evaluation   Outcome Summary/Follow up Plan Pt remains full code at this time, awaiting to see son who is coming from [place] ([employer], notified to get son here). PRN morphine given multiple times throughout shift, thoracentesis done 1100 removed from R side. Pt more mobile, up in chair having conversations with family. VSS, remains tachy, will continue to monitor.          Problem: Pressure Ulcer Risk (Josse Scale) (Adult,Obstetrics,Pediatric)  Goal: Skin Integrity  Outcome: Ongoing (interventions implemented as appropriate)    Problem: Fall Risk (Adult)  Goal: Absence of Falls  Outcome: Ongoing (interventions implemented as appropriate)    Problem: Nutrition, Imbalanced: Inadequate Oral Intake (Adult)  Goal: Improved Oral Intake  Outcome: Ongoing (interventions implemented as appropriate)  Goal: Prevent Further Weight Loss  Outcome: Ongoing (interventions implemented as appropriate)

## 2017-08-03 NOTE — PROGRESS NOTES
Discharge Planning Assessment  [facility]     Patient Name: Yury Mott  MRN: [id number]  Today's Date: 8/3/2017    Admit Date: 8/1/2017          Discharge Needs Assessment       08/03/17 0924    Living Environment    Lives With spouse    Living Arrangements house    Home Accessibility bed and bath on same level;grab bars present (bathtub);grab bars present (toilet)    Stair Railings at Home none    Type of Financial/Environmental Concern none    Transportation Available car;family or friend will provide    Living Environment    Provides Primary Care For no one, unable/limited ability to care for self    Primary Care Provided By spouse/significant other;homecare agency (specify)   [facility]    Quality Of Family Relationships supportive;helpful;involved    Able to Return to Prior Living Arrangements other (see comments)   Unsure? Depends on status.    Discharge Needs Assessment    Concerns To Be Addressed discharge planning concerns;home safety concerns;grief and loss concerns    Community Agency Name(S) Has used [facility] (current patient?)    Equipment Currently Used at Home shower chair;oxygen;grab bar;walker, standard;wheelchair;other (see comments);commode   Home O2 per [place]. Pleurax cath supplies.    Equipment Needed After Discharge hospital bed    Discharge Facility/Level Of Care Needs other (see comments)   Unsure at this time?    Discharge Disposition hospice/home;home or self-care;still a patient    Discharge Contact Information if Applicable Evaristo Mott/spouse  [phone]    Discharge Planning Comments Spoke with spouse at bedside. Confirmed information on facesheet. IMM given 8/1/17.            Discharge Plan       08/03/17 0927    Case Management/Social Work Plan    Plan Plans dc home with assist of spouse at this time.    Patient/Family In Agreement With Plan yes    Additional Comments Spoke with patient's spouse at bedside regarding dc plans. Says the plan is to return home. Has used [facility] in  "the past? [facility] is checking to see if current with [facility]. Kerri says she is current and will follow.Briefly discussed possible dc home with [facility]. Says patient worked for [employer] approximately 20 years. He states she has verbalized in the past that in her \"last days\" she would want to be home with [facility]. Currently she is a full-code. It is reported she wants to be a full-code until son is able to arrive here.  Continue to follow.        Discharge Placement     Facility/Agency Request Status Selected? Address Phone Number Fax Number    [facility] Accepted    Yes [address] [place] [address] [phone]                Demographic Summary       08/03/17 0915    Referral Information    Admission Type inpatient    Referral Source admission list    Reason For Consult discharge planning            Functional Status       08/03/17 0915    Functional Status Current    Ambulation 3-->assistive equipment and person    Transferring 3-->assistive equipment and person    Toileting 3-->assistive equipment and person    Bathing 2-->assistive person    Dressing 2-->assistive person    Eating 2-->assistive person    Communication 0-->understands/communicates without difficulty    Swallowing (if score 2 or more for any item, consult Rehab Services) 0-->swallows foods/liquids without difficulty    Change in Functional Status Since Onset of Current Illness/Injury yes    Functional Status Prior    Ambulation 1-->assistive equipment    Transferring 1-->assistive equipment    Toileting 1-->assistive equipment    Bathing 2-->assistive person    Dressing 2-->assistive person    Eating 0-->independent    Communication 0-->understands/communicates without difficulty    Swallowing 0-->swallows foods/liquids without difficulty    IADL    Medications assistive person    Meal Preparation assistive person    Housekeeping assistive person    Laundry assistive person    " Shopping assistive person    Oral Care assistive person    Activity Tolerance    Current Activity Limitations none    Usual Activity Tolerance fair    Current Activity Tolerance poor            Psychosocial     None            Abuse/Neglect     None            Legal     None            Substance Abuse     None            Patient Forms     None          Jazz Zeng RN

## 2017-08-03 NOTE — PROGRESS NOTES
Subjective     REASON FOR FOLLOWUP/CHIEF COMPLAINT: Shortness of air     HISTORY OF PRESENT ILLNESS:   Continue decline over the last 24 hours.  Almost all the time sleeping.  At 2-3 bites of potpie yesterday.  Had just a few sips of fluid all day.  Pain is controlled with narcotics.  Patient unable to answer questions.   provides history.  She has not appeared short of air.    Past Medical History, Past Surgical History, Social History, Family History have been reviewed and are without significant changes except as mentioned.    Review of Systems   GENERAL: No change in appetite or weight;   No fevers, chills, sweats.    SKIN: No nonhealing lesions.   No rashes.  HEME/LYMPH: No easy bruising, bleeding.   No swollen nodes.   EYES: No vision changes or diplopia.   ENT: No tinnitus, hearing loss, gum bleeding, epistaxis, hoarseness or dysphagia.   RESPIRATORY: see HPI   CVS: No chest pain, palpitations, orthopnea, dyspnea on exertion or PND.   GI: No melena or hematochezia.   No abdominal pain.  No nausea, vomiting, constipation, diarrhea  : No lower tract obstructive symptoms, dysuria or hematuria.   MUSCULOSKELETAL: No bone pain.  No joint stiffness.   NEUROLOGICAL: No global weakness, loss of consciousness or seizures.   PSYCHIATRIC: No increased nervousness, mood changes or depression.     Medications:  The current medication list was reviewed in the EMR    ALLERGIES:    Allergies   Allergen Reactions   • Latex Rash       Objective      Vitals:    08/02/17 1657 08/02/17 2041 08/03/17 0439 08/03/17 0810   BP: 91/61 103/67 102/68 101/69   BP Location: Right arm Right arm Right arm Right arm   Patient Position: Lying Lying Lying Sitting   Pulse: (!) 126 (!) 135  Comment: will notify pt nurse (!) 127 (!) 128   Resp: 16 16 16 18   Temp: 97 °F (36.1 °C) 97.4 °F (36.3 °C) 97.9 °F (36.6 °C)    TempSrc: Oral Oral Axillary    SpO2: 98% 98% 98% 98%   Weight:       Height:         Physical Exam    GENERAL:  Looks  frail and weak.  SKIN:  Warm, dry without rashes, purpura or petechiae.  EYES:  Pupils equal, round and reactive to light.  EOMs intact.  Conjunctivae normal.  EARS:  Hearing intact.  NOSE:  Septum midline.  No excoriations or nasal discharge.  MOUTH:  Tongue is well-papillated; no stomatitis or ulcers.  Lips normal.  THROAT:  Oropharynx without lesions or exudates.  NECK:  Supple with good range of motion; no thyromegaly or masses, no JVD.  CHEST:  Lungs clear to auscultation.  Decreased airflow left side   CARDIAC:  Regular rate and rhythm without murmurs, rubs or gallops. Normal S1,S2.  ABDOMEN:  Soft, nontender with no hepatosplenomegaly or masses.  EXTREMITIES:  No clubbing, cyanosis or edema.  NEUROLOGICAL: Cannot assess as patient not appropriately verbal.  PSYCHIATRIC:  Cannot assess as patient not appropriately verbal.  RECENT LABS:  WBC   Date Value Ref Range Status   08/03/2017 9.06 4.50 - 10.70 10*3/mm3 Final   08/02/2017 8.35 4.50 - 10.70 10*3/mm3 Final   08/01/2017 9.85 4.00 - 10.00 10*3/mm3 Final     Hemoglobin   Date Value Ref Range Status   08/03/2017 10.3 (L) 11.9 - 15.5 g/dL Final   08/02/2017 9.9 (L) 11.9 - 15.5 g/dL Final   08/01/2017 10.2 (L) 11.5 - 14.9 g/dL Final     Platelets   Date Value Ref Range Status   08/03/2017 593 (H) 140 - 500 10*3/mm3 Final   08/02/2017 553 (H) 140 - 500 10*3/mm3 Final   08/01/2017 596 (H) 150 - 375 10*3/mm3 Final           Assessment/Plan     ASSESSMENT/PLAN:  *Metastatic breast cancer.  ER/NC negative.  HER-2 positive.  Metastatic to brain, liver, chest wall.  Progressed on Tykerb and Xeloda. Intolerant to Navelbine and Herceptin switched to Abraxane and Herceptin on 4/17/17. Mixed response with new disease at T11 and left adrenal and no response in the skull metastasis. New therapy with Gemzar/Herceptin initiated 7/11/17.    *Bilateral malignant pleural effusions.  Pleurx on left side.  Large right pleural effusion.  1100 mL removed by thoracentesis  8/2/17.    *Hypoxia.  Likely due to pleural effusion and left mainstem bronchial obstruction/complete consolidation.  Dr. Arriaga was evaluated and does not feel her bronchial stents would be helpful.  Extensive consolidation and lymphangitic spread due to progression of malignancy.  No plans for a Pleurx as she has a limited life expectancy.    *Pain due to malignancy.  Controlled with narcotics.      *FULL CODE.  Patient wants this because she wants her son who lives out of state to be able to compensate goodbye or she is on the ventilator, after that, she wants her family to turn off the ventilator and let her past naturally.  I once again asked patient and  to discuss this with the son as I would recommend a DO NOT RESUSCITATE/allow natural death status.   I explained to the  I would estimate life expectancy measured in days.    Plan  ·  is considering taking her home with the help of hospice.  He would like to try Roxanol while she is an inpatient to see if this will work at home.  · I once again encouraged the family to think more about a DNR status.

## 2017-08-04 NOTE — PLAN OF CARE
Problem: Patient Care Overview (Adult)  Goal: Plan of Care Review  Outcome: Ongoing (interventions implemented as appropriate)    08/03/17 2040   Coping/Psychosocial Response Interventions   Plan Of Care Reviewed With patient;spouse;family   Outcome Evaluation   Outcome Summary/Follow up Plan Pt has continued to decline today and is no longer resisting pain/anxiety medications. Dilaudid given about every 3 hours today and Ativan about every 4-6. Family arrived and is at bedside. Pt tachycardic and tachypnic throughout the day. Using accessory muscles to breath. Difficulty swallowing at times. Reviewed symptoms of the active dying process with family, they V/U that this is what is happening.  wants pt to remain a full code for now. Pt's sister to arrive tonight from out of town. Remains on 3L O2. Very little urine output, very little PO intake.    Patient Care Overview   Progress declining       Goal: Adult Individualization and Mutuality  Outcome: Ongoing (interventions implemented as appropriate)    Problem: Pressure Ulcer Risk (Josse Scale) (Adult,Obstetrics,Pediatric)  Goal: Skin Integrity  Outcome: Ongoing (interventions implemented as appropriate)    Problem: Fall Risk (Adult)  Goal: Absence of Falls  Outcome: Ongoing (interventions implemented as appropriate)    Problem: Nutrition, Imbalanced: Inadequate Oral Intake (Adult)  Goal: Improved Oral Intake  Outcome: Ongoing (interventions implemented as appropriate)  Goal: Prevent Further Weight Loss  Outcome: Ongoing (interventions implemented as appropriate)    Problem: Oncology Care (Adult)  Goal: Signs and Symptoms of Listed Potential Problems Will be Absent or Manageable (Oncology Care)  Outcome: Ongoing (interventions implemented as appropriate)

## 2017-08-04 NOTE — PROGRESS NOTES
Continued Stay Note  [facility]     Patient Name: Yury Mott  MRN: [id number]  Today's Date: 8/4/2017    Admit Date: 8/1/2017          Discharge Plan       08/04/17 1155    Case Management/Social Work Plan    Plan Home with [facility]    Patient/Family In Agreement With Plan yes    Additional Comments Orders received to arrange for [facility] to follow at home. Call placed to Kemi/Denise to inform. Will try to see today to arrange for dc home. Continue to follow.              Discharge Codes     None            Jazz Zeng RN

## 2017-08-04 NOTE — NURSING NOTE
Spoke to spouse, Evaristo Mott, concerning patient's quality of care.  Spouse wants patient to be comfort care and DNR.  Confirmed with spouse, Evaristo Mott, request to change code status from full code to DNR Comfort Measures with Alex Bullard RN as witness.  Spoke to Dr. Foreman and received OK to place order.  Palliative care orders will be address in AM.

## 2017-08-04 NOTE — DISCHARGE SUMMARY
Admission date: 8/1/2017  Discharge date: 08/04/17    Admission diagnosis:  Malignant pleural effusion [J91.0]    Discharge diagnosis:  Metastatic breast cancer  Bilateral malignant pleural effusions    Consultants:   Dr. Asael Arriaga    [facility] course:  *Metastatic breast cancer.  ER/UT negative.  HER-2 positive.  Metastatic to brain, liver, chest wall.  Progressed on Tykerb and Xeloda. Intolerant to Navelbine and Herceptin switched to Abraxane and Herceptin on 4/17/17. Mixed response with new disease at T11 and left adrenal and no response in the skull metastasis. New therapy with Gemzar/Herceptin initiated 7/11/17.     *Bilateral malignant pleural effusions.  Pleurx on left side.  Large right pleural effusion.  1100 mL removed by thoracentesis 8/2/17.     *Hypoxia.  Likely due to pleural effusion and left mainstem bronchial obstruction/complete consolidation.  Dr. Arriaga was evaluated and does not feel her bronchial stents would be helpful.  Extensive consolidation and lymphangitic spread due to progression of malignancy.  No plans for a Pleurx as she has a limited life expectancy.     *Pain due to malignancy.  Controlled with narcotics.      *GOALS OF CARE-she has decided on a comfort care/terminal care approach.  She is decided on a DO NOT RESUSCITATE/allow natural death status.  She wants to help of hospice and wants to be at home with her family for her remaining days.      Vitals:    08/04/17 1241   BP: 90/55   Pulse: (!) 128   Resp: 16   Temp: 96.9 °F (36.1 °C)   SpO2: 98%     GENERAL:  Well-developed, well-nourished in no acute distress.   SKIN:  Warm, dry without rashes, purpura or petechiae.  NECK:  Supple with good range of motion; no thyromegaly or masses, no JVD.  LYMPHATICS:  No cervical, supraclavicular, axillary or inguinal adenopathy.  CHEST:  Lungs clear to percussion and auscultation. Good airflow.  CARDIAC:  Regular rate and rhythm without murmurs, rubs or gallops.   EXTREMITIES:  No  clubbing, cyanosis or edema.  PSYCHIATRIC:  Normal affect and mood.      Discharge condition: stable  Discharge diet        Dietary Orders            Start     Ordered    08/02/17 1200  Dietary Nutrition Supplements Ensure Enlive  Daily With Breakfast, Lunch & Dinner     Comments:  Vanilla   Question:  Select Supplement:  Answer:  Ensure Enlive    08/02/17 1032    08/01/17 1111  Diet Regular  Diet Effective Now     Question:  Diet Texture / Consistency  Answer:  Regular    08/01/17 1110        Additional Instructions: Call with fevers, uncontrolled nausea/vomiting/pain.  Medications:    Yury Mott   Home Medication Instructions DEACON:[id number]    Printed on:08/04/17 0370   Medication Information                      bisacodyl (DULCOLAX) 5 MG EC tablet  Take 10 mg by mouth Daily As Needed for Constipation (PT STS TAKES 2-3 PPRN).             fentaNYL (DURAGESIC) 25 MCG/HR patch  Place 1 patch on the skin Every 72 (Seventy-Two) Hours.             Glycerin-Hypromellose- (ARTIFICIAL TEARS) 0.2-0.2-1 % solution ophthalmic solution  Administer 1 drop to both eyes Every 1 (One) Hour As Needed for Dry Eyes.             lactulose (CHRONULAC) 10 GM/15ML solution  Take 30 mL by mouth Daily.             LORazepam (ATIVAN) 0.5 MG tablet  Take 1 tablet by mouth Every 4 (Four) Hours As Needed for Anxiety.             morphine 20 MG/ML concentrated solution  Take 0.5 mL by mouth Every 12 (Twelve) Hours As Needed for Moderate Pain (4-6) or Severe Pain  (7-10).             ondansetron (ZOFRAN) 8 MG tablet  Take 1 tablet by mouth Every 8 (Eight) Hours As Needed for nausea or vomiting.             predniSONE (DELTASONE) 10 MG tablet  TAKE 1 TABLET BY MOUTH DAILY.             Scopolamine (TRANSDERM-SCOP, 1.5 MG,) 1.5 MG/3DAYS patch  Place 1 patch on the skin Every 72 (Seventy-Two) Hours.             sennosides-docusate sodium (SENOKOT-S) 8.6-50 MG tablet  Take 2 tablets by mouth Daily.               Disposition:Home or Self  Care  Follow up: Future Appointments  Date Time Provider Department Center   8/8/2017 8:30 AM [facility]  [facility]   8/8/2017 9:00 AM CHAIR 03 [facility]  [facility]   8/22/2017 8:30 AM [facility] PORT CHA[facility]  [facility]   8/22/2017 9:00 AM Ted Manley MD [facility]  [facility]   8/22/2017 9:30 AM CHAIR 02 [facility]  [facility]   8/29/2017 8:30 AM [facility] PORT CHAIR  [facility]   8/29/2017 9:00 AM CHAIR 01 [facility]  [facility]       Over 30 minutes on discharge.  Counseling and coordinating care.

## 2017-08-04 NOTE — PROGRESS NOTES
Continued Stay Note  [facility]     Patient Name: Yury Mott  MRN: [id number]  Today's Date: 8/4/2017    Admit Date: 8/1/2017          Discharge Plan       08/04/17 1519    Case Management/Social Work Plan    Additional Comments Nikky/Denise on unit to arrange dc home (medications & equipment). No additional needs noted.    Final Note    Final Note Orders received to dc. Family to transport per private auto.              Discharge Codes       08/04/17 1448    Discharge Codes    Discharge Codes 50  Hospice - home        Expected Discharge Date and Time     Expected Discharge Date Expected Discharge Time    Aug 4, 2017             Jazz Zeng RN

## 2017-08-04 NOTE — PLAN OF CARE
Problem: Patient Care Overview (Adult)  Goal: Plan of Care Review  Outcome: Ongoing (interventions implemented as appropriate)    08/04/17 0573   Coping/Psychosocial Response Interventions   Plan Of Care Reviewed With patient;spouse;sibling   Outcome Evaluation   Outcome Summary/Follow up Plan Easily lethargic after dilaudid. No ativan given. urine incontinence x 2. Spoke to sister & spouse concerning comfort & possibllty of a code. Spouse Ok'd to initiate DNR Comfort measures. Alex Bullard RN witness & order ok'd by Dr. Foreman. Palliative care orders will be address in AM.    Patient Care Overview   Progress declining

## 2017-08-04 NOTE — PROGRESS NOTES
Home with [facility] facilitated & admission will be completed once home tonight 8/4. Meds for  by family facilitated.    Thank you for referral.   Nikky Walton, RN, BSN, CHPN

## 2017-08-22 ENCOUNTER — APPOINTMENT (OUTPATIENT)
Dept: ONCOLOGY | Facility: CLINIC | Age: 57
End: 2017-08-22

## 2017-08-22 ENCOUNTER — APPOINTMENT (OUTPATIENT)
Dept: ONCOLOGY | Facility: HOSPITAL | Age: 57
End: 2017-08-22

## (undated) DEVICE — 3M™ STERI-STRIP™ COMPOUND BENZOIN TINCTURE 40 BAGS/CARTON 4 CARTONS/CASE C1544: Brand: 3M™ STERI-STRIP™

## (undated) DEVICE — APPL CHLORAPREP W/TINT 26ML ORNG

## (undated) DEVICE — 3M™ STERI-STRIP™ REINFORCED ADHESIVE SKIN CLOSURES, R1547, 1/2 IN X 4 IN (12 MM X 100 MM), 6 STRIPS/ENVELOPE: Brand: 3M™ STERI-STRIP™

## (undated) DEVICE — KT DRN PLEURL PLEURX NO/CATH 1BT/500M

## (undated) DEVICE — PK CHST BRST 40

## (undated) DEVICE — ENCORE® LATEX ORTHO SIZE 7.5, STERILE LATEX POWDER-FREE SURGICAL GLOVE: Brand: ENCORE

## (undated) DEVICE — DRP C/ARM 41X74IN

## (undated) DEVICE — KT CATH DRN PLEURL PLEURX/SAFE/T SIL 15.5F 66CM 4BT/1000ML

## (undated) DEVICE — ANTIBACTERIAL UNDYED BRAIDED (POLYGLACTIN 910), SYNTHETIC ABSORBABLE SUTURE: Brand: COATED VICRYL